# Patient Record
Sex: FEMALE | Race: WHITE | Employment: OTHER | URBAN - NONMETROPOLITAN AREA
[De-identification: names, ages, dates, MRNs, and addresses within clinical notes are randomized per-mention and may not be internally consistent; named-entity substitution may affect disease eponyms.]

---

## 2017-01-03 ENCOUNTER — OFFICE VISIT (OUTPATIENT)
Dept: FAMILY MEDICINE CLINIC | Age: 67
End: 2017-01-03

## 2017-01-03 VITALS
BODY MASS INDEX: 27.16 KG/M2 | WEIGHT: 183.4 LBS | HEIGHT: 69 IN | DIASTOLIC BLOOD PRESSURE: 70 MMHG | HEART RATE: 80 BPM | TEMPERATURE: 98.1 F | SYSTOLIC BLOOD PRESSURE: 132 MMHG | OXYGEN SATURATION: 98 %

## 2017-01-03 DIAGNOSIS — K21.9 GASTROESOPHAGEAL REFLUX DISEASE, ESOPHAGITIS PRESENCE NOT SPECIFIED: ICD-10-CM

## 2017-01-03 DIAGNOSIS — M65.30 TRIGGER FINGER, UNSPECIFIED FINGER, UNSPECIFIED LATERALITY: ICD-10-CM

## 2017-01-03 DIAGNOSIS — L30.9 ECZEMA, UNSPECIFIED TYPE: ICD-10-CM

## 2017-01-03 DIAGNOSIS — Z23 NEED FOR PNEUMOCOCCAL VACCINATION: ICD-10-CM

## 2017-01-03 DIAGNOSIS — E03.9 HYPOTHYROIDISM, UNSPECIFIED TYPE: ICD-10-CM

## 2017-01-03 DIAGNOSIS — Z00.00 WELCOME TO MEDICARE PREVENTIVE VISIT: Primary | ICD-10-CM

## 2017-01-03 DIAGNOSIS — E78.5 HYPERLIPIDEMIA, UNSPECIFIED HYPERLIPIDEMIA TYPE: ICD-10-CM

## 2017-01-03 PROCEDURE — G0402 INITIAL PREVENTIVE EXAM: HCPCS | Performed by: NURSE PRACTITIONER

## 2017-01-03 PROCEDURE — G0009 ADMIN PNEUMOCOCCAL VACCINE: HCPCS | Performed by: NURSE PRACTITIONER

## 2017-01-03 PROCEDURE — 90670 PCV13 VACCINE IM: CPT | Performed by: NURSE PRACTITIONER

## 2017-01-03 RX ORDER — MONTELUKAST SODIUM 10 MG/1
10 TABLET ORAL NIGHTLY
Qty: 90 TABLET | Refills: 1 | Status: SHIPPED | OUTPATIENT
Start: 2017-01-03 | End: 2017-01-11 | Stop reason: SDUPTHER

## 2017-01-03 RX ORDER — SIMVASTATIN 20 MG
20 TABLET ORAL NIGHTLY
Qty: 90 TABLET | Refills: 1 | Status: SHIPPED | OUTPATIENT
Start: 2017-01-03 | End: 2017-01-11 | Stop reason: SDUPTHER

## 2017-01-03 RX ORDER — DEXLANSOPRAZOLE 60 MG/1
60 CAPSULE, DELAYED RELEASE ORAL DAILY
Qty: 90 CAPSULE | Refills: 1 | Status: SHIPPED | OUTPATIENT
Start: 2017-01-03 | End: 2017-01-11 | Stop reason: SDUPTHER

## 2017-01-03 RX ORDER — AZELASTINE HCL 205.5 UG/1
1 SPRAY NASAL
COMMUNITY
Start: 2016-11-17 | End: 2017-07-18

## 2017-01-03 RX ORDER — CLOBETASOL PROPIONATE 0.5 MG/G
CREAM TOPICAL
Qty: 3 TUBE | Refills: 1 | Status: CANCELLED | OUTPATIENT
Start: 2017-01-03

## 2017-01-03 RX ORDER — LEVOTHYROXINE SODIUM 0.03 MG/1
25 TABLET ORAL DAILY
Qty: 90 TABLET | Refills: 1 | Status: SHIPPED | OUTPATIENT
Start: 2017-01-03 | End: 2017-01-11 | Stop reason: SDUPTHER

## 2017-01-03 RX ORDER — MAGNESIUM OXIDE 400 MG/1
400 TABLET ORAL DAILY
Qty: 30 TABLET | Refills: 6 | Status: CANCELLED | OUTPATIENT
Start: 2017-01-03

## 2017-01-03 ASSESSMENT — PATIENT HEALTH QUESTIONNAIRE - PHQ9: SUM OF ALL RESPONSES TO PHQ QUESTIONS 1-9: 0

## 2017-01-09 DIAGNOSIS — K21.9 GASTROESOPHAGEAL REFLUX DISEASE, ESOPHAGITIS PRESENCE NOT SPECIFIED: ICD-10-CM

## 2017-01-09 DIAGNOSIS — E78.5 HYPERLIPIDEMIA, UNSPECIFIED HYPERLIPIDEMIA TYPE: ICD-10-CM

## 2017-01-09 DIAGNOSIS — E03.9 HYPOTHYROIDISM, UNSPECIFIED TYPE: ICD-10-CM

## 2017-01-09 LAB
ALBUMIN SERPL-MCNC: 4.3 G/DL (ref 3.9–4.9)
ALP BLD-CCNC: 60 U/L (ref 40–130)
ALT SERPL-CCNC: 21 U/L (ref 0–33)
ANION GAP SERPL CALCULATED.3IONS-SCNC: 12 MEQ/L (ref 7–13)
AST SERPL-CCNC: 18 U/L (ref 0–35)
BASOPHILS ABSOLUTE: 0.1 K/UL (ref 0–0.2)
BASOPHILS RELATIVE PERCENT: 1.2 %
BILIRUB SERPL-MCNC: 0.4 MG/DL (ref 0–1.2)
BUN BLDV-MCNC: 17 MG/DL (ref 8–23)
CALCIUM SERPL-MCNC: 9.2 MG/DL (ref 8.6–10.2)
CHLORIDE BLD-SCNC: 100 MEQ/L (ref 98–107)
CHOLESTEROL, TOTAL: 184 MG/DL (ref 0–199)
CO2: 26 MEQ/L (ref 22–29)
CREAT SERPL-MCNC: 0.98 MG/DL (ref 0.5–0.9)
EOSINOPHILS ABSOLUTE: 0.4 K/UL (ref 0–0.7)
EOSINOPHILS RELATIVE PERCENT: 8.6 %
GFR AFRICAN AMERICAN: >60
GFR NON-AFRICAN AMERICAN: 56.9
GLOBULIN: 2.5 G/DL (ref 2.3–3.5)
GLUCOSE BLD-MCNC: 98 MG/DL (ref 74–109)
HCT VFR BLD CALC: 37.9 % (ref 37–47)
HDLC SERPL-MCNC: 76 MG/DL (ref 40–59)
HEMOGLOBIN: 12.9 G/DL (ref 12–16)
LDL CHOLESTEROL CALCULATED: 89 MG/DL (ref 0–129)
LYMPHOCYTES ABSOLUTE: 1.4 K/UL (ref 1–4.8)
LYMPHOCYTES RELATIVE PERCENT: 29.9 %
MCH RBC QN AUTO: 31.5 PG (ref 27–31.3)
MCHC RBC AUTO-ENTMCNC: 34.1 % (ref 33–37)
MCV RBC AUTO: 92.5 FL (ref 82–100)
MONOCYTES ABSOLUTE: 0.5 K/UL (ref 0.2–0.8)
MONOCYTES RELATIVE PERCENT: 10.4 %
NEUTROPHILS ABSOLUTE: 2.4 K/UL (ref 1.4–6.5)
NEUTROPHILS RELATIVE PERCENT: 49.9 %
PDW BLD-RTO: 13.1 % (ref 11.5–14.5)
PLATELET # BLD: 270 K/UL (ref 130–400)
POTASSIUM SERPL-SCNC: 4.4 MEQ/L (ref 3.5–5.1)
RBC # BLD: 4.1 M/UL (ref 4.2–5.4)
SODIUM BLD-SCNC: 138 MEQ/L (ref 132–144)
T4 FREE: 1.04 NG/DL (ref 0.93–1.7)
TOTAL PROTEIN: 6.8 G/DL (ref 6.4–8.1)
TRIGL SERPL-MCNC: 97 MG/DL (ref 0–200)
TSH SERPL DL<=0.05 MIU/L-ACNC: 4.45 UIU/ML (ref 0.27–4.2)
WBC # BLD: 4.8 K/UL (ref 4.8–10.8)

## 2017-01-11 DIAGNOSIS — K21.9 GASTROESOPHAGEAL REFLUX DISEASE, ESOPHAGITIS PRESENCE NOT SPECIFIED: ICD-10-CM

## 2017-01-11 DIAGNOSIS — E03.9 HYPOTHYROIDISM, UNSPECIFIED TYPE: ICD-10-CM

## 2017-01-11 DIAGNOSIS — E78.5 HYPERLIPIDEMIA, UNSPECIFIED HYPERLIPIDEMIA TYPE: ICD-10-CM

## 2017-01-11 RX ORDER — LEVOTHYROXINE SODIUM 0.03 MG/1
25 TABLET ORAL DAILY
Qty: 90 TABLET | Refills: 1 | Status: SHIPPED | OUTPATIENT
Start: 2017-01-11 | End: 2017-01-12 | Stop reason: CLARIF

## 2017-01-11 RX ORDER — DEXLANSOPRAZOLE 60 MG/1
60 CAPSULE, DELAYED RELEASE ORAL DAILY
Qty: 90 CAPSULE | Refills: 1 | Status: SHIPPED | OUTPATIENT
Start: 2017-01-11 | End: 2017-08-24 | Stop reason: SDUPTHER

## 2017-01-11 RX ORDER — MONTELUKAST SODIUM 10 MG/1
10 TABLET ORAL NIGHTLY
Qty: 90 TABLET | Refills: 1 | Status: SHIPPED | OUTPATIENT
Start: 2017-01-11 | End: 2017-08-24 | Stop reason: SDUPTHER

## 2017-01-11 RX ORDER — SIMVASTATIN 20 MG
20 TABLET ORAL NIGHTLY
Qty: 90 TABLET | Refills: 1 | Status: SHIPPED | OUTPATIENT
Start: 2017-01-11 | End: 2017-01-30

## 2017-01-17 ENCOUNTER — TELEPHONE (OUTPATIENT)
Dept: FAMILY MEDICINE CLINIC | Age: 67
End: 2017-01-17

## 2017-01-17 RX ORDER — LEVOTHYROXINE SODIUM 0.05 MG/1
50 TABLET ORAL DAILY
Qty: 90 TABLET | Refills: 1 | Status: SHIPPED | OUTPATIENT
Start: 2017-01-17 | End: 2017-06-05 | Stop reason: SDUPTHER

## 2017-01-30 RX ORDER — SIMVASTATIN 20 MG
20 TABLET ORAL EVERY EVENING
Qty: 90 TABLET | Refills: 0 | Status: SHIPPED | OUTPATIENT
Start: 2017-01-30 | End: 2018-11-28 | Stop reason: SDUPTHER

## 2017-04-25 ENCOUNTER — TELEPHONE (OUTPATIENT)
Dept: FAMILY MEDICINE CLINIC | Age: 67
End: 2017-04-25

## 2017-06-05 RX ORDER — LEVOTHYROXINE SODIUM 0.05 MG/1
50 TABLET ORAL DAILY
Qty: 90 TABLET | Refills: 1 | Status: SHIPPED | OUTPATIENT
Start: 2017-06-05 | End: 2018-01-03 | Stop reason: SDUPTHER

## 2017-06-05 RX ORDER — MOMETASONE FUROATE 50 UG/1
2 SPRAY, METERED NASAL 2 TIMES DAILY
Qty: 3 INHALER | Refills: 1 | Status: SHIPPED | OUTPATIENT
Start: 2017-06-05 | End: 2018-01-29 | Stop reason: CLARIF

## 2017-07-18 ENCOUNTER — OFFICE VISIT (OUTPATIENT)
Dept: FAMILY MEDICINE CLINIC | Age: 67
End: 2017-07-18

## 2017-07-18 VITALS
DIASTOLIC BLOOD PRESSURE: 70 MMHG | SYSTOLIC BLOOD PRESSURE: 132 MMHG | TEMPERATURE: 97.4 F | HEIGHT: 69 IN | HEART RATE: 75 BPM | BODY MASS INDEX: 27.18 KG/M2 | WEIGHT: 183.5 LBS | OXYGEN SATURATION: 98 %

## 2017-07-18 DIAGNOSIS — M25.551 RIGHT HIP PAIN: ICD-10-CM

## 2017-07-18 DIAGNOSIS — M54.41 ACUTE MIDLINE LOW BACK PAIN WITH RIGHT-SIDED SCIATICA: Primary | ICD-10-CM

## 2017-07-18 DIAGNOSIS — E78.5 HYPERLIPIDEMIA, UNSPECIFIED HYPERLIPIDEMIA TYPE: ICD-10-CM

## 2017-07-18 DIAGNOSIS — R73.03 PREDIABETES: ICD-10-CM

## 2017-07-18 DIAGNOSIS — E06.3 HASHIMOTO'S DISEASE: ICD-10-CM

## 2017-07-18 PROCEDURE — 3014F SCREEN MAMMO DOC REV: CPT | Performed by: NURSE PRACTITIONER

## 2017-07-18 PROCEDURE — 1036F TOBACCO NON-USER: CPT | Performed by: NURSE PRACTITIONER

## 2017-07-18 PROCEDURE — 4040F PNEUMOC VAC/ADMIN/RCVD: CPT | Performed by: NURSE PRACTITIONER

## 2017-07-18 PROCEDURE — G8427 DOCREV CUR MEDS BY ELIG CLIN: HCPCS | Performed by: NURSE PRACTITIONER

## 2017-07-18 PROCEDURE — 99214 OFFICE O/P EST MOD 30 MIN: CPT | Performed by: NURSE PRACTITIONER

## 2017-07-18 PROCEDURE — 1123F ACP DISCUSS/DSCN MKR DOCD: CPT | Performed by: NURSE PRACTITIONER

## 2017-07-18 PROCEDURE — 1090F PRES/ABSN URINE INCON ASSESS: CPT | Performed by: NURSE PRACTITIONER

## 2017-07-18 PROCEDURE — G8400 PT W/DXA NO RESULTS DOC: HCPCS | Performed by: NURSE PRACTITIONER

## 2017-07-18 PROCEDURE — G8419 CALC BMI OUT NRM PARAM NOF/U: HCPCS | Performed by: NURSE PRACTITIONER

## 2017-07-18 PROCEDURE — 3017F COLORECTAL CA SCREEN DOC REV: CPT | Performed by: NURSE PRACTITIONER

## 2017-07-18 RX ORDER — AZELASTINE HCL 205.5 UG/1
SPRAY NASAL
COMMUNITY
End: 2017-07-18

## 2017-07-18 ASSESSMENT — ENCOUNTER SYMPTOMS
COUGH: 0
BACK PAIN: 1
SHORTNESS OF BREATH: 0

## 2017-07-19 ENCOUNTER — TELEPHONE (OUTPATIENT)
Dept: FAMILY MEDICINE CLINIC | Age: 67
End: 2017-07-19

## 2017-07-21 DIAGNOSIS — E06.3 HASHIMOTO'S DISEASE: ICD-10-CM

## 2017-07-21 DIAGNOSIS — M54.41 ACUTE MIDLINE LOW BACK PAIN WITH RIGHT-SIDED SCIATICA: ICD-10-CM

## 2017-07-21 DIAGNOSIS — E78.5 HYPERLIPIDEMIA, UNSPECIFIED HYPERLIPIDEMIA TYPE: ICD-10-CM

## 2017-07-21 DIAGNOSIS — R73.03 PREDIABETES: ICD-10-CM

## 2017-07-21 LAB
ALBUMIN SERPL-MCNC: 4.1 G/DL (ref 3.9–4.9)
ALP BLD-CCNC: 52 U/L (ref 40–130)
ALT SERPL-CCNC: 18 U/L (ref 0–33)
ANION GAP SERPL CALCULATED.3IONS-SCNC: 10 MEQ/L (ref 7–13)
AST SERPL-CCNC: 19 U/L (ref 0–35)
BILIRUB SERPL-MCNC: 0.4 MG/DL (ref 0–1.2)
BUN BLDV-MCNC: 16 MG/DL (ref 8–23)
CALCIUM SERPL-MCNC: 9.4 MG/DL (ref 8.6–10.2)
CHLORIDE BLD-SCNC: 101 MEQ/L (ref 98–107)
CHOLESTEROL, TOTAL: 170 MG/DL (ref 0–199)
CO2: 27 MEQ/L (ref 22–29)
CREAT SERPL-MCNC: 0.72 MG/DL (ref 0.5–0.9)
GFR AFRICAN AMERICAN: >60
GFR NON-AFRICAN AMERICAN: >60
GLOBULIN: 2.6 G/DL (ref 2.3–3.5)
GLUCOSE BLD-MCNC: 92 MG/DL (ref 74–109)
HBA1C MFR BLD: 5.8 % (ref 4.8–5.9)
HDLC SERPL-MCNC: 75 MG/DL (ref 40–59)
LDL CHOLESTEROL CALCULATED: 77 MG/DL (ref 0–129)
POTASSIUM SERPL-SCNC: 4.6 MEQ/L (ref 3.5–5.1)
SODIUM BLD-SCNC: 138 MEQ/L (ref 132–144)
T4 FREE: 1.22 NG/DL (ref 0.93–1.7)
TOTAL PROTEIN: 6.7 G/DL (ref 6.4–8.1)
TRIGL SERPL-MCNC: 90 MG/DL (ref 0–200)
TSH SERPL DL<=0.05 MIU/L-ACNC: 2.26 UIU/ML (ref 0.27–4.2)

## 2017-07-24 DIAGNOSIS — M51.36 DDD (DEGENERATIVE DISC DISEASE), LUMBAR: Primary | ICD-10-CM

## 2017-08-09 ENCOUNTER — TELEPHONE (OUTPATIENT)
Dept: FAMILY MEDICINE CLINIC | Age: 67
End: 2017-08-09

## 2017-08-09 DIAGNOSIS — Z12.39 BREAST CANCER SCREENING: Primary | ICD-10-CM

## 2017-08-09 DIAGNOSIS — Z12.31 ENCOUNTER FOR SCREENING MAMMOGRAM FOR BREAST CANCER: ICD-10-CM

## 2017-08-10 PROBLEM — M47.26 OSTEOARTHRITIS OF SPINE WITH RADICULOPATHY, LUMBAR REGION: Status: ACTIVE | Noted: 2017-08-10

## 2017-08-11 PROBLEM — M47.817 LUMBOSACRAL SPONDYLOSIS WITHOUT MYELOPATHY: Status: ACTIVE | Noted: 2017-08-11

## 2017-08-24 DIAGNOSIS — K21.9 GASTROESOPHAGEAL REFLUX DISEASE, ESOPHAGITIS PRESENCE NOT SPECIFIED: ICD-10-CM

## 2017-08-24 RX ORDER — MONTELUKAST SODIUM 10 MG/1
TABLET ORAL
Qty: 90 TABLET | Refills: 1 | Status: SHIPPED | OUTPATIENT
Start: 2017-08-24 | End: 2018-01-03 | Stop reason: SDUPTHER

## 2017-08-24 RX ORDER — DEXLANSOPRAZOLE 60 MG/1
CAPSULE, DELAYED RELEASE ORAL
Qty: 90 CAPSULE | Refills: 1 | Status: SHIPPED | OUTPATIENT
Start: 2017-08-24 | End: 2018-01-03 | Stop reason: SDUPTHER

## 2017-11-03 ENCOUNTER — TELEPHONE (OUTPATIENT)
Dept: FAMILY MEDICINE CLINIC | Age: 67
End: 2017-11-03

## 2017-11-03 RX ORDER — CIPROFLOXACIN 500 MG/1
500 TABLET, FILM COATED ORAL 2 TIMES DAILY
Qty: 20 TABLET | Refills: 0 | Status: SHIPPED | OUTPATIENT
Start: 2017-11-03 | End: 2017-11-13

## 2017-11-03 NOTE — TELEPHONE ENCOUNTER
Orders Placed This Encounter   Medications    ciprofloxacin (CIPRO) 500 MG tablet     Sig: Take 1 tablet by mouth 2 times daily for 10 days     Dispense:  20 tablet     Refill:  0       The above med(s) were e-scripted to the patient's pharmacy.    Please advise patient  Bartolo Sanchez MD

## 2017-11-27 ENCOUNTER — HOSPITAL ENCOUNTER (OUTPATIENT)
Dept: WOMENS IMAGING | Age: 67
Discharge: HOME | End: 2017-11-27
Payer: MEDICARE

## 2017-11-27 DIAGNOSIS — Z12.31 ENCOUNTER FOR SCREENING MAMMOGRAM FOR BREAST CANCER: ICD-10-CM

## 2017-11-27 PROCEDURE — 77063 BREAST TOMOSYNTHESIS BI: CPT

## 2018-01-03 DIAGNOSIS — K21.9 GASTROESOPHAGEAL REFLUX DISEASE, ESOPHAGITIS PRESENCE NOT SPECIFIED: ICD-10-CM

## 2018-01-03 DIAGNOSIS — M54.5 LOW BACK PAIN, UNSPECIFIED BACK PAIN LATERALITY, UNSPECIFIED CHRONICITY, WITH SCIATICA PRESENCE UNSPECIFIED: ICD-10-CM

## 2018-01-03 DIAGNOSIS — M19.90 OSTEOARTHRITIS, UNSPECIFIED OSTEOARTHRITIS TYPE, UNSPECIFIED SITE: ICD-10-CM

## 2018-01-03 RX ORDER — LEVOTHYROXINE SODIUM 0.05 MG/1
50 TABLET ORAL DAILY
Qty: 90 TABLET | Refills: 1 | Status: SHIPPED | OUTPATIENT
Start: 2018-01-03 | End: 2018-01-04 | Stop reason: SDUPTHER

## 2018-01-03 RX ORDER — DEXLANSOPRAZOLE 60 MG/1
CAPSULE, DELAYED RELEASE ORAL
Qty: 90 CAPSULE | Refills: 1 | Status: SHIPPED | OUTPATIENT
Start: 2018-01-03 | End: 2018-01-04 | Stop reason: SDUPTHER

## 2018-01-03 RX ORDER — MONTELUKAST SODIUM 10 MG/1
TABLET ORAL
Qty: 90 TABLET | Refills: 1 | Status: SHIPPED | OUTPATIENT
Start: 2018-01-03 | End: 2018-01-04 | Stop reason: SDUPTHER

## 2018-01-03 RX ORDER — CYCLOBENZAPRINE HCL 10 MG
10 TABLET ORAL 2 TIMES DAILY PRN
Qty: 180 TABLET | Refills: 0 | Status: SHIPPED | OUTPATIENT
Start: 2018-01-03 | End: 2018-01-04 | Stop reason: SDUPTHER

## 2018-01-04 DIAGNOSIS — M54.5 LOW BACK PAIN, UNSPECIFIED BACK PAIN LATERALITY, UNSPECIFIED CHRONICITY, WITH SCIATICA PRESENCE UNSPECIFIED: ICD-10-CM

## 2018-01-04 DIAGNOSIS — K21.9 GASTROESOPHAGEAL REFLUX DISEASE, ESOPHAGITIS PRESENCE NOT SPECIFIED: ICD-10-CM

## 2018-01-04 DIAGNOSIS — M19.90 OSTEOARTHRITIS, UNSPECIFIED OSTEOARTHRITIS TYPE, UNSPECIFIED SITE: ICD-10-CM

## 2018-01-04 RX ORDER — LEVOTHYROXINE SODIUM 0.05 MG/1
50 TABLET ORAL DAILY
Qty: 90 TABLET | Refills: 1 | Status: SHIPPED | OUTPATIENT
Start: 2018-01-04 | End: 2018-06-11

## 2018-01-04 RX ORDER — MONTELUKAST SODIUM 10 MG/1
TABLET ORAL
Qty: 90 TABLET | Refills: 1 | Status: SHIPPED | OUTPATIENT
Start: 2018-01-04 | End: 2018-08-28 | Stop reason: SDUPTHER

## 2018-01-04 RX ORDER — DEXLANSOPRAZOLE 60 MG/1
CAPSULE, DELAYED RELEASE ORAL
Qty: 90 CAPSULE | Refills: 1 | Status: SHIPPED | OUTPATIENT
Start: 2018-01-04 | End: 2018-01-08 | Stop reason: SDUPTHER

## 2018-01-04 RX ORDER — CYCLOBENZAPRINE HCL 10 MG
10 TABLET ORAL 2 TIMES DAILY PRN
Qty: 180 TABLET | Refills: 0 | Status: SHIPPED | OUTPATIENT
Start: 2018-01-04 | End: 2019-01-10 | Stop reason: SDUPTHER

## 2018-01-08 ENCOUNTER — TELEPHONE (OUTPATIENT)
Dept: FAMILY MEDICINE CLINIC | Age: 68
End: 2018-01-08

## 2018-01-08 DIAGNOSIS — K21.9 GASTROESOPHAGEAL REFLUX DISEASE, ESOPHAGITIS PRESENCE NOT SPECIFIED: ICD-10-CM

## 2018-01-08 RX ORDER — DEXLANSOPRAZOLE 60 MG/1
CAPSULE, DELAYED RELEASE ORAL
Qty: 20 CAPSULE | Refills: 0 | COMMUNITY
Start: 2018-01-08 | End: 2018-08-28 | Stop reason: SDUPTHER

## 2018-01-10 ENCOUNTER — OFFICE VISIT (OUTPATIENT)
Dept: FAMILY MEDICINE CLINIC | Age: 68
End: 2018-01-10

## 2018-01-10 VITALS
HEIGHT: 69 IN | BODY MASS INDEX: 26.98 KG/M2 | HEART RATE: 79 BPM | SYSTOLIC BLOOD PRESSURE: 130 MMHG | DIASTOLIC BLOOD PRESSURE: 80 MMHG | TEMPERATURE: 97.9 F | WEIGHT: 182.2 LBS | OXYGEN SATURATION: 98 %

## 2018-01-10 DIAGNOSIS — R73.03 PREDIABETES: ICD-10-CM

## 2018-01-10 DIAGNOSIS — E78.5 HYPERLIPIDEMIA, UNSPECIFIED HYPERLIPIDEMIA TYPE: Primary | ICD-10-CM

## 2018-01-10 DIAGNOSIS — E03.9 HYPOTHYROIDISM, UNSPECIFIED TYPE: ICD-10-CM

## 2018-01-10 DIAGNOSIS — Z11.59 ENCOUNTER FOR HEPATITIS C SCREENING TEST FOR LOW RISK PATIENT: ICD-10-CM

## 2018-01-10 DIAGNOSIS — R41.3 MEMORY CHANGES: ICD-10-CM

## 2018-01-10 PROCEDURE — 99214 OFFICE O/P EST MOD 30 MIN: CPT | Performed by: NURSE PRACTITIONER

## 2018-01-10 ASSESSMENT — ENCOUNTER SYMPTOMS
COUGH: 0
SHORTNESS OF BREATH: 0

## 2018-01-10 NOTE — PROGRESS NOTES
Subjective  Chief Complaint   Patient presents with    Medication Check     pt here to discuss medications and doctor visit to different doctors for muscle pain       HPI     Pt here to fu on multiple concerns. 1) Fu on joint pain and mylagias. Saw [facility]SI. Recommended facet joint injections. Pt holding for now because she is tolerating symptoms well. 2) Would like labs rechecked. 3) Concerned about memory changes. Searching for words often. Misplacing things.  Mom had alzheimer's     Patient Active Problem List    Diagnosis Date Noted    Lumbosacral spondylosis without myelopathy 2017    Osteoarthritis of spine with radiculopathy, lumbar region 08/10/2017    Hashimoto's disease     Prediabetes     POTS (postural orthostatic tachycardia syndrome)     Trigger finger of right hand     Palpitations 2014    Hypothyroidism 2014    GERD (gastroesophageal reflux disease) 2014    Allergic rhinitis 2014    Asthma 2014    Osteoarthritis 2014    Lumbago 2014    Hyperlipidemia 2014     Past Medical History:   Diagnosis Date    Allergic rhinitis     Asthma     Dizziness     Dr. Doroteo Dias DJD (degenerative joint disease)     Eczema     GERD (gastroesophageal reflux disease)     Hashimoto's disease     History of chronic sinusitis     Hx of colonic polyps     BENIGN    Hx of thyroid nodule     biospy benign    Hyperlipidemia     Lumbago     Mild tricuspid regurgitation 2014    echo    Normal cardiac stress test 2014    POTS (postural orthostatic tachycardia syndrome)     Dr. Doroteo Dias Prediabetes     Trigger finger of right hand     4th and 5th digits     Past Surgical History:   Procedure Laterality Date    BREAST SURGERY  -LEFT    FIBROID TUMOR LUMPECTOMY     SECTION      COLONOSCOPY  ,,    GUM SURGERY      GUM IMPLANT    MOUTH SURGERY  -POLYP    NASAL POLYP SURGERY      KILLIAN-YULIYA POLYPS AND

## 2018-01-11 DIAGNOSIS — Z11.59 ENCOUNTER FOR HEPATITIS C SCREENING TEST FOR LOW RISK PATIENT: ICD-10-CM

## 2018-01-11 DIAGNOSIS — E03.9 HYPOTHYROIDISM, UNSPECIFIED TYPE: ICD-10-CM

## 2018-01-11 DIAGNOSIS — E78.5 HYPERLIPIDEMIA, UNSPECIFIED HYPERLIPIDEMIA TYPE: ICD-10-CM

## 2018-01-11 DIAGNOSIS — R73.03 PREDIABETES: ICD-10-CM

## 2018-01-11 LAB
ALBUMIN SERPL-MCNC: 4 G/DL (ref 3.9–4.9)
ALP BLD-CCNC: 58 U/L (ref 40–130)
ALT SERPL-CCNC: 27 U/L (ref 0–33)
ANION GAP SERPL CALCULATED.3IONS-SCNC: 13 MEQ/L (ref 7–13)
AST SERPL-CCNC: 20 U/L (ref 0–35)
BILIRUB SERPL-MCNC: 0.4 MG/DL (ref 0–1.2)
BUN BLDV-MCNC: 17 MG/DL (ref 8–23)
CALCIUM SERPL-MCNC: 9.2 MG/DL (ref 8.6–10.2)
CHLORIDE BLD-SCNC: 99 MEQ/L (ref 98–107)
CHOLESTEROL, TOTAL: 230 MG/DL (ref 0–199)
CO2: 28 MEQ/L (ref 22–29)
CREAT SERPL-MCNC: 0.85 MG/DL (ref 0.5–0.9)
GFR AFRICAN AMERICAN: >60
GFR NON-AFRICAN AMERICAN: >60
GLOBULIN: 2.4 G/DL (ref 2.3–3.5)
GLUCOSE BLD-MCNC: 78 MG/DL (ref 74–109)
HBA1C MFR BLD: 5.7 % (ref 4.8–5.9)
HDLC SERPL-MCNC: 72 MG/DL (ref 40–59)
HEPATITIS C ANTIBODY INTERPRETATION: NORMAL
LDL CHOLESTEROL CALCULATED: 140 MG/DL (ref 0–129)
POTASSIUM SERPL-SCNC: 4.4 MEQ/L (ref 3.5–5.1)
SODIUM BLD-SCNC: 140 MEQ/L (ref 132–144)
TOTAL PROTEIN: 6.4 G/DL (ref 6.4–8.1)
TRIGL SERPL-MCNC: 92 MG/DL (ref 0–200)
TSH SERPL DL<=0.05 MIU/L-ACNC: 2.41 UIU/ML (ref 0.27–4.2)

## 2018-01-17 ENCOUNTER — TELEPHONE (OUTPATIENT)
Dept: FAMILY MEDICINE CLINIC | Age: 68
End: 2018-01-17

## 2018-01-23 ENCOUNTER — TELEPHONE (OUTPATIENT)
Dept: FAMILY MEDICINE CLINIC | Age: 68
End: 2018-01-23

## 2018-01-29 ENCOUNTER — OFFICE VISIT (OUTPATIENT)
Dept: FAMILY MEDICINE CLINIC | Age: 68
End: 2018-01-29
Payer: MEDICARE

## 2018-01-29 VITALS
DIASTOLIC BLOOD PRESSURE: 62 MMHG | OXYGEN SATURATION: 97 % | WEIGHT: 181 LBS | BODY MASS INDEX: 26.81 KG/M2 | HEIGHT: 69 IN | SYSTOLIC BLOOD PRESSURE: 132 MMHG | HEART RATE: 88 BPM

## 2018-01-29 DIAGNOSIS — L30.9 ECZEMA, UNSPECIFIED TYPE: ICD-10-CM

## 2018-01-29 DIAGNOSIS — L82.1 SEBORRHEIC KERATOSES: Primary | ICD-10-CM

## 2018-01-29 PROCEDURE — 99213 OFFICE O/P EST LOW 20 MIN: CPT | Performed by: FAMILY MEDICINE

## 2018-01-29 PROCEDURE — 1123F ACP DISCUSS/DSCN MKR DOCD: CPT | Performed by: FAMILY MEDICINE

## 2018-01-29 PROCEDURE — G8484 FLU IMMUNIZE NO ADMIN: HCPCS | Performed by: FAMILY MEDICINE

## 2018-01-29 PROCEDURE — 1036F TOBACCO NON-USER: CPT | Performed by: FAMILY MEDICINE

## 2018-01-29 PROCEDURE — 1090F PRES/ABSN URINE INCON ASSESS: CPT | Performed by: FAMILY MEDICINE

## 2018-01-29 PROCEDURE — 3017F COLORECTAL CA SCREEN DOC REV: CPT | Performed by: FAMILY MEDICINE

## 2018-01-29 PROCEDURE — G8400 PT W/DXA NO RESULTS DOC: HCPCS | Performed by: FAMILY MEDICINE

## 2018-01-29 PROCEDURE — G8427 DOCREV CUR MEDS BY ELIG CLIN: HCPCS | Performed by: FAMILY MEDICINE

## 2018-01-29 PROCEDURE — G8419 CALC BMI OUT NRM PARAM NOF/U: HCPCS | Performed by: FAMILY MEDICINE

## 2018-01-29 PROCEDURE — 4040F PNEUMOC VAC/ADMIN/RCVD: CPT | Performed by: FAMILY MEDICINE

## 2018-01-29 PROCEDURE — 3014F SCREEN MAMMO DOC REV: CPT | Performed by: FAMILY MEDICINE

## 2018-01-29 RX ORDER — CLOBETASOL PROPIONATE 0.5 MG/G
CREAM TOPICAL
Qty: 60 TUBE | Refills: 1 | Status: SHIPPED | OUTPATIENT
Start: 2018-01-29 | End: 2022-01-06 | Stop reason: SDUPTHER

## 2018-01-29 ASSESSMENT — ENCOUNTER SYMPTOMS
ABDOMINAL PAIN: 0
SHORTNESS OF BREATH: 0

## 2018-01-29 ASSESSMENT — PATIENT HEALTH QUESTIONNAIRE - PHQ9
1. LITTLE INTEREST OR PLEASURE IN DOING THINGS: 0
2. FEELING DOWN, DEPRESSED OR HOPELESS: 0
SUM OF ALL RESPONSES TO PHQ QUESTIONS 1-9: 0
SUM OF ALL RESPONSES TO PHQ9 QUESTIONS 1 & 2: 0

## 2018-04-20 ENCOUNTER — HOSPITAL ENCOUNTER (OUTPATIENT)
Dept: NEUROLOGY | Age: 68
Discharge: HOME | End: 2018-04-20
Payer: MEDICARE

## 2018-04-20 PROCEDURE — 95816 EEG AWAKE AND DROWSY: CPT

## 2018-04-26 ENCOUNTER — HOSPITAL ENCOUNTER (OUTPATIENT)
Dept: MRI IMAGING | Age: 68
Discharge: HOME | End: 2018-04-28
Payer: MEDICARE

## 2018-04-26 DIAGNOSIS — I63.9 CEREBROVASCULAR ACCIDENT (CVA), UNSPECIFIED MECHANISM (HCC): ICD-10-CM

## 2018-04-26 PROCEDURE — 70551 MRI BRAIN STEM W/O DYE: CPT

## 2018-05-04 LAB — HOMOCYSTEINE: 10.5 UMOL/L (ref 0–15)

## 2018-05-05 LAB
FOLATE: 19.3 NG/ML (ref 7.3–26.1)
VITAMIN B-12: 250 PG/ML (ref 232–1245)

## 2018-06-11 RX ORDER — LEVOTHYROXINE SODIUM 0.05 MG/1
TABLET ORAL
Qty: 90 TABLET | Refills: 1 | Status: SHIPPED | OUTPATIENT
Start: 2018-06-11 | End: 2018-08-28 | Stop reason: SDUPTHER

## 2018-08-28 ENCOUNTER — OFFICE VISIT (OUTPATIENT)
Dept: FAMILY MEDICINE CLINIC | Age: 68
End: 2018-08-28
Payer: MEDICARE

## 2018-08-28 VITALS
DIASTOLIC BLOOD PRESSURE: 60 MMHG | BODY MASS INDEX: 26.66 KG/M2 | HEART RATE: 80 BPM | OXYGEN SATURATION: 97 % | SYSTOLIC BLOOD PRESSURE: 124 MMHG | TEMPERATURE: 98.1 F | WEIGHT: 180 LBS | HEIGHT: 69 IN

## 2018-08-28 DIAGNOSIS — J30.9 ALLERGIC RHINITIS, UNSPECIFIED SEASONALITY, UNSPECIFIED TRIGGER: ICD-10-CM

## 2018-08-28 DIAGNOSIS — K21.9 GASTROESOPHAGEAL REFLUX DISEASE, ESOPHAGITIS PRESENCE NOT SPECIFIED: ICD-10-CM

## 2018-08-28 DIAGNOSIS — E78.5 HYPERLIPIDEMIA, UNSPECIFIED HYPERLIPIDEMIA TYPE: ICD-10-CM

## 2018-08-28 DIAGNOSIS — D22.9 SUSPICIOUS NEVUS: ICD-10-CM

## 2018-08-28 DIAGNOSIS — R73.9 HYPERGLYCEMIA: ICD-10-CM

## 2018-08-28 DIAGNOSIS — E03.9 HYPOTHYROIDISM, UNSPECIFIED TYPE: Primary | ICD-10-CM

## 2018-08-28 DIAGNOSIS — R73.03 BORDERLINE DIABETES: ICD-10-CM

## 2018-08-28 PROCEDURE — 1036F TOBACCO NON-USER: CPT | Performed by: NURSE PRACTITIONER

## 2018-08-28 PROCEDURE — 99214 OFFICE O/P EST MOD 30 MIN: CPT | Performed by: NURSE PRACTITIONER

## 2018-08-28 PROCEDURE — 4040F PNEUMOC VAC/ADMIN/RCVD: CPT | Performed by: NURSE PRACTITIONER

## 2018-08-28 PROCEDURE — G8427 DOCREV CUR MEDS BY ELIG CLIN: HCPCS | Performed by: NURSE PRACTITIONER

## 2018-08-28 PROCEDURE — 1090F PRES/ABSN URINE INCON ASSESS: CPT | Performed by: NURSE PRACTITIONER

## 2018-08-28 PROCEDURE — 3017F COLORECTAL CA SCREEN DOC REV: CPT | Performed by: NURSE PRACTITIONER

## 2018-08-28 PROCEDURE — 1123F ACP DISCUSS/DSCN MKR DOCD: CPT | Performed by: NURSE PRACTITIONER

## 2018-08-28 PROCEDURE — G8400 PT W/DXA NO RESULTS DOC: HCPCS | Performed by: NURSE PRACTITIONER

## 2018-08-28 PROCEDURE — G8598 ASA/ANTIPLAT THER USED: HCPCS | Performed by: NURSE PRACTITIONER

## 2018-08-28 PROCEDURE — G8419 CALC BMI OUT NRM PARAM NOF/U: HCPCS | Performed by: NURSE PRACTITIONER

## 2018-08-28 PROCEDURE — 1101F PT FALLS ASSESS-DOCD LE1/YR: CPT | Performed by: NURSE PRACTITIONER

## 2018-08-28 RX ORDER — LEVOTHYROXINE SODIUM 0.05 MG/1
TABLET ORAL
Qty: 90 TABLET | Refills: 1 | Status: SHIPPED | OUTPATIENT
Start: 2018-08-28 | End: 2019-01-10 | Stop reason: SDUPTHER

## 2018-08-28 RX ORDER — DEXLANSOPRAZOLE 60 MG/1
CAPSULE, DELAYED RELEASE ORAL
Qty: 90 CAPSULE | Refills: 1 | Status: SHIPPED | OUTPATIENT
Start: 2018-08-28 | End: 2019-01-10 | Stop reason: SDUPTHER

## 2018-08-28 RX ORDER — FLUTICASONE PROPIONATE 50 MCG
2 SPRAY, SUSPENSION (ML) NASAL 2 TIMES DAILY
Qty: 3 BOTTLE | Refills: 1 | Status: SHIPPED | OUTPATIENT
Start: 2018-08-28 | End: 2018-09-05 | Stop reason: SDUPTHER

## 2018-08-28 RX ORDER — FLUTICASONE PROPIONATE 50 MCG
SPRAY, SUSPENSION (ML) NASAL
COMMUNITY
Start: 2018-06-11 | End: 2018-08-28 | Stop reason: SDUPTHER

## 2018-08-28 RX ORDER — MONTELUKAST SODIUM 10 MG/1
TABLET ORAL
Qty: 90 TABLET | Refills: 1 | Status: SHIPPED | OUTPATIENT
Start: 2018-08-28 | End: 2019-01-10 | Stop reason: SDUPTHER

## 2018-08-28 ASSESSMENT — ENCOUNTER SYMPTOMS
GASTROINTESTINAL NEGATIVE: 1
COLOR CHANGE: 1
RESPIRATORY NEGATIVE: 1

## 2018-08-28 NOTE — PROGRESS NOTES
Take 10 mg by mouth nightly.  ranitidine (ZANTAC) 150 MG tablet Take 150 mg by mouth as needed       calcium carbonate (OSCAL) 500 MG TABS tablet Take 500 mg by mouth daily. With D, K ZINC      simvastatin (ZOCOR) 20 MG tablet Take 1 tablet by mouth every evening 90 tablet 0     No current facility-administered medications on file prior to visit. Allergies   Allergen Reactions    Peanut (Diagnostic) Anaphylaxis     Asthma    Aspirin Other (See Comments)     Asthma  ASTHMA    Ceclor [Cefaclor] Hives    Pcn [Penicillins] Hives    Sulfa Antibiotics Hives       Review of Systems   Constitutional: Negative. HENT: Negative. Respiratory: Negative. Cardiovascular: Negative. Gastrointestinal: Negative. Musculoskeletal: Positive for arthralgias. Skin: Positive for color change. Psychiatric/Behavioral: The patient is nervous/anxious. Objective  Vitals:    08/28/18 1510   BP: 124/60   Pulse: 80   Temp: 98.1 °F (36.7 °C)   SpO2: 97%   Weight: 180 lb (81.6 kg)   Height: 5' 9\" (1.753 m)     Physical Exam   Constitutional: She is oriented to person, place, and time. She appears well-developed and well-nourished. HENT:   Head: Normocephalic. Right Ear: External ear normal.   Left Ear: External ear normal.   Eyes: Pupils are equal, round, and reactive to light. Conjunctivae are normal.   Neck: Neck supple. No thyromegaly present. Cardiovascular: Normal rate, regular rhythm, normal heart sounds and intact distal pulses. Pulmonary/Chest: Effort normal and breath sounds normal.   Lymphadenopathy:     She has no cervical adenopathy. Neurological: She is alert and oriented to person, place, and time. Skin: Skin is warm. Psychiatric: She has a normal mood and affect. Her behavior is normal. Thought content normal.         Assessment & Plan     Diagnosis Orders   1.  Hypothyroidism, unspecified type  levothyroxine (SYNTHROID) 50 MCG tablet    TSH without Reflex    Comprehensive

## 2018-09-05 DIAGNOSIS — K21.9 GASTROESOPHAGEAL REFLUX DISEASE, ESOPHAGITIS PRESENCE NOT SPECIFIED: ICD-10-CM

## 2018-09-05 DIAGNOSIS — E78.5 HYPERLIPIDEMIA, UNSPECIFIED HYPERLIPIDEMIA TYPE: ICD-10-CM

## 2018-09-05 DIAGNOSIS — R73.03 BORDERLINE DIABETES: ICD-10-CM

## 2018-09-05 DIAGNOSIS — R73.9 HYPERGLYCEMIA: ICD-10-CM

## 2018-09-05 DIAGNOSIS — J30.9 ALLERGIC RHINITIS, UNSPECIFIED SEASONALITY, UNSPECIFIED TRIGGER: ICD-10-CM

## 2018-09-05 DIAGNOSIS — E03.9 HYPOTHYROIDISM, UNSPECIFIED TYPE: ICD-10-CM

## 2018-09-05 LAB
ALBUMIN SERPL-MCNC: 4.5 G/DL (ref 3.9–4.9)
ALP BLD-CCNC: 60 U/L (ref 40–130)
ALT SERPL-CCNC: 32 U/L (ref 0–33)
ANION GAP SERPL CALCULATED.3IONS-SCNC: 12 MEQ/L (ref 7–13)
AST SERPL-CCNC: 22 U/L (ref 0–35)
BILIRUB SERPL-MCNC: 0.4 MG/DL (ref 0–1.2)
BUN BLDV-MCNC: 16 MG/DL (ref 8–23)
CALCIUM SERPL-MCNC: 9.7 MG/DL (ref 8.6–10.2)
CHLORIDE BLD-SCNC: 101 MEQ/L (ref 98–107)
CHOLESTEROL, TOTAL: 245 MG/DL (ref 0–199)
CO2: 26 MEQ/L (ref 22–29)
CREAT SERPL-MCNC: 0.88 MG/DL (ref 0.5–0.9)
GFR AFRICAN AMERICAN: >60
GFR NON-AFRICAN AMERICAN: >60
GLOBULIN: 2.6 G/DL (ref 2.3–3.5)
GLUCOSE BLD-MCNC: 84 MG/DL (ref 74–109)
HBA1C MFR BLD: 5.8 % (ref 4.8–5.9)
HDLC SERPL-MCNC: 69 MG/DL (ref 40–59)
LDL CHOLESTEROL CALCULATED: 158 MG/DL (ref 0–129)
POTASSIUM SERPL-SCNC: 4.9 MEQ/L (ref 3.5–5.1)
SODIUM BLD-SCNC: 139 MEQ/L (ref 132–144)
TOTAL PROTEIN: 7.1 G/DL (ref 6.4–8.1)
TRIGL SERPL-MCNC: 90 MG/DL (ref 0–200)
TSH SERPL DL<=0.05 MIU/L-ACNC: 3.28 UIU/ML (ref 0.27–4.2)

## 2018-09-05 RX ORDER — FLUTICASONE PROPIONATE 50 MCG
2 SPRAY, SUSPENSION (ML) NASAL DAILY
Qty: 3 BOTTLE | Refills: 1 | OUTPATIENT
Start: 2018-09-05 | End: 2019-01-10 | Stop reason: SDUPTHER

## 2018-09-17 ENCOUNTER — OFFICE VISIT (OUTPATIENT)
Dept: FAMILY MEDICINE CLINIC | Age: 68
End: 2018-09-17
Payer: MEDICARE

## 2018-09-17 VITALS
DIASTOLIC BLOOD PRESSURE: 80 MMHG | WEIGHT: 180 LBS | OXYGEN SATURATION: 98 % | HEIGHT: 69 IN | BODY MASS INDEX: 26.66 KG/M2 | HEART RATE: 88 BPM | TEMPERATURE: 97.8 F | SYSTOLIC BLOOD PRESSURE: 136 MMHG

## 2018-09-17 DIAGNOSIS — Z12.4 CERVICAL CANCER SCREENING: Primary | ICD-10-CM

## 2018-09-17 DIAGNOSIS — Z12.4 CERVICAL CANCER SCREENING: ICD-10-CM

## 2018-09-17 DIAGNOSIS — Z78.0 POST-MENOPAUSAL: ICD-10-CM

## 2018-09-17 DIAGNOSIS — Z12.39 BREAST CANCER SCREENING: ICD-10-CM

## 2018-09-17 PROCEDURE — G0101 CA SCREEN;PELVIC/BREAST EXAM: HCPCS | Performed by: NURSE PRACTITIONER

## 2018-09-17 ASSESSMENT — ENCOUNTER SYMPTOMS
CHEST TIGHTNESS: 0
ABDOMINAL DISTENTION: 0
COLOR CHANGE: 0
SHORTNESS OF BREATH: 0

## 2018-09-17 NOTE — PROGRESS NOTES
Subjective  No chief complaint on file. HPI     Pt here for a pap exam. Last one was   Remote hx of one abnormal.     Pt denies pelvic pain, odor, discharge or bleeding abnormalities. No other concerns.       Patient Active Problem List    Diagnosis Date Noted    Lumbosacral spondylosis without myelopathy 2017    Osteoarthritis of spine with radiculopathy, lumbar region 08/10/2017    Hashimoto's disease     Prediabetes     POTS (postural orthostatic tachycardia syndrome)     Trigger finger of right hand     Palpitations 2014    Hypothyroidism 2014    GERD (gastroesophageal reflux disease) 2014    Allergic rhinitis 2014    Asthma 2014    Osteoarthritis 2014    Lumbago 2014    Hyperlipidemia 2014     Past Medical History:   Diagnosis Date    Allergic rhinitis     Asthma     Dizziness     Dr. Freda Zapata DJD (degenerative joint disease)     Eczema     GERD (gastroesophageal reflux disease)     Hashimoto's disease     History of chronic sinusitis     Hx of colonic polyps     BENIGN    Hx of thyroid nodule     biospy benign    Hyperlipidemia     Lumbago     Mild tricuspid regurgitation 2014    echo    Normal cardiac stress test 2014    POTS (postural orthostatic tachycardia syndrome)     Dr. Freda Zapata Prediabetes     Trigger finger of right hand     4th and 5th digits     Past Surgical History:   Procedure Laterality Date    BREAST SURGERY  -LEFT    FIBROID TUMOR LUMPECTOMY     SECTION      COLONOSCOPY  ,,2010    GUM SURGERY      GUM IMPLANT    MOUTH SURGERY  2008-POLYP    NASAL POLYP SURGERY  1986    KILLIAN-YULIYA POLYPS AND WINDOWS, TURBINATES TRIMMED    NASAL POLYP SURGERY  FESS BILAT FISTULA REPAIR    SINUS SURGERY      TONSILLECTOMY AND ADENOIDECTOMY       Family History   Problem Relation Age of Onset    Cancer Other     Diabetes Other     Heart Disease Other     Diabetes Father    Nenita Craig Thyroid Disease Father     Thyroid Disease Sister     Diabetes Sister      Social History     Social History    Marital status:      Spouse name: N/A    Number of children: N/A    Years of education: N/A     Social History Main Topics    Smoking status: Former Smoker     Packs/day: 0.33     Years: 5.00     Quit date: 12/13/1976    Smokeless tobacco: Never Used    Alcohol use 0.0 oz/week      Comment: occasionally    Drug use: No    Sexual activity: Not Currently     Other Topics Concern    None     Social History Narrative    None     Current Outpatient Prescriptions on File Prior to Visit   Medication Sig Dispense Refill    fluticasone (FLONASE) 50 MCG/ACT nasal spray 2 sprays by Nasal route daily 3 Bottle 1    montelukast (SINGULAIR) 10 MG tablet TAKE 1 TABLET NIGHTLY 90 tablet 1    levothyroxine (SYNTHROID) 50 MCG tablet TAKE 1 TABLET DAILY 90 tablet 1    dexlansoprazole (DEXILANT) 60 MG CPDR delayed release capsule TAKE 1 CAPSULE DAILY 90 capsule 1    clobetasol (TEMOVATE) 0.05 % cream Apply topically 2 times daily prn Monday through Friday only, take weekends off 60 Tube 1    cyclobenzaprine (FLEXERIL) 10 MG tablet Take 1 tablet by mouth 2 times daily as needed for Muscle spasms 180 tablet 0    simvastatin (ZOCOR) 20 MG tablet Take 1 tablet by mouth every evening 90 tablet 0    albuterol sulfate (PROAIR RESPICLICK) 785 (90 BASE) MCG/ACT aerosol powder inhalation Inhale 2 puffs into the lungs every 4 hours as needed for Wheezing or Shortness of Breath 1 Inhaler 0    magnesium oxide (MAG-OX) 400 MG tablet Take 1 tablet by mouth daily. 30 tablet 6    aspirin 325 MG tablet Take 325 mg by mouth 2 times daily.  cetirizine (ZYRTEC ALLERGY) 10 MG tablet Take 10 mg by mouth nightly.  ranitidine (ZANTAC) 150 MG tablet Take 150 mg by mouth as needed       calcium carbonate (OSCAL) 500 MG TABS tablet Take 500 mg by mouth daily.  With D K ZINC       No current facility-administered medications on file prior to visit. Allergies   Allergen Reactions    Peanut (Diagnostic) Anaphylaxis     Asthma    Aspirin Other (See Comments)     Asthma  ASTHMA    Ceclor [Cefaclor] Hives    Pcn [Penicillins] Hives    Sulfa Antibiotics Hives       Review of Systems   Constitutional: Negative for activity change, appetite change, fatigue and fever. HENT: Negative. Respiratory: Negative for chest tightness and shortness of breath. Cardiovascular: Negative for chest pain, palpitations and leg swelling. Gastrointestinal: Negative for abdominal distention. Genitourinary: Negative for dysuria. Musculoskeletal: Positive for arthralgias. Skin: Negative for color change. Neurological: Negative for syncope, facial asymmetry and speech difficulty. Psychiatric/Behavioral: Negative. Objective  Vitals:    09/17/18 0830   BP: 136/80   Pulse: 88   Temp: 97.8 °F (36.6 °C)   SpO2: 98%   Weight: 180 lb (81.6 kg)   Height: 5' 9\" (1.753 m)     Physical Exam   Constitutional: She is oriented to person, place, and time. She appears well-developed and well-nourished. HENT:   Head: Normocephalic and atraumatic. Eyes: Pupils are equal, round, and reactive to light. Conjunctivae are normal.   Neck: Normal range of motion. Neck supple. Cardiovascular: Normal rate, regular rhythm, normal heart sounds and intact distal pulses. Pulmonary/Chest: Effort normal and breath sounds normal.   Abdominal: Soft. Bowel sounds are normal. Hernia confirmed negative in the right inguinal area and confirmed negative in the left inguinal area. Genitourinary: Rectal exam shows no external hemorrhoid, no internal hemorrhoid, no mass and no tenderness. Pelvic exam was performed with patient prone. No labial fusion. There is no rash, tenderness or lesion on the right labia. There is no rash, tenderness or lesion on the left labia. Uterus is not deviated, not enlarged, not fixed and not tender.  Cervix exhibits no motion tenderness, no discharge and no friability. Right adnexum displays no mass and no tenderness. Left adnexum displays no mass and no tenderness. No erythema, tenderness or bleeding in the vagina. No foreign body in the vagina. No signs of injury around the vagina. No vaginal discharge found. Musculoskeletal: Normal range of motion. Neurological: She is alert and oriented to person, place, and time. She has normal reflexes. Skin: Skin is warm and dry. Psychiatric: She has a normal mood and affect. Her behavior is normal. Judgment normal.     Assessment & Plan     Diagnosis Orders   1. Cervical cancer screening  PAP SMEAR   2. Breast cancer screening  CAMILO DIGITAL SCREEN W CAD BILATERAL   3. Post-menopausal  DEXA BONE DENSITY AXIAL SKELETON       Orders Placed This Encounter   Procedures    CAMILO DIGITAL SCREEN W CAD BILATERAL     Standing Status:   Future     Standing Expiration Date:   2019     Order Specific Question:   Reason for exam:     Answer:   screening    DEXA BONE DENSITY AXIAL SKELETON     Standing Status:   Future     Standing Expiration Date:   2019     Order Specific Question:   Reason for exam:     Answer:   post menopausal    PAP SMEAR     Patient History:    Patient's last menstrual period was 2001.   OBGYN Status: Postmenopausal  Past Surgical History:  -LEFT: BREAST SURGERY      Comment: FIBROID TUMOR LUMPECTOMY  No date:  SECTION  ,,2010: COLONOSCOPY  No date: GUM SURGERY      Comment: GUM IMPLANT  -POLYP: MOUTH SURGERY  1986: NASAL POLYP SURGERY      Comment: KILLIAN-YULIYA POLYPS AND WINDOWS, TURBINATES                TRIMMED  FESS BILAT FISTULA REPAIR: NASAL POLYP SURGERY  No date: SINUS SURGERY  : TONSILLECTOMY AND ADENOIDECTOMY      Smoking status: Former Smoker                                                              Packs/day: 0.33      Years: 5.00         Quit date: 1976  Smokeless tobacco: Never Used Standing Status:   Future     Standing Expiration Date:   9/17/2019     Order Specific Question:   Collection Type     Answer: Thin Prep     Order Specific Question:   Prior Abnormal Pap Test     Answer:   No     Order Specific Question:   Screening or Diagnostic     Answer:   Screening     Order Specific Question:   Additional STD Testing     Answer:   N/A     Order Specific Question:   HPV Requested? Answer:   HPV Co-Test     Order Specific Question:   High Risk Patient     Answer:   N/A     Discussed with pt to fu prn for any post-visit pain, bleeding, or discomfort. Discussed with pt to continue healthy habits and diet.     Return in about 1 year (around 9/17/2019), or if symptoms worsen or fail to improve, for annual.    Samantha Mcneill, APRN - CNP

## 2018-09-24 LAB
HPV COMMENT: NORMAL
HPV TYPE 16: NOT DETECTED
HPV TYPE 18: NOT DETECTED
HPVOH (OTHER TYPES): NOT DETECTED

## 2018-11-28 ENCOUNTER — HOSPITAL ENCOUNTER (OUTPATIENT)
Dept: WOMENS IMAGING | Age: 68
Discharge: HOME | End: 2018-11-30
Payer: MEDICARE

## 2018-11-28 DIAGNOSIS — Z12.39 BREAST CANCER SCREENING: ICD-10-CM

## 2018-11-28 DIAGNOSIS — Z78.0 POST-MENOPAUSAL: ICD-10-CM

## 2018-11-28 PROCEDURE — 77063 BREAST TOMOSYNTHESIS BI: CPT

## 2018-11-28 PROCEDURE — 77080 DXA BONE DENSITY AXIAL: CPT

## 2018-11-28 RX ORDER — SIMVASTATIN 20 MG
20 TABLET ORAL EVERY EVENING
Qty: 90 TABLET | Refills: 0 | Status: SHIPPED | OUTPATIENT
Start: 2018-11-28 | End: 2019-01-24 | Stop reason: SDUPTHER

## 2018-12-14 ENCOUNTER — NURSE ONLY (OUTPATIENT)
Dept: INTERNAL MEDICINE CLINIC | Age: 68
End: 2018-12-14

## 2018-12-14 DIAGNOSIS — Z23 NEED FOR SHINGLES VACCINE: Primary | ICD-10-CM

## 2019-01-10 ENCOUNTER — OFFICE VISIT (OUTPATIENT)
Dept: FAMILY MEDICINE CLINIC | Age: 69
End: 2019-01-10
Payer: MEDICARE

## 2019-01-10 VITALS
BODY MASS INDEX: 26.79 KG/M2 | DIASTOLIC BLOOD PRESSURE: 68 MMHG | HEART RATE: 86 BPM | OXYGEN SATURATION: 95 % | WEIGHT: 180.9 LBS | HEIGHT: 69 IN | SYSTOLIC BLOOD PRESSURE: 132 MMHG | TEMPERATURE: 97.3 F

## 2019-01-10 DIAGNOSIS — L30.9 ECZEMA, UNSPECIFIED TYPE: ICD-10-CM

## 2019-01-10 DIAGNOSIS — E78.5 HYPERLIPIDEMIA, UNSPECIFIED HYPERLIPIDEMIA TYPE: ICD-10-CM

## 2019-01-10 DIAGNOSIS — K21.9 GASTROESOPHAGEAL REFLUX DISEASE, ESOPHAGITIS PRESENCE NOT SPECIFIED: ICD-10-CM

## 2019-01-10 DIAGNOSIS — E03.9 HYPOTHYROIDISM, UNSPECIFIED TYPE: ICD-10-CM

## 2019-01-10 DIAGNOSIS — M54.5 LOW BACK PAIN, UNSPECIFIED BACK PAIN LATERALITY, UNSPECIFIED CHRONICITY, WITH SCIATICA PRESENCE UNSPECIFIED: ICD-10-CM

## 2019-01-10 DIAGNOSIS — E53.8 B12 DEFICIENCY: ICD-10-CM

## 2019-01-10 DIAGNOSIS — G47.00 INSOMNIA, UNSPECIFIED TYPE: Primary | ICD-10-CM

## 2019-01-10 DIAGNOSIS — J30.9 ALLERGIC RHINITIS, UNSPECIFIED SEASONALITY, UNSPECIFIED TRIGGER: ICD-10-CM

## 2019-01-10 DIAGNOSIS — M19.90 OSTEOARTHRITIS, UNSPECIFIED OSTEOARTHRITIS TYPE, UNSPECIFIED SITE: ICD-10-CM

## 2019-01-10 LAB
ALBUMIN SERPL-MCNC: 4.6 G/DL (ref 3.9–4.9)
ALP BLD-CCNC: 60 U/L (ref 40–130)
ALT SERPL-CCNC: 30 U/L (ref 0–33)
ANION GAP SERPL CALCULATED.3IONS-SCNC: 12 MEQ/L (ref 7–13)
AST SERPL-CCNC: 29 U/L (ref 0–35)
BILIRUB SERPL-MCNC: 0.6 MG/DL (ref 0–1.2)
BUN BLDV-MCNC: 20 MG/DL (ref 8–23)
CALCIUM SERPL-MCNC: 9.9 MG/DL (ref 8.6–10.2)
CHLORIDE BLD-SCNC: 99 MEQ/L (ref 98–107)
CHOLESTEROL, TOTAL: 193 MG/DL (ref 0–199)
CO2: 29 MEQ/L (ref 22–29)
CREAT SERPL-MCNC: 0.94 MG/DL (ref 0.5–0.9)
GFR AFRICAN AMERICAN: >60
GFR NON-AFRICAN AMERICAN: 59.4
GLOBULIN: 3.2 G/DL (ref 2.3–3.5)
GLUCOSE BLD-MCNC: 88 MG/DL (ref 74–109)
HDLC SERPL-MCNC: 84 MG/DL (ref 40–59)
LDL CHOLESTEROL CALCULATED: 89 MG/DL (ref 0–129)
POTASSIUM SERPL-SCNC: 4.7 MEQ/L (ref 3.5–5.1)
SODIUM BLD-SCNC: 140 MEQ/L (ref 132–144)
T3 TOTAL: 1.09 NG/ML (ref 0.8–2)
T4 FREE: 1.26 NG/DL (ref 0.93–1.7)
TOTAL PROTEIN: 7.8 G/DL (ref 6.4–8.1)
TRIGL SERPL-MCNC: 98 MG/DL (ref 0–200)
TSH SERPL DL<=0.05 MIU/L-ACNC: 2.86 UIU/ML (ref 0.27–4.2)
VITAMIN B-12: 1271 PG/ML (ref 232–1245)

## 2019-01-10 PROCEDURE — G8427 DOCREV CUR MEDS BY ELIG CLIN: HCPCS | Performed by: NURSE PRACTITIONER

## 2019-01-10 PROCEDURE — 1036F TOBACCO NON-USER: CPT | Performed by: NURSE PRACTITIONER

## 2019-01-10 PROCEDURE — 1101F PT FALLS ASSESS-DOCD LE1/YR: CPT | Performed by: NURSE PRACTITIONER

## 2019-01-10 PROCEDURE — G8399 PT W/DXA RESULTS DOCUMENT: HCPCS | Performed by: NURSE PRACTITIONER

## 2019-01-10 PROCEDURE — 1090F PRES/ABSN URINE INCON ASSESS: CPT | Performed by: NURSE PRACTITIONER

## 2019-01-10 PROCEDURE — G8419 CALC BMI OUT NRM PARAM NOF/U: HCPCS | Performed by: NURSE PRACTITIONER

## 2019-01-10 PROCEDURE — G8482 FLU IMMUNIZE ORDER/ADMIN: HCPCS | Performed by: NURSE PRACTITIONER

## 2019-01-10 PROCEDURE — 4040F PNEUMOC VAC/ADMIN/RCVD: CPT | Performed by: NURSE PRACTITIONER

## 2019-01-10 PROCEDURE — 1123F ACP DISCUSS/DSCN MKR DOCD: CPT | Performed by: NURSE PRACTITIONER

## 2019-01-10 PROCEDURE — 99214 OFFICE O/P EST MOD 30 MIN: CPT | Performed by: NURSE PRACTITIONER

## 2019-01-10 PROCEDURE — 3017F COLORECTAL CA SCREEN DOC REV: CPT | Performed by: NURSE PRACTITIONER

## 2019-01-10 RX ORDER — MONTELUKAST SODIUM 10 MG/1
TABLET ORAL
Qty: 90 TABLET | Refills: 1 | Status: SHIPPED | OUTPATIENT
Start: 2019-01-10 | End: 2019-01-24 | Stop reason: SDUPTHER

## 2019-01-10 RX ORDER — LEVOTHYROXINE SODIUM 0.05 MG/1
TABLET ORAL
Qty: 90 TABLET | Refills: 1 | Status: SHIPPED | OUTPATIENT
Start: 2019-01-10 | End: 2019-01-24 | Stop reason: SDUPTHER

## 2019-01-10 RX ORDER — TRAZODONE HYDROCHLORIDE 50 MG/1
50 TABLET ORAL NIGHTLY PRN
Qty: 30 TABLET | Refills: 0 | Status: SHIPPED | OUTPATIENT
Start: 2019-01-10 | End: 2019-06-24

## 2019-01-10 RX ORDER — CYCLOBENZAPRINE HCL 10 MG
10 TABLET ORAL 2 TIMES DAILY PRN
Qty: 90 TABLET | Refills: 0 | Status: SHIPPED | OUTPATIENT
Start: 2019-01-10 | End: 2019-01-28 | Stop reason: SDUPTHER

## 2019-01-10 RX ORDER — FLUTICASONE PROPIONATE 50 MCG
2 SPRAY, SUSPENSION (ML) NASAL DAILY
Qty: 3 BOTTLE | Refills: 1 | Status: SHIPPED | OUTPATIENT
Start: 2019-01-10 | End: 2019-01-24 | Stop reason: SDUPTHER

## 2019-01-10 RX ORDER — DEXLANSOPRAZOLE 60 MG/1
CAPSULE, DELAYED RELEASE ORAL
Qty: 90 CAPSULE | Refills: 1 | Status: SHIPPED | OUTPATIENT
Start: 2019-01-10 | End: 2019-06-24 | Stop reason: SDUPTHER

## 2019-01-10 ASSESSMENT — ENCOUNTER SYMPTOMS
SHORTNESS OF BREATH: 0
COUGH: 0
CONSTIPATION: 0
DIARRHEA: 0

## 2019-01-24 DIAGNOSIS — E03.9 HYPOTHYROIDISM, UNSPECIFIED TYPE: ICD-10-CM

## 2019-01-24 DIAGNOSIS — J30.9 ALLERGIC RHINITIS, UNSPECIFIED SEASONALITY, UNSPECIFIED TRIGGER: ICD-10-CM

## 2019-01-24 RX ORDER — FLUTICASONE PROPIONATE 50 MCG
2 SPRAY, SUSPENSION (ML) NASAL DAILY
Qty: 3 BOTTLE | Refills: 1 | Status: SHIPPED | OUTPATIENT
Start: 2019-01-24 | End: 2019-06-24 | Stop reason: SDUPTHER

## 2019-01-24 RX ORDER — MONTELUKAST SODIUM 10 MG/1
TABLET ORAL
Qty: 90 TABLET | Refills: 1 | Status: SHIPPED | OUTPATIENT
Start: 2019-01-24 | End: 2019-01-29 | Stop reason: SDUPTHER

## 2019-01-24 RX ORDER — SIMVASTATIN 20 MG
20 TABLET ORAL EVERY EVENING
Qty: 90 TABLET | Refills: 0 | Status: SHIPPED | OUTPATIENT
Start: 2019-01-24 | End: 2019-03-25

## 2019-01-24 RX ORDER — LEVOTHYROXINE SODIUM 0.05 MG/1
TABLET ORAL
Qty: 90 TABLET | Refills: 1 | Status: SHIPPED | OUTPATIENT
Start: 2019-01-24 | End: 2019-06-24 | Stop reason: SDUPTHER

## 2019-01-28 ENCOUNTER — TELEPHONE (OUTPATIENT)
Dept: FAMILY MEDICINE CLINIC | Age: 69
End: 2019-01-28

## 2019-01-28 DIAGNOSIS — M54.5 LOW BACK PAIN, UNSPECIFIED BACK PAIN LATERALITY, UNSPECIFIED CHRONICITY, WITH SCIATICA PRESENCE UNSPECIFIED: ICD-10-CM

## 2019-01-28 DIAGNOSIS — M19.90 OSTEOARTHRITIS, UNSPECIFIED OSTEOARTHRITIS TYPE, UNSPECIFIED SITE: ICD-10-CM

## 2019-01-28 RX ORDER — CYCLOBENZAPRINE HCL 10 MG
10 TABLET ORAL 2 TIMES DAILY PRN
Qty: 10 TABLET | Refills: 0 | Status: SHIPPED | OUTPATIENT
Start: 2019-01-28 | End: 2019-01-29 | Stop reason: CLARIF

## 2019-01-29 DIAGNOSIS — J30.9 ALLERGIC RHINITIS, UNSPECIFIED SEASONALITY, UNSPECIFIED TRIGGER: ICD-10-CM

## 2019-01-29 RX ORDER — CYCLOBENZAPRINE HCL 10 MG
10 TABLET ORAL 2 TIMES DAILY PRN
Qty: 90 TABLET | Refills: 0 | Status: SHIPPED | OUTPATIENT
Start: 2019-01-29 | End: 2020-01-02 | Stop reason: SDUPTHER

## 2019-01-29 RX ORDER — MONTELUKAST SODIUM 10 MG/1
TABLET ORAL
Qty: 90 TABLET | Refills: 1 | Status: SHIPPED | OUTPATIENT
Start: 2019-01-29 | End: 2019-03-25

## 2019-02-16 ENCOUNTER — PATIENT MESSAGE (OUTPATIENT)
Dept: FAMILY MEDICINE CLINIC | Age: 69
End: 2019-02-16

## 2019-02-18 ENCOUNTER — TELEPHONE (OUTPATIENT)
Dept: FAMILY MEDICINE CLINIC | Age: 69
End: 2019-02-18

## 2019-02-18 DIAGNOSIS — H91.90 HEARING LOSS, UNSPECIFIED HEARING LOSS TYPE, UNSPECIFIED LATERALITY: Primary | ICD-10-CM

## 2019-02-18 DIAGNOSIS — H93.19 TINNITUS, UNSPECIFIED LATERALITY: ICD-10-CM

## 2019-02-19 RX ORDER — METHYLPREDNISOLONE 4 MG/1
TABLET ORAL
Qty: 21 TABLET | Refills: 0 | Status: SHIPPED | OUTPATIENT
Start: 2019-02-19 | End: 2019-02-19 | Stop reason: SDUPTHER

## 2019-02-19 RX ORDER — METHYLPREDNISOLONE 4 MG/1
TABLET ORAL
Qty: 21 TABLET | Refills: 0 | Status: SHIPPED | OUTPATIENT
Start: 2019-02-19 | End: 2019-02-25

## 2019-02-20 ENCOUNTER — TELEPHONE (OUTPATIENT)
Dept: FAMILY MEDICINE CLINIC | Age: 69
End: 2019-02-20

## 2019-02-28 ENCOUNTER — TELEPHONE (OUTPATIENT)
Dept: FAMILY MEDICINE CLINIC | Age: 69
End: 2019-02-28

## 2019-03-25 DIAGNOSIS — J30.9 ALLERGIC RHINITIS, UNSPECIFIED SEASONALITY, UNSPECIFIED TRIGGER: ICD-10-CM

## 2019-03-25 RX ORDER — MONTELUKAST SODIUM 10 MG/1
TABLET ORAL
Qty: 90 TABLET | Refills: 1 | Status: SHIPPED | OUTPATIENT
Start: 2019-03-25 | End: 2019-06-24 | Stop reason: SDUPTHER

## 2019-03-25 RX ORDER — SIMVASTATIN 20 MG
TABLET ORAL
Qty: 90 TABLET | Refills: 0 | Status: SHIPPED | OUTPATIENT
Start: 2019-03-25 | End: 2019-06-12

## 2019-04-22 ENCOUNTER — OFFICE VISIT (OUTPATIENT)
Dept: FAMILY MEDICINE CLINIC | Age: 69
End: 2019-04-22
Payer: MEDICARE

## 2019-04-22 VITALS
OXYGEN SATURATION: 96 % | TEMPERATURE: 97.5 F | SYSTOLIC BLOOD PRESSURE: 130 MMHG | WEIGHT: 181.8 LBS | HEIGHT: 69 IN | BODY MASS INDEX: 26.93 KG/M2 | RESPIRATION RATE: 14 BRPM | DIASTOLIC BLOOD PRESSURE: 76 MMHG | HEART RATE: 98 BPM

## 2019-04-22 DIAGNOSIS — J40 SINOBRONCHITIS: Primary | ICD-10-CM

## 2019-04-22 DIAGNOSIS — J32.9 SINOBRONCHITIS: Primary | ICD-10-CM

## 2019-04-22 DIAGNOSIS — R06.2 EXPIRATORY WHEEZING ON RIGHT SIDE OF CHEST: ICD-10-CM

## 2019-04-22 PROCEDURE — G8427 DOCREV CUR MEDS BY ELIG CLIN: HCPCS | Performed by: NURSE PRACTITIONER

## 2019-04-22 PROCEDURE — 1123F ACP DISCUSS/DSCN MKR DOCD: CPT | Performed by: NURSE PRACTITIONER

## 2019-04-22 PROCEDURE — 3288F FALL RISK ASSESSMENT DOCD: CPT | Performed by: NURSE PRACTITIONER

## 2019-04-22 PROCEDURE — 3017F COLORECTAL CA SCREEN DOC REV: CPT | Performed by: NURSE PRACTITIONER

## 2019-04-22 PROCEDURE — 1036F TOBACCO NON-USER: CPT | Performed by: NURSE PRACTITIONER

## 2019-04-22 PROCEDURE — 4040F PNEUMOC VAC/ADMIN/RCVD: CPT | Performed by: NURSE PRACTITIONER

## 2019-04-22 PROCEDURE — G8419 CALC BMI OUT NRM PARAM NOF/U: HCPCS | Performed by: NURSE PRACTITIONER

## 2019-04-22 PROCEDURE — G8399 PT W/DXA RESULTS DOCUMENT: HCPCS | Performed by: NURSE PRACTITIONER

## 2019-04-22 PROCEDURE — 99213 OFFICE O/P EST LOW 20 MIN: CPT | Performed by: NURSE PRACTITIONER

## 2019-04-22 PROCEDURE — G8510 SCR DEP NEG, NO PLAN REQD: HCPCS | Performed by: NURSE PRACTITIONER

## 2019-04-22 PROCEDURE — 1090F PRES/ABSN URINE INCON ASSESS: CPT | Performed by: NURSE PRACTITIONER

## 2019-04-22 RX ORDER — AZELASTINE 1 MG/ML
2 SPRAY, METERED NASAL 2 TIMES DAILY
Qty: 2 BOTTLE | Refills: 0 | Status: SHIPPED | OUTPATIENT
Start: 2019-04-22

## 2019-04-22 RX ORDER — AZITHROMYCIN 250 MG/1
250 TABLET, FILM COATED ORAL DAILY
Qty: 6 TABLET | Refills: 0 | Status: SHIPPED | OUTPATIENT
Start: 2019-04-22 | End: 2019-04-27

## 2019-04-22 ASSESSMENT — ENCOUNTER SYMPTOMS
SHORTNESS OF BREATH: 0
RHINORRHEA: 1
SINUS PAIN: 1
SINUS PRESSURE: 1
COUGH: 1
CHEST TIGHTNESS: 0
WHEEZING: 0
SORE THROAT: 0

## 2019-04-22 ASSESSMENT — PATIENT HEALTH QUESTIONNAIRE - PHQ9
SUM OF ALL RESPONSES TO PHQ9 QUESTIONS 1 & 2: 2
1. LITTLE INTEREST OR PLEASURE IN DOING THINGS: 1
SUM OF ALL RESPONSES TO PHQ QUESTIONS 1-9: 2
SUM OF ALL RESPONSES TO PHQ QUESTIONS 1-9: 2
2. FEELING DOWN, DEPRESSED OR HOPELESS: 1

## 2019-04-22 NOTE — PROGRESS NOTES
Subjective  Hill Benson, 80 y.o. female presents today with:  Chief Complaint   Patient presents with    Congestion     x 10 days     Otalgia     right x 1 day        URI    This is a new problem. Episode onset: 10 days. The problem has been unchanged. Associated symptoms include congestion, coughing, ear pain (right), headaches, a plugged ear sensation, rhinorrhea and sinus pain. Pertinent negatives include no sore throat or wheezing. Treatments tried: 3 days supply azithromycin started last night. Review of Systems   Constitutional: Positive for fever (100.4 last night). Negative for chills and diaphoresis. HENT: Positive for congestion, dental problem (jaw pain), ear pain (right), postnasal drip, rhinorrhea, sinus pressure and sinus pain. Negative for sore throat. Respiratory: Positive for cough. Negative for chest tightness, shortness of breath and wheezing. Neurological: Positive for headaches. Objective    Vitals:    04/22/19 1448   BP: 130/76   Site: Right Upper Arm   Position: Sitting   Cuff Size: Medium Adult   Pulse: 98   Resp: 14   Temp: 97.5 °F (36.4 °C)   TempSrc: Temporal   SpO2: 96%   Weight: 181 lb 12.8 oz (82.5 kg)   Height: 5' 9\" (1.753 m)       Physical Exam   Constitutional: She appears well-developed and well-nourished. No distress. HENT:   Head: Normocephalic and atraumatic. Right Ear: External ear normal. Tympanic membrane is erythematous and bulging. Left Ear: External ear normal. Tympanic membrane is erythematous. Nose: Mucosal edema and rhinorrhea present. Right sinus exhibits no maxillary sinus tenderness and no frontal sinus tenderness. Left sinus exhibits no maxillary sinus tenderness and no frontal sinus tenderness. Mouth/Throat: Uvula is midline and oropharynx is clear and moist. No oropharyngeal exudate. Eyes: Pupils are equal, round, and reactive to light. Right eye exhibits no discharge. Left eye exhibits no discharge.    Neck: No tracheal PCP.    Side effects and adverse effects of any medication prescribed today, as well as treatment plan/rationale, follow-up care, and result expectations have been discussed with the patient. Expresses understanding and desires to proceed with treatment plan. Discussed signs and symptoms which require immediate follow-up in ED/call to 911. Understanding verbalized. I have reviewed and updated the electronic medical record.     Asmita Escalona, APRN - CNP

## 2019-06-12 RX ORDER — SIMVASTATIN 20 MG
TABLET ORAL
Qty: 90 TABLET | Refills: 0 | Status: SHIPPED | OUTPATIENT
Start: 2019-06-12 | End: 2019-06-24 | Stop reason: SDUPTHER

## 2019-06-24 ENCOUNTER — OFFICE VISIT (OUTPATIENT)
Dept: FAMILY MEDICINE CLINIC | Age: 69
End: 2019-06-24
Payer: MEDICARE

## 2019-06-24 VITALS
HEIGHT: 69 IN | HEART RATE: 79 BPM | DIASTOLIC BLOOD PRESSURE: 80 MMHG | BODY MASS INDEX: 26.6 KG/M2 | SYSTOLIC BLOOD PRESSURE: 122 MMHG | TEMPERATURE: 98.3 F | WEIGHT: 179.6 LBS | OXYGEN SATURATION: 98 %

## 2019-06-24 DIAGNOSIS — E78.5 HYPERLIPIDEMIA, UNSPECIFIED HYPERLIPIDEMIA TYPE: ICD-10-CM

## 2019-06-24 DIAGNOSIS — K21.9 GASTROESOPHAGEAL REFLUX DISEASE, ESOPHAGITIS PRESENCE NOT SPECIFIED: ICD-10-CM

## 2019-06-24 DIAGNOSIS — E03.9 HYPOTHYROIDISM, UNSPECIFIED TYPE: ICD-10-CM

## 2019-06-24 DIAGNOSIS — E03.9 HYPOTHYROIDISM, UNSPECIFIED TYPE: Primary | ICD-10-CM

## 2019-06-24 DIAGNOSIS — Z01.84 IMMUNITY STATUS TESTING: ICD-10-CM

## 2019-06-24 DIAGNOSIS — R73.03 BORDERLINE DIABETES: ICD-10-CM

## 2019-06-24 DIAGNOSIS — J30.9 ALLERGIC RHINITIS, UNSPECIFIED SEASONALITY, UNSPECIFIED TRIGGER: ICD-10-CM

## 2019-06-24 LAB
ALBUMIN SERPL-MCNC: 4.5 G/DL (ref 3.5–4.6)
ALP BLD-CCNC: 66 U/L (ref 40–130)
ALT SERPL-CCNC: 18 U/L (ref 0–33)
ANION GAP SERPL CALCULATED.3IONS-SCNC: 14 MEQ/L (ref 9–15)
AST SERPL-CCNC: 18 U/L (ref 0–35)
BILIRUB SERPL-MCNC: 0.4 MG/DL (ref 0.2–0.7)
BUN BLDV-MCNC: 16 MG/DL (ref 8–23)
CALCIUM SERPL-MCNC: 9.8 MG/DL (ref 8.5–9.9)
CHLORIDE BLD-SCNC: 102 MEQ/L (ref 95–107)
CO2: 28 MEQ/L (ref 20–31)
CREAT SERPL-MCNC: 0.74 MG/DL (ref 0.5–0.9)
GFR AFRICAN AMERICAN: >60
GFR NON-AFRICAN AMERICAN: >60
GLOBULIN: 2.7 G/DL (ref 2.3–3.5)
GLUCOSE BLD-MCNC: 87 MG/DL (ref 70–99)
HBA1C MFR BLD: 5.9 % (ref 4.8–5.9)
POTASSIUM SERPL-SCNC: 4.5 MEQ/L (ref 3.4–4.9)
RUBELLA ANTIBODY IGG: 130.1 IU/ML
SODIUM BLD-SCNC: 144 MEQ/L (ref 135–144)
TOTAL PROTEIN: 7.2 G/DL (ref 6.3–8)
TSH REFLEX: 2.11 UIU/ML (ref 0.44–3.86)

## 2019-06-24 PROCEDURE — G8419 CALC BMI OUT NRM PARAM NOF/U: HCPCS | Performed by: NURSE PRACTITIONER

## 2019-06-24 PROCEDURE — 4040F PNEUMOC VAC/ADMIN/RCVD: CPT | Performed by: NURSE PRACTITIONER

## 2019-06-24 PROCEDURE — 3017F COLORECTAL CA SCREEN DOC REV: CPT | Performed by: NURSE PRACTITIONER

## 2019-06-24 PROCEDURE — 1090F PRES/ABSN URINE INCON ASSESS: CPT | Performed by: NURSE PRACTITIONER

## 2019-06-24 PROCEDURE — 1036F TOBACCO NON-USER: CPT | Performed by: NURSE PRACTITIONER

## 2019-06-24 PROCEDURE — 1123F ACP DISCUSS/DSCN MKR DOCD: CPT | Performed by: NURSE PRACTITIONER

## 2019-06-24 PROCEDURE — G8399 PT W/DXA RESULTS DOCUMENT: HCPCS | Performed by: NURSE PRACTITIONER

## 2019-06-24 PROCEDURE — 99214 OFFICE O/P EST MOD 30 MIN: CPT | Performed by: NURSE PRACTITIONER

## 2019-06-24 PROCEDURE — G8427 DOCREV CUR MEDS BY ELIG CLIN: HCPCS | Performed by: NURSE PRACTITIONER

## 2019-06-24 RX ORDER — SIMVASTATIN 20 MG
TABLET ORAL
Qty: 90 TABLET | Refills: 1 | Status: SHIPPED | OUTPATIENT
Start: 2019-06-24 | End: 2020-01-02 | Stop reason: SDUPTHER

## 2019-06-24 RX ORDER — MONTELUKAST SODIUM 10 MG/1
TABLET ORAL
Qty: 90 TABLET | Refills: 1 | Status: SHIPPED | OUTPATIENT
Start: 2019-06-24 | End: 2020-01-02 | Stop reason: SDUPTHER

## 2019-06-24 RX ORDER — DEXLANSOPRAZOLE 60 MG/1
CAPSULE, DELAYED RELEASE ORAL
Qty: 90 CAPSULE | Refills: 1 | Status: SHIPPED | OUTPATIENT
Start: 2019-06-24 | End: 2020-01-02

## 2019-06-24 RX ORDER — FLUTICASONE PROPIONATE 50 MCG
2 SPRAY, SUSPENSION (ML) NASAL DAILY
Qty: 3 BOTTLE | Refills: 1 | Status: SHIPPED | OUTPATIENT
Start: 2019-06-24 | End: 2020-01-02 | Stop reason: SDUPTHER

## 2019-06-24 RX ORDER — LEVOTHYROXINE SODIUM 0.05 MG/1
TABLET ORAL
Qty: 90 TABLET | Refills: 1 | Status: SHIPPED | OUTPATIENT
Start: 2019-06-24 | End: 2020-01-02 | Stop reason: SDUPTHER

## 2019-06-24 ASSESSMENT — ENCOUNTER SYMPTOMS
DIARRHEA: 0
BACK PAIN: 1
CONSTIPATION: 0
SHORTNESS OF BREATH: 0
COUGH: 0

## 2019-06-24 NOTE — PROGRESS NOTES
Subjective  Chief Complaint   Patient presents with    Medication Refill    Hearing Problem     pt states she is losing hearing in her left ear     Immunizations     pt states she wants to discuss the titar vaccine?  Back Pain     pt states she has started to see somebody for her back issues        HPI     Pt is here for fu of long standing issues. Hearing loss in left ear. Saw specialists through in [facility]. Took some prednisone for a few weeks to try and mitigate fluid issues. May get PE tubes as well as hearing aides. Going to [facility] in the fall. Started seeing pain specialist at [facility] for her back and arthralgias. Takes tylenol PM at night to help sleep and deal with joint pain. Talking about injections.     Interested in counseling at some point in time for mental health      Patient Active Problem List    Diagnosis Date Noted    Lumbosacral spondylosis without myelopathy 08/11/2017    Osteoarthritis of spine with radiculopathy, lumbar region 08/10/2017    Hashimoto's disease     Prediabetes     POTS (postural orthostatic tachycardia syndrome)     Trigger finger of right hand     Palpitations 12/12/2014    Hypothyroidism 08/25/2014    GERD (gastroesophageal reflux disease) 08/25/2014    Allergic rhinitis 08/25/2014    Asthma 08/25/2014    Osteoarthritis 08/25/2014    Lumbago 08/25/2014    Hyperlipidemia 08/25/2014     Past Medical History:   Diagnosis Date    Allergic rhinitis     Asthma     Dizziness     Dr. Donato Sesay DJD (degenerative joint disease)     Eczema     GERD (gastroesophageal reflux disease)     Hashimoto's disease     History of chronic sinusitis     Hx of colonic polyps     BENIGN    Hx of thyroid nodule     biospy benign    Hyperlipidemia     Lumbago     Mild tricuspid regurgitation 12/08/2014    echo    Normal cardiac stress test 12/08/2014    POTS (postural orthostatic tachycardia syndrome)     Dr. Donato Sesay Prediabetes     Trigger finger of right hand     4th and 5th digits     Past Surgical History:   Procedure Laterality Date    BREAST SURGERY  -LEFT    FIBROID TUMOR LUMPECTOMY     SECTION      COLONOSCOPY  ,,2010    GUM SURGERY      GUM IMPLANT    MOUTH SURGERY  2008-POLYP    NASAL POLYP SURGERY      KILLIAN-YULIYA POLYPS AND WINDOWS, TURBINATES TRIMMED    NASAL POLYP SURGERY  FESS BILAT FISTULA REPAIR    SINUS SURGERY      TONSILLECTOMY AND ADENOIDECTOMY       Family History   Problem Relation Age of Onset    Cancer Other     Diabetes Other     Heart Disease Other     Diabetes Father     Thyroid Disease Father     Thyroid Disease Sister     Diabetes Sister      Social History     Socioeconomic History    Marital status:      Spouse name: Not on file    Number of children: Not on file    Years of education: Not on file    Highest education level: Not on file   Occupational History    Not on file   Social Needs    Financial resource strain: Not on file    Food insecurity:     Worry: Not on file     Inability: Not on file    Transportation needs:     Medical: Not on file     Non-medical: Not on file   Tobacco Use    Smoking status: Former Smoker     Packs/day: 0.33     Years: 5.00     Pack years: 1.65     Last attempt to quit: 1976     Years since quittin.5    Smokeless tobacco: Never Used   Substance and Sexual Activity    Alcohol use:  Yes     Alcohol/week: 0.0 oz     Comment: occasionally    Drug use: No    Sexual activity: Not Currently   Lifestyle    Physical activity:     Days per week: Not on file     Minutes per session: Not on file    Stress: Not on file   Relationships    Social connections:     Talks on phone: Not on file     Gets together: Not on file     Attends Sabianism service: Not on file     Active member of club or organization: Not on file     Attends meetings of clubs or organizations: Not on file     Relationship status: Not on file    Intimate partner violence:     Fear of current or ex partner: Not on file     Emotionally abused: Not on file     Physically abused: Not on file     Forced sexual activity: Not on file   Other Topics Concern    Not on file   Social History Narrative    Not on file     Current Outpatient Medications on File Prior to Visit   Medication Sig Dispense Refill    azelastine (ASTELIN) 0.1 % nasal spray 2 sprays by Nasal route 2 times daily 2 Spray in each nostril 2 Bottle 0    cyclobenzaprine (FLEXERIL) 10 MG tablet Take 1 tablet by mouth 2 times daily as needed for Muscle spasms 90 tablet 0    Methylcobalamin (B12-ACTIVE PO) Take 2,050 mcg by mouth      clobetasol (TEMOVATE) 0.05 % cream Apply topically 2 times daily prn Monday through Friday only, take weekends off (Patient taking differently: as needed Apply topically 2 times daily prn Monday through Friday only, take weekends off) 60 Tube 1    albuterol sulfate (PROAIR RESPICLICK) 199 (90 BASE) MCG/ACT aerosol powder inhalation Inhale 2 puffs into the lungs every 4 hours as needed for Wheezing or Shortness of Breath 1 Inhaler 0    magnesium oxide (MAG-OX) 400 MG tablet Take 1 tablet by mouth daily. 30 tablet 6    aspirin 325 MG tablet Take 325 mg by mouth 2 times daily.  cetirizine (ZYRTEC ALLERGY) 10 MG tablet Take 10 mg by mouth nightly.  ranitidine (ZANTAC) 150 MG tablet Take 150 mg by mouth as needed       calcium carbonate (OSCAL) 500 MG TABS tablet Take 500 mg by mouth daily. With D, K ZINC       No current facility-administered medications on file prior to visit. Allergies   Allergen Reactions    Peanut (Diagnostic) Anaphylaxis     Asthma    Aspirin Other (See Comments)     Asthma  ASTHMA    Ceclor [Cefaclor] Hives    Pcn [Penicillins] Hives    Sulfa Antibiotics Hives       Review of Systems   Constitutional: Negative for fatigue. HENT: Positive for hearing loss. Respiratory: Negative for cough and shortness of breath. Cardiovascular: Negative for chest pain.

## 2019-06-26 ENCOUNTER — TELEPHONE (OUTPATIENT)
Dept: FAMILY MEDICINE CLINIC | Age: 69
End: 2019-06-26

## 2019-06-26 LAB
MUV IGG SER QL: 98.6 AU/ML
RUBEOLA (MEASLES) AB IGG: 147 AU/ML

## 2019-06-26 NOTE — TELEPHONE ENCOUNTER
Called Dr. Arie Smalls office to make them aware of this issue. Placed a Safe Care Report for this error. I also contacted the help desk to get this issue resolved. Ticket number [id number]. Will update once I hear back for the help desk.

## 2019-11-01 ENCOUNTER — TELEPHONE (OUTPATIENT)
Dept: FAMILY MEDICINE CLINIC | Age: 69
End: 2019-11-01

## 2019-11-05 ENCOUNTER — TELEPHONE (OUTPATIENT)
Dept: FAMILY MEDICINE CLINIC | Age: 69
End: 2019-11-05

## 2019-11-20 NOTE — TELEPHONE ENCOUNTER
Pt calling back states that a form should be faxed here. We need to make sure its says Drug Exception Form.  If it doesn't have that call pt and she will call them back

## 2019-11-21 NOTE — TELEPHONE ENCOUNTER
On 11/12/19 [employer] sent more documents reg a PA on this. See attached     Still waiting on correct form!

## 2019-12-12 NOTE — TELEPHONE ENCOUNTER
Spoke to pt, med has been dropped to a tier 3 which help with cost. I am going to attempt a letter with reports pt is having sent showing evidence of still having issues even with dexilant!

## 2020-01-02 ENCOUNTER — OFFICE VISIT (OUTPATIENT)
Dept: FAMILY MEDICINE CLINIC | Age: 70
End: 2020-01-02
Payer: MEDICARE

## 2020-01-02 VITALS
RESPIRATION RATE: 16 BRPM | OXYGEN SATURATION: 97 % | DIASTOLIC BLOOD PRESSURE: 70 MMHG | BODY MASS INDEX: 26.9 KG/M2 | SYSTOLIC BLOOD PRESSURE: 120 MMHG | HEART RATE: 102 BPM | WEIGHT: 181.6 LBS | TEMPERATURE: 97.8 F | HEIGHT: 69 IN

## 2020-01-02 DIAGNOSIS — R53.83 FATIGUE, UNSPECIFIED TYPE: ICD-10-CM

## 2020-01-02 DIAGNOSIS — D64.9 ANEMIA, UNSPECIFIED TYPE: ICD-10-CM

## 2020-01-02 LAB
ALBUMIN SERPL-MCNC: 4.7 G/DL (ref 3.5–4.6)
ALP BLD-CCNC: 65 U/L (ref 40–130)
ALT SERPL-CCNC: 23 U/L (ref 0–33)
ANION GAP SERPL CALCULATED.3IONS-SCNC: 15 MEQ/L (ref 9–15)
AST SERPL-CCNC: 20 U/L (ref 0–35)
BASOPHILS ABSOLUTE: 0.1 K/UL (ref 0–0.2)
BASOPHILS RELATIVE PERCENT: 2.1 %
BILIRUB SERPL-MCNC: 0.4 MG/DL (ref 0.2–0.7)
BUN BLDV-MCNC: 20 MG/DL (ref 8–23)
CALCIUM SERPL-MCNC: 9.8 MG/DL (ref 8.5–9.9)
CHLORIDE BLD-SCNC: 102 MEQ/L (ref 95–107)
CO2: 25 MEQ/L (ref 20–31)
CREAT SERPL-MCNC: 0.7 MG/DL (ref 0.5–0.9)
EOSINOPHILS ABSOLUTE: 0.3 K/UL (ref 0–0.7)
EOSINOPHILS RELATIVE PERCENT: 5.5 %
GFR AFRICAN AMERICAN: >60
GFR NON-AFRICAN AMERICAN: >60
GLOBULIN: 3.2 G/DL (ref 2.3–3.5)
GLUCOSE BLD-MCNC: 62 MG/DL (ref 70–99)
HCT VFR BLD CALC: 40.4 % (ref 37–47)
HEMOGLOBIN: 13.5 G/DL (ref 12–16)
LYMPHOCYTES ABSOLUTE: 1.1 K/UL (ref 1–4.8)
LYMPHOCYTES RELATIVE PERCENT: 22.6 %
MCH RBC QN AUTO: 31.1 PG (ref 27–31.3)
MCHC RBC AUTO-ENTMCNC: 33.5 % (ref 33–37)
MCV RBC AUTO: 92.8 FL (ref 82–100)
MONOCYTES ABSOLUTE: 0.6 K/UL (ref 0.2–0.8)
MONOCYTES RELATIVE PERCENT: 13.2 %
NEUTROPHILS ABSOLUTE: 2.7 K/UL (ref 1.4–6.5)
NEUTROPHILS RELATIVE PERCENT: 56.6 %
PDW BLD-RTO: 13.6 % (ref 11.5–14.5)
PLATELET # BLD: 344 K/UL (ref 130–400)
POTASSIUM SERPL-SCNC: 4.3 MEQ/L (ref 3.4–4.9)
RBC # BLD: 4.35 M/UL (ref 4.2–5.4)
SODIUM BLD-SCNC: 142 MEQ/L (ref 135–144)
TOTAL PROTEIN: 7.9 G/DL (ref 6.3–8)
TSH REFLEX: 4 UIU/ML (ref 0.44–3.86)
VITAMIN B-12: 466 PG/ML (ref 232–1245)
WBC # BLD: 4.8 K/UL (ref 4.8–10.8)

## 2020-01-02 PROCEDURE — G0438 PPPS, INITIAL VISIT: HCPCS | Performed by: NURSE PRACTITIONER

## 2020-01-02 PROCEDURE — 1123F ACP DISCUSS/DSCN MKR DOCD: CPT | Performed by: NURSE PRACTITIONER

## 2020-01-02 PROCEDURE — 99213 OFFICE O/P EST LOW 20 MIN: CPT | Performed by: NURSE PRACTITIONER

## 2020-01-02 PROCEDURE — 3017F COLORECTAL CA SCREEN DOC REV: CPT | Performed by: NURSE PRACTITIONER

## 2020-01-02 PROCEDURE — G8484 FLU IMMUNIZE NO ADMIN: HCPCS | Performed by: NURSE PRACTITIONER

## 2020-01-02 PROCEDURE — 4040F PNEUMOC VAC/ADMIN/RCVD: CPT | Performed by: NURSE PRACTITIONER

## 2020-01-02 RX ORDER — CIPROFLOXACIN 500 MG/1
500 TABLET, FILM COATED ORAL 2 TIMES DAILY
Qty: 14 TABLET | Refills: 0 | Status: SHIPPED | OUTPATIENT
Start: 2020-01-02 | End: 2020-01-09

## 2020-01-02 RX ORDER — MOMETASONE FUROATE 50 UG/1
SPRAY, METERED NASAL
COMMUNITY
Start: 2013-11-19 | End: 2020-06-18

## 2020-01-02 RX ORDER — SIMVASTATIN 20 MG
TABLET ORAL
Qty: 90 TABLET | Refills: 1 | Status: SHIPPED | OUTPATIENT
Start: 2020-01-02 | End: 2020-06-18 | Stop reason: SDUPTHER

## 2020-01-02 RX ORDER — FLUTICASONE PROPIONATE 50 MCG
2 SPRAY, SUSPENSION (ML) NASAL DAILY
Qty: 3 BOTTLE | Refills: 1 | Status: SHIPPED | OUTPATIENT
Start: 2020-01-02 | End: 2020-06-18 | Stop reason: SDUPTHER

## 2020-01-02 RX ORDER — MONTELUKAST SODIUM 10 MG/1
TABLET ORAL
Qty: 90 TABLET | Refills: 1 | Status: SHIPPED | OUTPATIENT
Start: 2020-01-02 | End: 2020-06-18 | Stop reason: SDUPTHER

## 2020-01-02 RX ORDER — SODIUM, POTASSIUM,MAG SULFATES 17.5-3.13G
SOLUTION, RECONSTITUTED, ORAL ORAL
COMMUNITY
Start: 2019-11-19 | End: 2020-06-18

## 2020-01-02 RX ORDER — CYCLOBENZAPRINE HCL 10 MG
10 TABLET ORAL 2 TIMES DAILY PRN
Qty: 90 TABLET | Refills: 0 | Status: SHIPPED | OUTPATIENT
Start: 2020-01-02 | End: 2020-06-18 | Stop reason: SDUPTHER

## 2020-01-02 RX ORDER — LEVOTHYROXINE SODIUM 0.05 MG/1
TABLET ORAL
Qty: 90 TABLET | Refills: 1 | Status: SHIPPED | OUTPATIENT
Start: 2020-01-02 | End: 2020-03-23 | Stop reason: SDUPTHER

## 2020-01-02 RX ORDER — MOMETASONE FUROATE 50 UG/1
SPRAY, METERED NASAL
Qty: 1 INHALER | Status: CANCELLED | OUTPATIENT
Start: 2020-01-02

## 2020-01-02 RX ORDER — PREDNISONE 20 MG/1
20 TABLET ORAL 2 TIMES DAILY
Qty: 20 TABLET | Refills: 0 | Status: SHIPPED | OUTPATIENT
Start: 2020-01-02 | End: 2020-01-12

## 2020-01-02 RX ORDER — DEXLANSOPRAZOLE 60 MG/1
60 CAPSULE, DELAYED RELEASE ORAL DAILY
Qty: 90 CAPSULE | Refills: 1 | Status: SHIPPED | OUTPATIENT
Start: 2020-01-02 | End: 2020-06-18 | Stop reason: SDUPTHER

## 2020-01-02 SDOH — ECONOMIC STABILITY: TRANSPORTATION INSECURITY
IN THE PAST 12 MONTHS, HAS LACK OF TRANSPORTATION KEPT YOU FROM MEETINGS, WORK, OR FROM GETTING THINGS NEEDED FOR DAILY LIVING?: NO

## 2020-01-02 SDOH — ECONOMIC STABILITY: FOOD INSECURITY: WITHIN THE PAST 12 MONTHS, THE FOOD YOU BOUGHT JUST DIDN'T LAST AND YOU DIDN'T HAVE MONEY TO GET MORE.: NEVER TRUE

## 2020-01-02 SDOH — ECONOMIC STABILITY: TRANSPORTATION INSECURITY
IN THE PAST 12 MONTHS, HAS THE LACK OF TRANSPORTATION KEPT YOU FROM MEDICAL APPOINTMENTS OR FROM GETTING MEDICATIONS?: NO

## 2020-01-02 SDOH — ECONOMIC STABILITY: FOOD INSECURITY: WITHIN THE PAST 12 MONTHS, YOU WORRIED THAT YOUR FOOD WOULD RUN OUT BEFORE YOU GOT MONEY TO BUY MORE.: NEVER TRUE

## 2020-01-02 ASSESSMENT — PATIENT HEALTH QUESTIONNAIRE - PHQ9
SUM OF ALL RESPONSES TO PHQ9 QUESTIONS 1 & 2: 0
1. LITTLE INTEREST OR PLEASURE IN DOING THINGS: 0
SUM OF ALL RESPONSES TO PHQ QUESTIONS 1-9: 0
2. FEELING DOWN, DEPRESSED OR HOPELESS: 0
SUM OF ALL RESPONSES TO PHQ QUESTIONS 1-9: 0

## 2020-01-02 NOTE — PATIENT INSTRUCTIONS
Personalized Preventive Plan for Jo-Ann Banerjee - 1/2/2020  Medicare offers a range of preventive health benefits. Some of the tests and screenings are paid in full while other may be subject to a deductible, co-insurance, and/or copay. Some of these benefits include a comprehensive review of your medical history including lifestyle, illnesses that may run in your family, and various assessments and screenings as appropriate. After reviewing your medical record and screening and assessments performed today your provider may have ordered immunizations, labs, imaging, and/or referrals for you. A list of these orders (if applicable) as well as your Preventive Care list are included within your After Visit Summary for your review. Other Preventive Recommendations:    · A preventive eye exam performed by an eye specialist is recommended every 1-2 years to screen for glaucoma; cataracts, macular degeneration, and other eye disorders. · A preventive dental visit is recommended every 6 months. · Try to get at least 150 minutes of exercise per week or 10,000 steps per day on a pedometer . · Order or download the FREE \"Exercise & Physical Activity: Your Everyday Guide\" from [employer]. Call 3-439.739.2488 or search [employer] online. · You need 7298-4231 mg of calcium and 7989-5245 IU of vitamin D per day. It is possible to meet your calcium requirement with diet alone, but a vitamin D supplement is usually necessary to meet this goal.  · When exposed to the sun, use a sunscreen that protects against both UVA and UVB radiation with an SPF of 30 or greater. Reapply every 2 to 3 hours or after sweating, drying off with a towel, or swimming. · Always wear a seat belt when traveling in a car. Always wear a helmet when riding a bicycle or motorcycle.

## 2020-01-02 NOTE — PROGRESS NOTES
release capsule Take 1 capsule by mouth daily Yes JENN Murray CNP   ciprofloxacin (CIPRO) 500 MG tablet Take 1 tablet by mouth 2 times daily for 7 days Yes JENN Murray CNP   azelastine (ASTELIN) 0.1 % nasal spray 2 sprays by Nasal route 2 times daily 2 Spray in each nostril Yes JENN Lobato CNP   cyclobenzaprine (FLEXERIL) 10 MG tablet Take 1 tablet by mouth 2 times daily as needed for Muscle spasms Yes JENN Murray CNP   Methylcobalamin (B12-ACTIVE PO) Take 2,050 mcg by mouth Yes Historical Provider, MD   clobetasol (TEMOVATE) 0.05 % cream Apply topically 2 times daily prn Monday through Friday only, take weekends off  Patient taking differently: as needed Apply topically 2 times daily prn Monday through Friday only, take weekends off Yes Shari Hernandez MD   albuterol sulfate (PROAIR RESPICLICK) 983 (90 BASE) MCG/ACT aerosol powder inhalation Inhale 2 puffs into the lungs every 4 hours as needed for Wheezing or Shortness of Breath Yes JENN Murray CNP   magnesium oxide (MAG-OX) 400 MG tablet Take 1 tablet by mouth daily. Yes Everton J Holiday, DO   aspirin 325 MG tablet Take 325 mg by mouth 2 times daily. Yes Historical Provider, MD   cetirizine (ZYRTEC ALLERGY) 10 MG tablet Take 10 mg by mouth nightly. Yes Historical Provider, MD   ranitidine (ZANTAC) 150 MG tablet Take 150 mg by mouth as needed  Yes Historical Provider, MD   calcium carbonate (OSCAL) 500 MG TABS tablet Take 500 mg by mouth daily.  With D, K ZINC Yes Historical Provider, MD         Past Medical History:   Diagnosis Date    Allergic rhinitis     Asthma     Dizziness     Dr. Supa HAGER (degenerative joint disease)     Eczema     GERD (gastroesophageal reflux disease)     Hashimoto's disease     History of chronic sinusitis     Hx of colonic polyps     BENIGN    Hx of thyroid nodule     biospy benign    Hyperlipidemia     Lumbago     Mild tricuspid regurgitation 12/08/2014    echo    vaccine (3 - Td) 12/13/2020    DEXA (modify frequency per FRAX score)  Completed    Flu vaccine  Completed    Shingles Vaccine  Completed    Hepatitis C screen  Completed     Recommendations for Preventive Services Due: see orders and patient instructions/AVS.  . Recommended screening schedule for the next 5-10 years is provided to the patient in written form: see Patient Yovany Sanchez was seen today for medicare aw. Diagnoses and all orders for this visit:    Anemia, unspecified type  -     Vitamin B12; Future  -     CBC Auto Differential; Future    Allergic rhinitis, unspecified seasonality, unspecified trigger  -     fluticasone (FLONASE) 50 MCG/ACT nasal spray; 2 sprays by Nasal route daily  -     montelukast (SINGULAIR) 10 MG tablet; TAKE 1 TABLET AT NIGHT    Hypothyroidism, unspecified type  -     levothyroxine (SYNTHROID) 50 MCG tablet; TAKE 1 TABLET DAILY    Hyperlipidemia, unspecified hyperlipidemia type  -     simvastatin (ZOCOR) 20 MG tablet; TAKE 1 TABLET EVERY EVENING    Osteoarthritis, unspecified osteoarthritis type, unspecified site  -     predniSONE (DELTASONE) 20 MG tablet; Take 1 tablet by mouth 2 times daily for 10 days    Low back pain  -     predniSONE (DELTASONE) 20 MG tablet; Take 1 tablet by mouth 2 times daily for 10 days    Fatigue, unspecified type  -     Comprehensive Metabolic Panel; Future  -     TSH with Reflex; Future    Routine general medical examination at a health care facility    Other orders  -     dexlansoprazole (DEXILANT) 60 MG CPDR delayed release capsule; Take 1 capsule by mouth daily  -     ciprofloxacin (CIPRO) 500 MG tablet;  Take 1 tablet by mouth 2 times daily for 7 days

## 2020-04-28 ENCOUNTER — TELEPHONE (OUTPATIENT)
Dept: FAMILY MEDICINE CLINIC | Age: 70
End: 2020-04-28

## 2020-04-28 NOTE — TELEPHONE ENCOUNTER
Number given just hangs us after options, called # on back of card. States that there is charge for medication and pt needs to call to okay charge to be put on her credit card.  Called pt back and let her know

## 2020-05-21 ENCOUNTER — TELEPHONE (OUTPATIENT)
Dept: FAMILY MEDICINE CLINIC | Age: 70
End: 2020-05-21

## 2020-05-21 NOTE — TELEPHONE ENCOUNTER
calling want to know if you can do medical clearance for pt having surgery on 6/29.          [phone]

## 2020-06-18 ENCOUNTER — OFFICE VISIT (OUTPATIENT)
Dept: FAMILY MEDICINE CLINIC | Age: 70
End: 2020-06-18
Payer: MEDICARE

## 2020-06-18 VITALS
TEMPERATURE: 97.3 F | WEIGHT: 179.4 LBS | HEIGHT: 69 IN | SYSTOLIC BLOOD PRESSURE: 132 MMHG | BODY MASS INDEX: 26.57 KG/M2 | DIASTOLIC BLOOD PRESSURE: 62 MMHG | HEART RATE: 83 BPM | OXYGEN SATURATION: 98 %

## 2020-06-18 DIAGNOSIS — E78.5 HYPERLIPIDEMIA, UNSPECIFIED HYPERLIPIDEMIA TYPE: ICD-10-CM

## 2020-06-18 DIAGNOSIS — R73.03 BORDERLINE DIABETES: ICD-10-CM

## 2020-06-18 DIAGNOSIS — E06.3 HASHIMOTO'S DISEASE: ICD-10-CM

## 2020-06-18 LAB
ALBUMIN SERPL-MCNC: 4.5 G/DL (ref 3.5–4.6)
ALP BLD-CCNC: 67 U/L (ref 40–130)
ALT SERPL-CCNC: 19 U/L (ref 0–33)
ANION GAP SERPL CALCULATED.3IONS-SCNC: 13 MEQ/L (ref 9–15)
AST SERPL-CCNC: 17 U/L (ref 0–35)
BILIRUB SERPL-MCNC: 0.4 MG/DL (ref 0.2–0.7)
BUN BLDV-MCNC: 13 MG/DL (ref 8–23)
CALCIUM SERPL-MCNC: 9.9 MG/DL (ref 8.5–9.9)
CHLORIDE BLD-SCNC: 101 MEQ/L (ref 95–107)
CHOLESTEROL, TOTAL: 151 MG/DL (ref 0–199)
CO2: 26 MEQ/L (ref 20–31)
CREAT SERPL-MCNC: 0.69 MG/DL (ref 0.5–0.9)
GFR AFRICAN AMERICAN: >60
GFR NON-AFRICAN AMERICAN: >60
GLOBULIN: 2.4 G/DL (ref 2.3–3.5)
GLUCOSE BLD-MCNC: 74 MG/DL (ref 70–99)
HBA1C MFR BLD: 5.7 % (ref 4.8–5.9)
HDLC SERPL-MCNC: 59 MG/DL (ref 40–59)
LDL CHOLESTEROL CALCULATED: 78 MG/DL (ref 0–129)
POTASSIUM SERPL-SCNC: 4.5 MEQ/L (ref 3.4–4.9)
SODIUM BLD-SCNC: 140 MEQ/L (ref 135–144)
TOTAL PROTEIN: 6.9 G/DL (ref 6.3–8)
TRIGL SERPL-MCNC: 70 MG/DL (ref 0–150)
TSH REFLEX: 1.1 UIU/ML (ref 0.44–3.86)

## 2020-06-18 PROCEDURE — 1090F PRES/ABSN URINE INCON ASSESS: CPT | Performed by: NURSE PRACTITIONER

## 2020-06-18 PROCEDURE — G8399 PT W/DXA RESULTS DOCUMENT: HCPCS | Performed by: NURSE PRACTITIONER

## 2020-06-18 PROCEDURE — G8417 CALC BMI ABV UP PARAM F/U: HCPCS | Performed by: NURSE PRACTITIONER

## 2020-06-18 PROCEDURE — 1036F TOBACCO NON-USER: CPT | Performed by: NURSE PRACTITIONER

## 2020-06-18 PROCEDURE — 99214 OFFICE O/P EST MOD 30 MIN: CPT | Performed by: NURSE PRACTITIONER

## 2020-06-18 PROCEDURE — 90732 PPSV23 VACC 2 YRS+ SUBQ/IM: CPT | Performed by: NURSE PRACTITIONER

## 2020-06-18 PROCEDURE — 4040F PNEUMOC VAC/ADMIN/RCVD: CPT | Performed by: NURSE PRACTITIONER

## 2020-06-18 PROCEDURE — 1123F ACP DISCUSS/DSCN MKR DOCD: CPT | Performed by: NURSE PRACTITIONER

## 2020-06-18 PROCEDURE — G0009 ADMIN PNEUMOCOCCAL VACCINE: HCPCS | Performed by: NURSE PRACTITIONER

## 2020-06-18 PROCEDURE — 93000 ELECTROCARDIOGRAM COMPLETE: CPT | Performed by: NURSE PRACTITIONER

## 2020-06-18 PROCEDURE — 3017F COLORECTAL CA SCREEN DOC REV: CPT | Performed by: NURSE PRACTITIONER

## 2020-06-18 PROCEDURE — G8427 DOCREV CUR MEDS BY ELIG CLIN: HCPCS | Performed by: NURSE PRACTITIONER

## 2020-06-18 RX ORDER — MONTELUKAST SODIUM 10 MG/1
TABLET ORAL
Qty: 90 TABLET | Refills: 1 | Status: SHIPPED | OUTPATIENT
Start: 2020-06-18 | End: 2021-01-04 | Stop reason: SDUPTHER

## 2020-06-18 RX ORDER — DEXLANSOPRAZOLE 60 MG/1
60 CAPSULE, DELAYED RELEASE ORAL DAILY
Qty: 90 CAPSULE | Refills: 1 | Status: SHIPPED | OUTPATIENT
Start: 2020-06-18 | End: 2020-10-15 | Stop reason: SDUPTHER

## 2020-06-18 RX ORDER — FLUTICASONE PROPIONATE 50 MCG
2 SPRAY, SUSPENSION (ML) NASAL DAILY
Qty: 3 BOTTLE | Refills: 1 | Status: SHIPPED | OUTPATIENT
Start: 2020-06-18 | End: 2020-10-15 | Stop reason: SDUPTHER

## 2020-06-18 RX ORDER — CYCLOBENZAPRINE HCL 10 MG
10 TABLET ORAL 2 TIMES DAILY PRN
Qty: 90 TABLET | Refills: 0 | Status: SHIPPED | OUTPATIENT
Start: 2020-06-18 | End: 2020-10-15 | Stop reason: SDUPTHER

## 2020-06-18 RX ORDER — SIMVASTATIN 20 MG
TABLET ORAL
Qty: 90 TABLET | Refills: 1 | Status: SHIPPED | OUTPATIENT
Start: 2020-06-18 | End: 2020-10-15 | Stop reason: SDUPTHER

## 2020-06-18 RX ORDER — LEVOTHYROXINE SODIUM 0.07 MG/1
TABLET ORAL
Qty: 90 TABLET | Refills: 1 | Status: SHIPPED | OUTPATIENT
Start: 2020-06-18 | End: 2020-10-15 | Stop reason: SDUPTHER

## 2020-06-18 ASSESSMENT — ENCOUNTER SYMPTOMS
SHORTNESS OF BREATH: 0
COUGH: 0

## 2020-06-18 NOTE — PROGRESS NOTES
Never true    Transportation needs     Medical: No     Non-medical: No   Tobacco Use    Smoking status: Former Smoker     Packs/day: 0.33     Years: 5.00     Pack years: 1.65     Last attempt to quit: 1976     Years since quittin.5    Smokeless tobacco: Never Used   Substance and Sexual Activity    Alcohol use: Yes     Alcohol/week: 0.0 standard drinks     Comment: occasionally    Drug use: No    Sexual activity: Not Currently   Lifestyle    Physical activity     Days per week: None     Minutes per session: None    Stress: None   Relationships    Social connections     Talks on phone: None     Gets together: None     Attends Jew service: None     Active member of club or organization: None     Attends meetings of clubs or organizations: None     Relationship status: None    Intimate partner violence     Fear of current or ex partner: None     Emotionally abused: None     Physically abused: None     Forced sexual activity: None   Other Topics Concern    None   Social History Narrative    None     Current Outpatient Medications on File Prior to Visit   Medication Sig Dispense Refill    azelastine (ASTELIN) 0.1 % nasal spray 2 sprays by Nasal route 2 times daily 2 Spray in each nostril 2 Bottle 0    Methylcobalamin (B12-ACTIVE PO) Take 2,050 mcg by mouth      clobetasol (TEMOVATE) 0.05 % cream Apply topically 2 times daily prn Monday through Friday only, take weekends off (Patient taking differently: as needed Apply topically 2 times daily prn Monday through Friday only, take weekends off) 60 Tube 1    albuterol sulfate (PROAIR RESPICLICK) 189 (90 BASE) MCG/ACT aerosol powder inhalation Inhale 2 puffs into the lungs every 4 hours as needed for Wheezing or Shortness of Breath 1 Inhaler 0    magnesium oxide (MAG-OX) 400 MG tablet Take 1 tablet by mouth daily. 30 tablet 6    aspirin 325 MG tablet Take 325 mg by mouth 2 times daily.       cetirizine (ZYRTEC ALLERGY) 10 MG tablet Take 10 mg by mouth nightly.  calcium carbonate (OSCAL) 500 MG TABS tablet Take 500 mg by mouth daily. With D, K ZINC       No current facility-administered medications on file prior to visit. Allergies   Allergen Reactions    Peanut (Diagnostic) Anaphylaxis     Asthma    Aspirin Other (See Comments)     Asthma  ASTHMA    Cat Hair Extract     Ceclor [Cefaclor] Hives    Dust Mite Extract     Sulfa Antibiotics Hives       Review of Systems   Constitutional: Negative for fatigue. Respiratory: Negative for cough and shortness of breath. Cardiovascular: Negative for chest pain. Musculoskeletal: Negative for arthralgias. Objective  Vitals:    06/18/20 0804   BP: 132/62   Pulse: 83   Temp: 97.3 °F (36.3 °C)   SpO2: 98%   Weight: 179 lb 6.4 oz (81.4 kg)   Height: 5' 9\" (1.753 m)     Physical Exam  Vitals signs and nursing note reviewed. Constitutional:       Appearance: Normal appearance. She is normal weight. HENT:      Head: Normocephalic. Right Ear: Ear canal and external ear normal.      Left Ear: Ear canal and external ear normal.      Nose: Nose normal.      Mouth/Throat:      Mouth: Mucous membranes are moist.      Pharynx: Oropharynx is clear. Eyes:      Extraocular Movements: Extraocular movements intact. Conjunctiva/sclera: Conjunctivae normal.      Pupils: Pupils are equal, round, and reactive to light. Cardiovascular:      Rate and Rhythm: Normal rate and regular rhythm. Pulses: Normal pulses. Heart sounds: Normal heart sounds. Pulmonary:      Effort: Pulmonary effort is normal.      Breath sounds: Normal breath sounds. Skin:     General: Skin is warm. Neurological:      General: No focal deficit present. Mental Status: She is alert and oriented to person, place, and time. Mental status is at baseline. Psychiatric:         Mood and Affect: Mood normal.         Behavior: Behavior normal.         Thought Content:  Thought content normal. Sig: TAKE 1 TABLET EVERY EVENING     Dispense:  90 tablet     Refill:  1    montelukast (SINGULAIR) 10 MG tablet     Sig: TAKE 1 TABLET AT NIGHT     Dispense:  90 tablet     Refill:  1    dexlansoprazole (DEXILANT) 60 MG CPDR delayed release capsule     Sig: Take 1 capsule by mouth daily     Dispense:  90 capsule     Refill:  1    cyclobenzaprine (FLEXERIL) 10 MG tablet     Sig: Take 1 tablet by mouth 2 times daily as needed for Muscle spasms     Dispense:  90 tablet     Refill:  0     Side effects, adverse effects of the medication prescribed today, as well as treatment plan/ rationale and result expectations have been discussed with the patient who expresses understanding and desires to proceed. Close follow up to evaluate treatment results and for coordination of care. I have reviewed the patient's medical history in detail and updated the computerized patient record. As always, patient is advised that if symptoms worsen in any way they will proceed to the nearest emergency room. FU in 6 mos.      JENN Bolivar - CNP

## 2020-07-09 ENCOUNTER — TELEMEDICINE (OUTPATIENT)
Dept: FAMILY MEDICINE CLINIC | Age: 70
End: 2020-07-09
Payer: MEDICARE

## 2020-07-09 PROCEDURE — G8427 DOCREV CUR MEDS BY ELIG CLIN: HCPCS | Performed by: NURSE PRACTITIONER

## 2020-07-09 PROCEDURE — 1090F PRES/ABSN URINE INCON ASSESS: CPT | Performed by: NURSE PRACTITIONER

## 2020-07-09 PROCEDURE — 1123F ACP DISCUSS/DSCN MKR DOCD: CPT | Performed by: NURSE PRACTITIONER

## 2020-07-09 PROCEDURE — 99213 OFFICE O/P EST LOW 20 MIN: CPT | Performed by: NURSE PRACTITIONER

## 2020-07-09 PROCEDURE — G8399 PT W/DXA RESULTS DOCUMENT: HCPCS | Performed by: NURSE PRACTITIONER

## 2020-07-09 PROCEDURE — 3017F COLORECTAL CA SCREEN DOC REV: CPT | Performed by: NURSE PRACTITIONER

## 2020-07-09 PROCEDURE — 4040F PNEUMOC VAC/ADMIN/RCVD: CPT | Performed by: NURSE PRACTITIONER

## 2020-07-09 RX ORDER — TRAMADOL HYDROCHLORIDE 50 MG/1
50 TABLET ORAL EVERY 6 HOURS PRN
Qty: 28 TABLET | Refills: 0 | Status: SHIPPED | OUTPATIENT
Start: 2020-07-09 | End: 2020-07-16

## 2020-07-09 ASSESSMENT — ENCOUNTER SYMPTOMS
SHORTNESS OF BREATH: 0
COUGH: 0

## 2020-07-09 NOTE — PROGRESS NOTES
2020    TELEHEALTH EVALUATION -- Audio/Visual (During RVAYR-39 public health emergency)    Due to COVID 19 outbreak, patient's office visit was converted to a virtual visit. Patient was contacted and agreed to proceed with a virtual visit via [address] Visit  The risks and benefits of converting to a virtual visit were discussed in light of the current infectious disease epidemic. Patient also understood that insurance coverage and co-pays are up to their individual insurance plans. HPI:    Billy Zuniga (:  1950) has requested an audio/video evaluation for the following concern(s):    Recently had hip replacement. Struggling with sleep. Uncomfortable. Frustrated with how slow recovery process has been. No longer needing pain medication. Seeing PT at home. Next follow up Tuesday. Review of Systems   Constitutional: Negative for fatigue. Respiratory: Negative for cough and shortness of breath. Cardiovascular: Negative for chest pain. Musculoskeletal: Positive for arthralgias. Psychiatric/Behavioral: Positive for sleep disturbance. Prior to Visit Medications    Medication Sig Taking? Authorizing Provider   traMADol (ULTRAM) 50 MG tablet Take 1 tablet by mouth every 6 hours as needed for Pain for up to 7 days. Intended supply: 7 days.  Take lowest dose possible to manage pain Yes JENN Toth CNP   levothyroxine (SYNTHROID) 75 MCG tablet TAKE 1 TABLET DAILY Yes JENN Toth CNP   fluticasone (FLONASE) 50 MCG/ACT nasal spray 2 sprays by Nasal route daily Yes JENN Toth CNP   simvastatin (ZOCOR) 20 MG tablet TAKE 1 TABLET EVERY EVENING Yes JENN Toth CNP   montelukast (SINGULAIR) 10 MG tablet TAKE 1 TABLET AT NIGHT Yes JENN Toth CNP   dexlansoprazole (DEXILANT) 60 MG CPDR delayed release capsule Take 1 capsule by mouth daily Yes JENN Toth CNP   cyclobenzaprine (FLEXERIL) 10 MG tablet Take 1 tablet by mouth 2 times daily as needed for Muscle spasms Yes Daryle Gaucher, APRN - CNP   clobetasol (TEMOVATE) 0.05 % cream Apply topically 2 times daily prn Monday through Friday only, take weekends off Yes Ciaran Corona MD   magnesium oxide (MAG-OX) 400 MG tablet Take 1 tablet by mouth daily. Yes Everton  Holiday, DO   aspirin 325 MG tablet Take 325 mg by mouth 2 times daily. Yes Historical Provider, MD   cetirizine (ZYRTEC ALLERGY) 10 MG tablet Take 10 mg by mouth nightly. Yes Historical Provider, MD   calcium carbonate (OSCAL) 500 MG TABS tablet Take 500 mg by mouth daily. With D, K ZINC Yes Historical Provider, MD [employer] MISC Use AS DIRECTED  Historical Provider, MD   azelastine (ASTELIN) 0.1 % nasal spray 2 sprays by Nasal route 2 times daily 2 Spray in each nostril  Patient not taking: Reported on 2020  JENN Lebron CNP   Methylcobalamin (B12-ACTIVE PO) Take 2,050 mcg by mouth  Historical Provider, MD   albuterol sulfate (PROAIR RESPICLICK) 979 (90 BASE) MCG/ACT aerosol powder inhalation Inhale 2 puffs into the lungs every 4 hours as needed for Wheezing or Shortness of Breath  Patient not taking: Reported on 2020  Daryle Gaucher, APRN - CNP       Social History     Tobacco Use    Smoking status: Former Smoker     Packs/day: 0.33     Years: 5.00     Pack years: 1.65     Last attempt to quit: 1976     Years since quittin.6    Smokeless tobacco: Never Used   Substance Use Topics    Alcohol use:  Yes     Alcohol/week: 0.0 standard drinks     Comment: occasionally    Drug use: No        Allergies   Allergen Reactions    Peanut (Diagnostic) Anaphylaxis     Asthma    Aspirin Other (See Comments)     Asthma  ASTHMA    Cat Hair Extract     Ceclor [Cefaclor] Hives    Dust Mite Extract     Sulfa Antibiotics Hives   ,   Past Medical History:   Diagnosis Date    Allergic rhinitis     Asthma     Dizziness     Dr. Nic HAGER (degenerative joint disease)     Eczema     GERD (gastroesophageal reflux disease)     Hashimoto's disease     History of chronic sinusitis     Hx of colonic polyps     BENIGN    Hx of thyroid nodule     biospy benign    Hyperlipidemia     Lumbago     Mild tricuspid regurgitation 2014    echo    Normal cardiac stress test 2014    POTS (postural orthostatic tachycardia syndrome)     Dr. Reginaldo Shahid Prediabetes     Trigger finger of right hand     4th and 5th digits   ,   Past Surgical History:   Procedure Laterality Date    BREAST SURGERY  -LEFT    FIBROID TUMOR LUMPECTOMY     SECTION      COLONOSCOPY  ,,    GUM SURGERY      GUM IMPLANT    MOUTH SURGERY  2008-POLYP    NASAL POLYP SURGERY      KILLIAN-YULIYA POLYPS AND WINDOWS, TURBINATES TRIMMED    NASAL POLYP SURGERY  FESS BILAT FISTULA REPAIR    SINUS SURGERY      TONSILLECTOMY AND ADENOIDECTOMY     ,   Social History     Tobacco Use    Smoking status: Former Smoker     Packs/day: 0.33     Years: 5.00     Pack years: 1.65     Last attempt to quit: 1976     Years since quittin.6    Smokeless tobacco: Never Used   Substance Use Topics    Alcohol use:  Yes     Alcohol/week: 0.0 standard drinks     Comment: occasionally    Drug use: No   ,   Family History   Problem Relation Age of Onset    Cancer Other     Diabetes Other     Heart Disease Other     Diabetes Father     Thyroid Disease Father     Thyroid Disease Sister     Diabetes Sister        PHYSICAL EXAMINATION:  [ INSTRUCTIONS:  \"[x]\" Indicates a positive item  \"[]\" Indicates a negative item  -- DELETE ALL ITEMS NOT EXAMINED]  [x] Alert  [x] Oriented to person/place/time    [x] No apparent distress  [] Toxic appearing    [] Face flushed appearing [x] Sclera clear  [] Lips are cyanotic      [x] Breathing appears normal  [] Appears tachypneic      [] Rash on visible skin    [x] Cranial Nerves II-XII grossly intact    [x] Motor grossly intact in visible upper extremities    [x] Motor grossly intact in visible lower extremities    [x] Normal Mood  [] Anxious appearing    [] Depressed appearing  [] Confused appearing      [] Poor short term memory  [] Poor long term memory    [] OTHER:      Due to this being a TeleHealth encounter, evaluation of the following organ systems is limited: Vitals/Constitutional/EENT/Resp/CV/GI//MS/Neuro/Skin/Heme-Lymph-Imm. ASSESSMENT/PLAN:  1. History of hip replacement, unspecified laterality    - traMADol (ULTRAM) 50 MG tablet; Take 1 tablet by mouth every 6 hours as needed for Pain for up to 7 days. Intended supply: 7 days. Take lowest dose possible to manage pain  Dispense: 28 tablet; Refill: 0    2. Arthralgia of hip, unspecified laterality    - traMADol (ULTRAM) 50 MG tablet; Take 1 tablet by mouth every 6 hours as needed for Pain for up to 7 days. Intended supply: 7 days. Take lowest dose possible to manage pain  Dispense: 28 tablet; Refill: 0    Side effects, adverse effects of the medication prescribed today, as well as treatment plan/ rationale and result expectations have been discussed with the patient who expresses understanding and desires to proceed. Close follow up to evaluate treatment results and for coordination of care. I have reviewed the patient's medical history in detail and updated the computerized patient record. As always, patient is advised that if symptoms worsen in any way they will proceed to the nearest emergency room. An  electronic signature was used to authenticate this note. --[facility] , APRN - CNP on 7/9/2020 at 2:55 PM        Pursuant to the emergency declaration under the [facility], [facility] waiver authority and the [employer] Act, this Virtual  Visit was conducted, with patient's consent, to reduce the patient's risk of exposure to COVID-19 and provide continuity of care for an established patient.     Services were provided through a video synchronous discussion virtually to substitute for in-person clinic visit.

## 2020-10-09 ENCOUNTER — TELEPHONE (OUTPATIENT)
Dept: FAMILY MEDICINE CLINIC | Age: 70
End: 2020-10-09

## 2020-10-15 ENCOUNTER — PATIENT MESSAGE (OUTPATIENT)
Dept: FAMILY MEDICINE CLINIC | Age: 70
End: 2020-10-15

## 2020-10-15 RX ORDER — LEVOTHYROXINE SODIUM 0.07 MG/1
TABLET ORAL
Qty: 90 TABLET | Refills: 1 | Status: SHIPPED | OUTPATIENT
Start: 2020-10-15 | End: 2021-01-04 | Stop reason: SDUPTHER

## 2020-10-15 RX ORDER — FLUTICASONE PROPIONATE 50 MCG
2 SPRAY, SUSPENSION (ML) NASAL DAILY
Qty: 3 BOTTLE | Refills: 1 | Status: SHIPPED | OUTPATIENT
Start: 2020-10-15 | End: 2021-01-04 | Stop reason: SDUPTHER

## 2020-10-15 RX ORDER — SIMVASTATIN 20 MG
TABLET ORAL
Qty: 90 TABLET | Refills: 1 | Status: SHIPPED | OUTPATIENT
Start: 2020-10-15 | End: 2021-01-04 | Stop reason: SDUPTHER

## 2020-10-15 RX ORDER — CYCLOBENZAPRINE HCL 10 MG
10 TABLET ORAL 2 TIMES DAILY PRN
Qty: 90 TABLET | Refills: 0 | Status: SHIPPED | OUTPATIENT
Start: 2020-10-15 | End: 2020-10-20

## 2020-10-15 RX ORDER — DEXLANSOPRAZOLE 60 MG/1
60 CAPSULE, DELAYED RELEASE ORAL DAILY
Qty: 90 CAPSULE | Refills: 1 | Status: SHIPPED | OUTPATIENT
Start: 2020-10-15 | End: 2021-01-04 | Stop reason: SDUPTHER

## 2020-10-15 NOTE — TELEPHONE ENCOUNTER
From: Rob Dowd  To: JENN Santos - CNP  Sent: 10/15/2020 3:28 PM EDT  Subject: Non-Urgent Medical Question    I would like to get my mammogram done in October, early Nov. can I get a script for that. Also all my medications are out of refills. I really done know how that happened. I wont need refills until December, but would like to avoid the January rush, as I always have to get a tier reduction for my dexilant.

## 2020-10-21 ENCOUNTER — HOSPITAL ENCOUNTER (OUTPATIENT)
Dept: WOMENS IMAGING | Age: 70
Discharge: HOME | End: 2020-10-23
Payer: MEDICARE

## 2020-10-21 PROCEDURE — 77063 BREAST TOMOSYNTHESIS BI: CPT

## 2020-11-19 ENCOUNTER — TELEPHONE (OUTPATIENT)
Dept: FAMILY MEDICINE CLINIC | Age: 70
End: 2020-11-19

## 2020-11-19 NOTE — TELEPHONE ENCOUNTER
Patient calling. States a form is being faxed over from  [facility] For a chair extension and rejection. So far I have not seen any faxes. They are resending fax. And want this sent back today.      Phone number for pt. [phone]

## 2020-11-20 NOTE — TELEPHONE ENCOUNTER
I received the form after 4pm, form was not done by the end of the day. I will work on getting this done today.

## 2020-12-02 NOTE — TELEPHONE ENCOUNTER
Pt called to see if there were any updates on the packet that we received, regarding this. I advised the pt that I handed you the packet this morning, but that we have been very busy with patients today. She understood and said that she would like a call back with an update, when we get the chance.

## 2020-12-02 NOTE — TELEPHONE ENCOUNTER
I'm sorry, I do not know the patient well enough to be able to complete a prior auth for this medication.   Would suggest changing to something else temporarily

## 2021-01-04 ENCOUNTER — OFFICE VISIT (OUTPATIENT)
Dept: FAMILY MEDICINE CLINIC | Age: 71
End: 2021-01-04
Payer: MEDICARE

## 2021-01-04 VITALS
OXYGEN SATURATION: 98 % | HEIGHT: 69 IN | TEMPERATURE: 97.9 F | WEIGHT: 181.6 LBS | DIASTOLIC BLOOD PRESSURE: 76 MMHG | SYSTOLIC BLOOD PRESSURE: 124 MMHG | HEART RATE: 96 BPM | BODY MASS INDEX: 26.9 KG/M2

## 2021-01-04 DIAGNOSIS — E78.5 HYPERLIPIDEMIA, UNSPECIFIED HYPERLIPIDEMIA TYPE: ICD-10-CM

## 2021-01-04 DIAGNOSIS — R73.03 PREDIABETES: ICD-10-CM

## 2021-01-04 DIAGNOSIS — E53.8 B12 DEFICIENCY: ICD-10-CM

## 2021-01-04 DIAGNOSIS — J30.9 ALLERGIC RHINITIS, UNSPECIFIED SEASONALITY, UNSPECIFIED TRIGGER: ICD-10-CM

## 2021-01-04 DIAGNOSIS — E03.9 HYPOTHYROIDISM, UNSPECIFIED TYPE: ICD-10-CM

## 2021-01-04 DIAGNOSIS — M25.559 ARTHRALGIA OF HIP, UNSPECIFIED LATERALITY: ICD-10-CM

## 2021-01-04 DIAGNOSIS — Z00.00 ROUTINE GENERAL MEDICAL EXAMINATION AT A HEALTH CARE FACILITY: Primary | ICD-10-CM

## 2021-01-04 DIAGNOSIS — M25.512 ACUTE PAIN OF LEFT SHOULDER: ICD-10-CM

## 2021-01-04 LAB
ALBUMIN SERPL-MCNC: 4.4 G/DL (ref 3.5–4.6)
ALP BLD-CCNC: 69 U/L (ref 40–130)
ALT SERPL-CCNC: 24 U/L (ref 0–33)
ANION GAP SERPL CALCULATED.3IONS-SCNC: 13 MEQ/L (ref 9–15)
AST SERPL-CCNC: 22 U/L (ref 0–35)
BILIRUB SERPL-MCNC: 0.3 MG/DL (ref 0.2–0.7)
BUN BLDV-MCNC: 22 MG/DL (ref 8–23)
CALCIUM SERPL-MCNC: 10 MG/DL (ref 8.5–9.9)
CHLORIDE BLD-SCNC: 101 MEQ/L (ref 95–107)
CO2: 26 MEQ/L (ref 20–31)
CREAT SERPL-MCNC: 0.77 MG/DL (ref 0.5–0.9)
GFR AFRICAN AMERICAN: >60
GFR NON-AFRICAN AMERICAN: >60
GLOBULIN: 3.3 G/DL (ref 2.3–3.5)
GLUCOSE BLD-MCNC: 122 MG/DL (ref 70–99)
HBA1C MFR BLD: 5.7 % (ref 4.8–5.9)
POTASSIUM SERPL-SCNC: 5.4 MEQ/L (ref 3.4–4.9)
SODIUM BLD-SCNC: 140 MEQ/L (ref 135–144)
TOTAL PROTEIN: 7.7 G/DL (ref 6.3–8)
TSH REFLEX: 1.6 UIU/ML (ref 0.44–3.86)
VITAMIN B-12: 314 PG/ML (ref 232–1245)

## 2021-01-04 PROCEDURE — 4040F PNEUMOC VAC/ADMIN/RCVD: CPT | Performed by: NURSE PRACTITIONER

## 2021-01-04 PROCEDURE — 1123F ACP DISCUSS/DSCN MKR DOCD: CPT | Performed by: NURSE PRACTITIONER

## 2021-01-04 PROCEDURE — G0439 PPPS, SUBSEQ VISIT: HCPCS | Performed by: NURSE PRACTITIONER

## 2021-01-04 PROCEDURE — 3017F COLORECTAL CA SCREEN DOC REV: CPT | Performed by: NURSE PRACTITIONER

## 2021-01-04 PROCEDURE — G8484 FLU IMMUNIZE NO ADMIN: HCPCS | Performed by: NURSE PRACTITIONER

## 2021-01-04 RX ORDER — LEVOTHYROXINE SODIUM 0.07 MG/1
TABLET ORAL
Qty: 90 TABLET | Refills: 1 | Status: SHIPPED | OUTPATIENT
Start: 2021-01-04 | End: 2021-07-13 | Stop reason: SDUPTHER

## 2021-01-04 RX ORDER — SIMVASTATIN 20 MG
TABLET ORAL
Qty: 90 TABLET | Refills: 1 | Status: SHIPPED | OUTPATIENT
Start: 2021-01-04 | End: 2021-07-13 | Stop reason: SDUPTHER

## 2021-01-04 RX ORDER — DEXLANSOPRAZOLE 60 MG/1
60 CAPSULE, DELAYED RELEASE ORAL DAILY
Qty: 90 CAPSULE | Refills: 1 | Status: SHIPPED | OUTPATIENT
Start: 2021-01-04 | End: 2021-07-13 | Stop reason: SDUPTHER

## 2021-01-04 RX ORDER — FLUTICASONE PROPIONATE 50 MCG
2 SPRAY, SUSPENSION (ML) NASAL DAILY
Qty: 3 BOTTLE | Refills: 1 | Status: SHIPPED | OUTPATIENT
Start: 2021-01-04 | End: 2021-07-13 | Stop reason: SDUPTHER

## 2021-01-04 RX ORDER — CYCLOBENZAPRINE HCL 5 MG
5 TABLET ORAL NIGHTLY PRN
Qty: 90 TABLET | Refills: 0 | Status: SHIPPED | OUTPATIENT
Start: 2021-01-04 | End: 2021-04-04

## 2021-01-04 RX ORDER — MONTELUKAST SODIUM 10 MG/1
TABLET ORAL
Qty: 90 TABLET | Refills: 1 | Status: SHIPPED | OUTPATIENT
Start: 2021-01-04 | End: 2021-06-22

## 2021-01-04 ASSESSMENT — LIFESTYLE VARIABLES
HAS A RELATIVE, FRIEND, DOCTOR, OR ANOTHER HEALTH PROFESSIONAL EXPRESSED CONCERN ABOUT YOUR DRINKING OR SUGGESTED YOU CUT DOWN: 0
HOW OFTEN DO YOU HAVE A DRINK CONTAINING ALCOHOL: 3
HOW OFTEN DURING THE LAST YEAR HAVE YOU FAILED TO DO WHAT WAS NORMALLY EXPECTED FROM YOU BECAUSE OF DRINKING: 0
AUDIT TOTAL SCORE: 3
HOW OFTEN DURING THE LAST YEAR HAVE YOU HAD A FEELING OF GUILT OR REMORSE AFTER DRINKING: 0
HOW MANY STANDARD DRINKS CONTAINING ALCOHOL DO YOU HAVE ON A TYPICAL DAY: 0
HOW OFTEN DURING THE LAST YEAR HAVE YOU BEEN UNABLE TO REMEMBER WHAT HAPPENED THE NIGHT BEFORE BECAUSE YOU HAD BEEN DRINKING: 0
HAVE YOU OR SOMEONE ELSE BEEN INJURED AS A RESULT OF YOUR DRINKING: 0
HOW OFTEN DURING THE LAST YEAR HAVE YOU FOUND THAT YOU WERE NOT ABLE TO STOP DRINKING ONCE YOU HAD STARTED: 0

## 2021-01-04 ASSESSMENT — PATIENT HEALTH QUESTIONNAIRE - PHQ9
SUM OF ALL RESPONSES TO PHQ9 QUESTIONS 1 & 2: 3
4. FEELING TIRED OR HAVING LITTLE ENERGY: 1
8. MOVING OR SPEAKING SO SLOWLY THAT OTHER PEOPLE COULD HAVE NOTICED. OR THE OPPOSITE, BEING SO FIGETY OR RESTLESS THAT YOU HAVE BEEN MOVING AROUND A LOT MORE THAN USUAL: 0
7. TROUBLE CONCENTRATING ON THINGS, SUCH AS READING THE NEWSPAPER OR WATCHING TELEVISION: 0
SUM OF ALL RESPONSES TO PHQ QUESTIONS 1-9: 6
SUM OF ALL RESPONSES TO PHQ QUESTIONS 1-9: 6
1. LITTLE INTEREST OR PLEASURE IN DOING THINGS: 1
2. FEELING DOWN, DEPRESSED OR HOPELESS: 2

## 2021-01-04 NOTE — PATIENT INSTRUCTIONS
Personalized Preventive Plan for Carlita Pérez - 1/4/2021  Medicare offers a range of preventive health benefits. Some of the tests and screenings are paid in full while other may be subject to a deductible, co-insurance, and/or copay. Some of these benefits include a comprehensive review of your medical history including lifestyle, illnesses that may run in your family, and various assessments and screenings as appropriate. After reviewing your medical record and screening and assessments performed today your provider may have ordered immunizations, labs, imaging, and/or referrals for you. A list of these orders (if applicable) as well as your Preventive Care list are included within your After Visit Summary for your review. Other Preventive Recommendations:    · A preventive eye exam performed by an eye specialist is recommended every 1-2 years to screen for glaucoma; cataracts, macular degeneration, and other eye disorders. · A preventive dental visit is recommended every 6 months. · Try to get at least 150 minutes of exercise per week or 10,000 steps per day on a pedometer . · Order or download the FREE \"Exercise & Physical Activity: Your Everyday Guide\" from [employer]. Call 2-370.458.9842 or search [employer] online. · You need 5877-7123 mg of calcium and 4513-7354 IU of vitamin D per day. It is possible to meet your calcium requirement with diet alone, but a vitamin D supplement is usually necessary to meet this goal.  · When exposed to the sun, use a sunscreen that protects against both UVA and UVB radiation with an SPF of 30 or greater. Reapply every 2 to 3 hours or after sweating, drying off with a towel, or swimming. · Always wear a seat belt when traveling in a car. Always wear a helmet when riding a bicycle or motorcycle.

## 2021-01-04 NOTE — PROGRESS NOTES
Medicare Annual Wellness Visit  Name: Moni Portillo Date: 2021   MRN: [id number] Sex: Female   Age: 72 y.o. Ethnicity: Non-/Non    : 1950 Race: Sampson Young is here for Medicare AWV and Shoulder Pain (left shoulder pain and limited movement for over a year. )    Screenings for behavioral, psychosocial and functional/safety risks, and cognitive dysfunction are all negative except as indicated below. These results, as well as other patient data from the [address] form, are documented in Flowsheets linked to this Encounter. Overall pt doing well. Small hole in macula. Check up for eyes tomorrow. Saw ENT for hearing- stable. Left shoulder pain. Better after swimming. Has been bothering her for a few mos now    Allergies   Allergen Reactions    Peanut (Diagnostic) Anaphylaxis     Asthma    Aspirin Other (See Comments)     Asthma  ASTHMA    Cat Hair Extract     Ceclor [Cefaclor] Hives    Dust Mite Extract     Sulfa Antibiotics Hives    Adhesive Tape Rash         Prior to Visit Medications    Medication Sig Taking?  Authorizing Provider   cyclobenzaprine (FLEXERIL) 5 MG tablet Take 1 tablet by mouth nightly as needed for Muscle spasms Yes JENN Medina CNP   simvastatin (ZOCOR) 20 MG tablet TAKE 1 TABLET EVERY EVENING Yes JENN Medina CNP   dexlansoprazole (DEXILANT) 60 MG CPDR delayed release capsule Take 1 capsule by mouth daily Yes JENN Medina CNP   fluticasone (FLONASE) 50 MCG/ACT nasal spray 2 sprays by Nasal route daily Yes JENN Medina CNP   levothyroxine (SYNTHROID) 75 MCG tablet TAKE 1 TABLET DAILY Yes JENN Medina CNP   montelukast (SINGULAIR) 10 MG tablet TAKE 1 TABLET AT NIGHT Yes JENN Medina CNP   clobetasol (TEMOVATE) 0.05 % cream Apply topically 2 times daily prn Monday through Friday only, take weekends off Yes Everett Castaneda MD magnesium oxide (MAG-OX) 400 MG tablet Take 1 tablet by mouth daily. Yes Everton FERNANDEZ Holiday, DO   aspirin 325 MG tablet Take 325 mg by mouth 2 times daily. Yes Historical Provider, MD   cetirizine (ZYRTEC ALLERGY) 10 MG tablet Take 10 mg by mouth nightly. Yes Historical Provider, MD   calcium carbonate (OSCAL) 500 MG TABS tablet Take 500 mg by mouth daily.  With D, K ZINC Yes Historical Provider, MD   azelastine (ASTELIN) 0.1 % nasal spray 2 sprays by Nasal route 2 times daily 2 Spray in each nostril  Patient not taking: Reported on 2020  Asmita Guaman, JENN - CNP   albuterol sulfate (PROAIR RESPICLICK) 567 (90 BASE) MCG/ACT aerosol powder inhalation Inhale 2 puffs into the lungs every 4 hours as needed for Wheezing or Shortness of Breath  Patient not taking: Reported on 2020  Jamia Cousins, APRN - CNP         Past Medical History:   Diagnosis Date    Allergic rhinitis     Asthma     Dizziness     Dr. Keny Cloud DJD (degenerative joint disease)     Eczema     GERD (gastroesophageal reflux disease)     Hashimoto's disease     History of chronic sinusitis     Hx of colonic polyps     BENIGN    Hx of thyroid nodule     biospy benign    Hyperlipidemia     Lumbago     Mild tricuspid regurgitation 2014    echo    Normal cardiac stress test 2014    POTS (postural orthostatic tachycardia syndrome)     Dr. Keny Cloud Prediabetes     Trigger finger of right hand     4th and 5th digits       Past Surgical History:   Procedure Laterality Date    BREAST SURGERY  -LEFT    FIBROID TUMOR LUMPECTOMY     SECTION      COLONOSCOPY  ,,2010    GUM SURGERY      GUM IMPLANT    MOUTH SURGERY  2008-POLYP    NASAL POLYP SURGERY  1986    KILLIAN-YULIYA POLYPS AND WINDOWS, TURBINATES TRIMMED    NASAL POLYP SURGERY  FESS BILAT FISTULA REPAIR    SINUS SURGERY      TONSILLECTOMY AND ADENOIDECTOMY           Family History   Problem Relation Age of Onset    Cancer Other  Diabetes Other     Heart Disease Other     Diabetes Father     Thyroid Disease Father     Thyroid Disease Sister     Diabetes Sister        CareTeam (Including outside providers/suppliers regularly involved in providing care):   Patient Care Team:  JENN Clemente CNP as PCP - General (Family Nurse Practitioner)  JENN Clemente CNP as PCP - [facility]    Wt Readings from Last 3 Encounters:   01/04/21 181 lb 9.6 oz (82.4 kg)   06/18/20 179 lb 6.4 oz (81.4 kg)   01/02/20 181 lb 9.6 oz (82.4 kg)     Vitals:    01/04/21 1024   BP: 124/76   Site: Left Upper Arm   Position: Sitting   Cuff Size: Medium Adult   Pulse: 96   Temp: 97.9 °F (36.6 °C)   SpO2: 98%   Weight: 181 lb 9.6 oz (82.4 kg)   Height: 5' 9\" (1.753 m)     Body mass index is 26.82 kg/m². Based upon direct observation of the patient, evaluation of cognition reveals recent and remote memory intact.     General Appearance: alert and oriented to person, place and time, well developed and well- nourished, in no acute distress  Skin: warm and dry, no rash or erythema  Head: normocephalic and atraumatic  Eyes: pupils equal, round, and reactive to light, extraocular eye movements intact, conjunctivae normal  ENT: tympanic membrane, external ear and ear canal normal bilaterally, nose without deformity, nasal mucosa and turbinates normal without polyps  Neck: supple and non-tender without mass, no thyromegaly or thyroid nodules, no cervical lymphadenopathy  Pulmonary/Chest: clear to auscultation bilaterally- no wheezes, rales or rhonchi, normal air movement, no respiratory distress  Cardiovascular: normal rate, regular rhythm, normal S1 and S2, no murmurs, rubs, clicks, or gallops, distal pulses intact, no carotid bruits  Abdomen: soft, non-tender, non-distended, normal bowel sounds, no masses or organomegaly  Extremities: no cyanosis, clubbing or edema Do you have working smoke detectors?: Yes  Have all throw rugs been removed or fastened?: Yes  Do you have non-slip mats or surfaces in all bathtubs/showers?: (!) No  Do all of your stairways have a railing or banister?: Yes  Are your doorways, halls and stairs free of clutter?: Yes  Do you always fasten your seatbelt when you are in a car?: Yes  Safety Interventions:  · Home safety tips provided     Personalized Preventive Plan   Current Health Maintenance Status  Immunization History   Administered Date(s) Administered    Hepatitis A 05/13/2008, 11/20/2008    Hepatitis A Adult (Havrix, Vaqta) 11/20/2008    Hepatitis B 04/04/2014, 09/05/2014    Hepatitis B (Recombivax HB) 03/06/2014    Influenza Vaccine, unspecified formulation 10/26/2011, 08/28/2012, 09/11/2013, 10/07/2015    Influenza Virus Vaccine 12/10/2009, 10/01/2010, 08/28/2012    Influenza, High Dose (Fluzone 65 yrs and older) 09/25/2017, 09/16/2018    Influenza, Quadv, IM, PF (6 mo and older Fluzone, Flulaval, Fluarix, and 3 yrs and older Afluria) 09/07/2016    MMR 08/28/2012    Pneumococcal Conjugate 13-valent (Fzmpusl21) 01/03/2017    Pneumococcal Polysaccharide (Sniufpbxj00) 09/25/2014, 06/18/2020    Tdap (Boostrix, Adacel) 05/13/2008, 12/13/2010    Typhoid Live, Oral (Vivotif) 09/06/2012    Typhoid Vaccine 09/06/2012    Typhoid Vi capsular polysaccharide (Typhim VI) 05/13/2008    Yellow Fever (YF-Vax) 08/28/2012    Zoster Live (Zostavax) 01/19/2012    Zoster Recombinant (Shingrix) 02/12/2018, 06/07/2019        Health Maintenance   Topic Date Due    Annual Wellness Visit (AWV)  05/29/2019    A1C test (Diabetic or Prediabetic)  06/18/2021    Lipid screen  06/18/2021    TSH testing  06/18/2021    Breast cancer screen  10/21/2022    Colon cancer screen colonoscopy  06/17/2025    DTaP/Tdap/Td vaccine (4 - Td) 07/11/2029    DEXA (modify frequency per FRAX score)  Completed    Flu vaccine  Completed    Shingles Vaccine  Completed  Pneumococcal 65+ years Vaccine  Completed    Hepatitis C screen  Completed    Hepatitis A vaccine  Aged Out    Hepatitis B vaccine  Aged Out    Hib vaccine  Aged Out    Meningococcal (ACWY) vaccine  Aged Out     Recommendations for [employer] Due: see orders and patient instructions/AVS.  . Recommended screening schedule for the next 5-10 years is provided to the patient in written form: see Patient Ernesto Constantino was seen today for medicare awv and shoulder pain. Diagnoses and all orders for this visit:    Routine general medical examination at a health care facility    Arthralgia of hip, unspecified laterality  -     cyclobenzaprine (FLEXERIL) 5 MG tablet; Take 1 tablet by mouth nightly as needed for Muscle spasms    Acute pain of left shoulder  -     XR SHOULDER LEFT (MIN 2 VIEWS); Future    Hypothyroidism, unspecified type  -     TSH with Reflex; Future  -     levothyroxine (SYNTHROID) 75 MCG tablet; TAKE 1 TABLET DAILY    Hyperlipidemia, unspecified hyperlipidemia type  -     Comprehensive Metabolic Panel; Future  -     simvastatin (ZOCOR) 20 MG tablet; TAKE 1 TABLET EVERY EVENING    Prediabetes  -     Hemoglobin A1C; Future    B12 deficiency  -     Vitamin B12; Future    Allergic rhinitis, unspecified seasonality, unspecified trigger  -     fluticasone (FLONASE) 50 MCG/ACT nasal spray; 2 sprays by Nasal route daily  -     montelukast (SINGULAIR) 10 MG tablet; TAKE 1 TABLET AT NIGHT    Other orders  -     dexlansoprazole (DEXILANT) 60 MG CPDR delayed release capsule;  Take 1 capsule by mouth daily

## 2021-01-06 DIAGNOSIS — E87.5 HYPERKALEMIA: Primary | ICD-10-CM

## 2021-01-20 DIAGNOSIS — E87.5 HYPERKALEMIA: ICD-10-CM

## 2021-01-20 LAB
ALBUMIN SERPL-MCNC: 4.2 G/DL (ref 3.5–4.6)
ALP BLD-CCNC: 80 U/L (ref 40–130)
ALT SERPL-CCNC: 15 U/L (ref 0–33)
ANION GAP SERPL CALCULATED.3IONS-SCNC: 12 MEQ/L (ref 9–15)
AST SERPL-CCNC: 21 U/L (ref 0–35)
BILIRUB SERPL-MCNC: 0.3 MG/DL (ref 0.2–0.7)
BUN BLDV-MCNC: 19 MG/DL (ref 8–23)
CALCIUM SERPL-MCNC: 9.4 MG/DL (ref 8.5–9.9)
CHLORIDE BLD-SCNC: 101 MEQ/L (ref 95–107)
CO2: 27 MEQ/L (ref 20–31)
CREAT SERPL-MCNC: 0.79 MG/DL (ref 0.5–0.9)
GFR AFRICAN AMERICAN: >60
GFR NON-AFRICAN AMERICAN: >60
GLOBULIN: 2.9 G/DL (ref 2.3–3.5)
GLUCOSE BLD-MCNC: 83 MG/DL (ref 70–99)
POTASSIUM SERPL-SCNC: 4.4 MEQ/L (ref 3.4–4.9)
SODIUM BLD-SCNC: 140 MEQ/L (ref 135–144)
TOTAL PROTEIN: 7.1 G/DL (ref 6.3–8)

## 2021-06-22 DIAGNOSIS — J30.9 ALLERGIC RHINITIS, UNSPECIFIED SEASONALITY, UNSPECIFIED TRIGGER: ICD-10-CM

## 2021-06-22 RX ORDER — MONTELUKAST SODIUM 10 MG/1
TABLET ORAL
Qty: 90 TABLET | Refills: 1 | Status: SHIPPED | OUTPATIENT
Start: 2021-06-22 | End: 2021-07-13 | Stop reason: SDUPTHER

## 2021-07-13 ENCOUNTER — OFFICE VISIT (OUTPATIENT)
Dept: FAMILY MEDICINE CLINIC | Age: 71
End: 2021-07-13
Payer: MEDICARE

## 2021-07-13 VITALS
HEIGHT: 69 IN | OXYGEN SATURATION: 96 % | BODY MASS INDEX: 26.66 KG/M2 | HEART RATE: 80 BPM | SYSTOLIC BLOOD PRESSURE: 100 MMHG | WEIGHT: 180 LBS | DIASTOLIC BLOOD PRESSURE: 60 MMHG

## 2021-07-13 DIAGNOSIS — J30.9 ALLERGIC RHINITIS, UNSPECIFIED SEASONALITY, UNSPECIFIED TRIGGER: ICD-10-CM

## 2021-07-13 DIAGNOSIS — Z91.81 AT HIGH RISK FOR FALLS: ICD-10-CM

## 2021-07-13 DIAGNOSIS — L30.9 ECZEMA, UNSPECIFIED TYPE: ICD-10-CM

## 2021-07-13 DIAGNOSIS — E03.9 HYPOTHYROIDISM, UNSPECIFIED TYPE: ICD-10-CM

## 2021-07-13 DIAGNOSIS — R73.03 BORDERLINE DIABETES: Primary | ICD-10-CM

## 2021-07-13 DIAGNOSIS — E78.5 HYPERLIPIDEMIA, UNSPECIFIED HYPERLIPIDEMIA TYPE: ICD-10-CM

## 2021-07-13 PROCEDURE — 3017F COLORECTAL CA SCREEN DOC REV: CPT | Performed by: NURSE PRACTITIONER

## 2021-07-13 PROCEDURE — 4040F PNEUMOC VAC/ADMIN/RCVD: CPT | Performed by: NURSE PRACTITIONER

## 2021-07-13 PROCEDURE — G8427 DOCREV CUR MEDS BY ELIG CLIN: HCPCS | Performed by: NURSE PRACTITIONER

## 2021-07-13 PROCEDURE — 1036F TOBACCO NON-USER: CPT | Performed by: NURSE PRACTITIONER

## 2021-07-13 PROCEDURE — 1123F ACP DISCUSS/DSCN MKR DOCD: CPT | Performed by: NURSE PRACTITIONER

## 2021-07-13 PROCEDURE — 99214 OFFICE O/P EST MOD 30 MIN: CPT | Performed by: NURSE PRACTITIONER

## 2021-07-13 PROCEDURE — 1090F PRES/ABSN URINE INCON ASSESS: CPT | Performed by: NURSE PRACTITIONER

## 2021-07-13 PROCEDURE — G8399 PT W/DXA RESULTS DOCUMENT: HCPCS | Performed by: NURSE PRACTITIONER

## 2021-07-13 PROCEDURE — G8417 CALC BMI ABV UP PARAM F/U: HCPCS | Performed by: NURSE PRACTITIONER

## 2021-07-13 RX ORDER — DEXLANSOPRAZOLE 60 MG/1
60 CAPSULE, DELAYED RELEASE ORAL DAILY
Qty: 90 CAPSULE | Refills: 1 | Status: SHIPPED | OUTPATIENT
Start: 2021-07-13 | End: 2022-01-06 | Stop reason: SDUPTHER

## 2021-07-13 RX ORDER — SIMVASTATIN 20 MG
TABLET ORAL
Qty: 90 TABLET | Refills: 1 | Status: SHIPPED | OUTPATIENT
Start: 2021-07-13 | End: 2022-01-06 | Stop reason: SDUPTHER

## 2021-07-13 RX ORDER — MONTELUKAST SODIUM 10 MG/1
TABLET ORAL
Qty: 90 TABLET | Refills: 1 | Status: SHIPPED | OUTPATIENT
Start: 2021-07-13 | End: 2022-01-06 | Stop reason: SDUPTHER

## 2021-07-13 RX ORDER — LEVOTHYROXINE SODIUM 0.07 MG/1
TABLET ORAL
Qty: 90 TABLET | Refills: 1 | Status: SHIPPED | OUTPATIENT
Start: 2021-07-13 | End: 2022-01-06 | Stop reason: SDUPTHER

## 2021-07-13 RX ORDER — FLUTICASONE PROPIONATE 50 MCG
2 SPRAY, SUSPENSION (ML) NASAL DAILY
Qty: 3 BOTTLE | Refills: 1 | Status: SHIPPED | OUTPATIENT
Start: 2021-07-13 | End: 2022-01-06 | Stop reason: SDUPTHER

## 2021-07-13 SDOH — ECONOMIC STABILITY: FOOD INSECURITY: WITHIN THE PAST 12 MONTHS, YOU WORRIED THAT YOUR FOOD WOULD RUN OUT BEFORE YOU GOT MONEY TO BUY MORE.: NEVER TRUE

## 2021-07-13 SDOH — ECONOMIC STABILITY: FOOD INSECURITY: WITHIN THE PAST 12 MONTHS, THE FOOD YOU BOUGHT JUST DIDN'T LAST AND YOU DIDN'T HAVE MONEY TO GET MORE.: NEVER TRUE

## 2021-07-13 ASSESSMENT — ENCOUNTER SYMPTOMS
DIARRHEA: 0
CONSTIPATION: 0
COUGH: 0

## 2021-07-13 NOTE — PROGRESS NOTES
Subjective  Chief Complaint   Patient presents with    6 Month Follow-Up       Hyperlipidemia  This is a chronic problem. The current episode started more than 1 year ago. The problem is controlled. Recent lipid tests were reviewed and are normal. Exacerbating diseases include hypothyroidism. There are no known factors aggravating her hyperlipidemia. Current antihyperlipidemic treatment includes statins. The current treatment provides moderate improvement of lipids. There are no compliance problems. Risk factors for coronary artery disease include post-menopausal and dyslipidemia. Stopped calcium supplement. Worried about side effects. Taking thyroid med daily. No current symptoms of thyroid issues. Took some oral collagen. Asking if ok. Asking about B12 dosage from November. Lab Results   Component Value Date    WBC 14.0 (H) 06/30/2020    HGB 11.7 (L) 06/30/2020    HCT 36.7 06/30/2020     06/30/2020    CHOL 151 06/18/2020    TRIG 70 06/18/2020    HDL 59 06/18/2020    ALT 15 01/20/2021    AST 21 01/20/2021     01/20/2021    K 4.4 01/20/2021     01/20/2021    CREATININE 0.79 01/20/2021    BUN 19 01/20/2021    CO2 27 01/20/2021    TSH 2.860 01/10/2019    INR 1.1 06/19/2020    LABA1C 5.7 01/04/2021     Still having shoulder issues. Going to massage therapist and chiropractor for the shoulder. Still swimming at least 4 times/week. Overall pt feeling ok. Taking all medications as prescribed without issues.     Patient Active Problem List    Diagnosis Date Noted    Lumbosacral spondylosis without myelopathy 08/11/2017    Osteoarthritis of spine with radiculopathy, lumbar region 08/10/2017    Hashimoto's disease     Prediabetes     POTS (postural orthostatic tachycardia syndrome)     Trigger finger of right hand     Palpitations 12/12/2014    Hypothyroidism 08/25/2014    GERD (gastroesophageal reflux disease) 08/25/2014    Allergic rhinitis 08/25/2014    Asthma 2014    Osteoarthritis 2014    Lumbago 2014    Hyperlipidemia 2014     Past Medical History:   Diagnosis Date    Allergic rhinitis     Asthma     Dizziness     Dr. Andris Essex DJD (degenerative joint disease)     Eczema     GERD (gastroesophageal reflux disease)     Hashimoto's disease     History of chronic sinusitis     Hx of colonic polyps     BENIGN    Hx of thyroid nodule     biospy benign    Hyperlipidemia     Lumbago     Mild tricuspid regurgitation 2014    echo    Normal cardiac stress test 2014    POTS (postural orthostatic tachycardia syndrome)     Dr. Andris Essex Prediabetes     Trigger finger of right hand     4th and 5th digits     Past Surgical History:   Procedure Laterality Date    BREAST SURGERY  -LEFT    FIBROID TUMOR LUMPECTOMY     SECTION      COLONOSCOPY  ,,    GUM SURGERY      GUM IMPLANT    MOUTH SURGERY  -POLYP    NASAL POLYP SURGERY  1986    KILLIAN-YULIYA POLYPS AND WINDOWS, TURBINATES TRIMMED    NASAL POLYP SURGERY  FESS BILAT FISTULA REPAIR    SINUS SURGERY      TONSILLECTOMY AND ADENOIDECTOMY       Family History   Problem Relation Age of Onset    Cancer Other     Diabetes Other     Heart Disease Other     Diabetes Father     Thyroid Disease Father     Thyroid Disease Sister     Diabetes Sister      Social History     Socioeconomic History    Marital status:      Spouse name: None    Number of children: None    Years of education: None    Highest education level: None   Occupational History    None   Tobacco Use    Smoking status: Former Smoker     Packs/day: 0.33     Years: 5.00     Pack years: 1.65     Quit date: 1976     Years since quittin.6    Smokeless tobacco: Never Used   Substance and Sexual Activity    Alcohol use:  Yes     Alcohol/week: 0.0 standard drinks     Comment: occasionally    Drug use: No    Sexual activity: Not Currently   Other Topics Concern    facility-administered medications on file prior to visit. Allergies   Allergen Reactions    Peanut (Diagnostic) Anaphylaxis     Asthma    Aspirin Other (See Comments)     Asthma  ASTHMA    Cat Hair Extract     Ceclor [Cefaclor] Hives    Dust Mite Extract     Sulfa Antibiotics Hives    Adhesive Tape Rash       Review of Systems   Constitutional: Negative for fatigue. Respiratory: Negative for cough. Gastrointestinal: Negative for constipation and diarrhea. Objective  Vitals:    07/13/21 1345   BP: 100/60   Pulse: 80   SpO2: 96%   Weight: 180 lb (81.6 kg)   Height: 5' 9\" (1.753 m)     Physical Exam  Vitals and nursing note reviewed. Constitutional:       Appearance: Normal appearance. She is normal weight. HENT:      Head: Normocephalic. Nose: Nose normal.      Mouth/Throat:      Mouth: Mucous membranes are moist.      Pharynx: Oropharynx is clear. Eyes:      Extraocular Movements: Extraocular movements intact. Conjunctiva/sclera: Conjunctivae normal.      Pupils: Pupils are equal, round, and reactive to light. Cardiovascular:      Rate and Rhythm: Normal rate and regular rhythm. Pulses: Normal pulses. Heart sounds: Normal heart sounds. Pulmonary:      Effort: Pulmonary effort is normal.      Breath sounds: Normal breath sounds. Skin:     General: Skin is warm. Neurological:      General: No focal deficit present. Mental Status: She is alert and oriented to person, place, and time. Mental status is at baseline. Psychiatric:         Mood and Affect: Mood normal.         Behavior: Behavior normal.         Thought Content: Thought content normal.         Judgment: Judgment normal.       Assessment & Plan     Diagnosis Orders   1. Borderline diabetes  Hemoglobin A1C   2. Eczema, unspecified type     3. Allergic rhinitis, unspecified seasonality, unspecified trigger  fluticasone (FLONASE) 50 MCG/ACT nasal spray    montelukast (SINGULAIR) 10 MG tablet   4. in 6 mos or sooner pamellan. JENN Hoff CNP    On the basis of positive falls risk screening, assessment and plan is as follows: home safety tips provided.

## 2021-07-21 DIAGNOSIS — E78.5 HYPERLIPIDEMIA, UNSPECIFIED HYPERLIPIDEMIA TYPE: ICD-10-CM

## 2021-07-21 DIAGNOSIS — E03.9 HYPOTHYROIDISM, UNSPECIFIED TYPE: ICD-10-CM

## 2021-07-21 DIAGNOSIS — R73.03 BORDERLINE DIABETES: ICD-10-CM

## 2021-07-21 LAB
ALBUMIN SERPL-MCNC: 4.5 G/DL (ref 3.5–4.6)
ALP BLD-CCNC: 68 U/L (ref 40–130)
ALT SERPL-CCNC: 17 U/L (ref 0–33)
ANION GAP SERPL CALCULATED.3IONS-SCNC: 10 MEQ/L (ref 9–15)
AST SERPL-CCNC: 24 U/L (ref 0–35)
BILIRUB SERPL-MCNC: 0.4 MG/DL (ref 0.2–0.7)
BUN BLDV-MCNC: 14 MG/DL (ref 8–23)
CALCIUM SERPL-MCNC: 9.8 MG/DL (ref 8.5–9.9)
CHLORIDE BLD-SCNC: 99 MEQ/L (ref 95–107)
CHOLESTEROL, TOTAL: 173 MG/DL (ref 0–199)
CO2: 28 MEQ/L (ref 20–31)
CREAT SERPL-MCNC: 0.74 MG/DL (ref 0.5–0.9)
GFR AFRICAN AMERICAN: >60
GFR NON-AFRICAN AMERICAN: >60
GLOBULIN: 2.8 G/DL (ref 2.3–3.5)
GLUCOSE BLD-MCNC: 93 MG/DL (ref 70–99)
HBA1C MFR BLD: 5.7 % (ref 4.8–5.9)
HDLC SERPL-MCNC: 61 MG/DL (ref 40–59)
LDL CHOLESTEROL CALCULATED: 97 MG/DL (ref 0–129)
POTASSIUM SERPL-SCNC: 4.8 MEQ/L (ref 3.4–4.9)
SODIUM BLD-SCNC: 137 MEQ/L (ref 135–144)
TOTAL PROTEIN: 7.3 G/DL (ref 6.3–8)
TRIGL SERPL-MCNC: 75 MG/DL (ref 0–150)
TSH REFLEX: 1.47 UIU/ML (ref 0.44–3.86)

## 2021-09-20 ENCOUNTER — OFFICE VISIT (OUTPATIENT)
Dept: FAMILY MEDICINE CLINIC | Age: 71
End: 2021-09-20
Payer: MEDICARE

## 2021-09-20 VITALS
SYSTOLIC BLOOD PRESSURE: 124 MMHG | OXYGEN SATURATION: 96 % | DIASTOLIC BLOOD PRESSURE: 72 MMHG | BODY MASS INDEX: 26.07 KG/M2 | HEIGHT: 69 IN | HEART RATE: 86 BPM | WEIGHT: 176 LBS

## 2021-09-20 DIAGNOSIS — K21.9 GASTROESOPHAGEAL REFLUX DISEASE, UNSPECIFIED WHETHER ESOPHAGITIS PRESENT: ICD-10-CM

## 2021-09-20 DIAGNOSIS — F32.A ANXIETY AND DEPRESSION: Primary | ICD-10-CM

## 2021-09-20 DIAGNOSIS — Z23 NEED FOR INFLUENZA VACCINATION: ICD-10-CM

## 2021-09-20 DIAGNOSIS — F41.9 ANXIETY AND DEPRESSION: Primary | ICD-10-CM

## 2021-09-20 PROCEDURE — G8417 CALC BMI ABV UP PARAM F/U: HCPCS | Performed by: NURSE PRACTITIONER

## 2021-09-20 PROCEDURE — 1036F TOBACCO NON-USER: CPT | Performed by: NURSE PRACTITIONER

## 2021-09-20 PROCEDURE — 3017F COLORECTAL CA SCREEN DOC REV: CPT | Performed by: NURSE PRACTITIONER

## 2021-09-20 PROCEDURE — G8399 PT W/DXA RESULTS DOCUMENT: HCPCS | Performed by: NURSE PRACTITIONER

## 2021-09-20 PROCEDURE — 1123F ACP DISCUSS/DSCN MKR DOCD: CPT | Performed by: NURSE PRACTITIONER

## 2021-09-20 PROCEDURE — G8427 DOCREV CUR MEDS BY ELIG CLIN: HCPCS | Performed by: NURSE PRACTITIONER

## 2021-09-20 PROCEDURE — 4040F PNEUMOC VAC/ADMIN/RCVD: CPT | Performed by: NURSE PRACTITIONER

## 2021-09-20 PROCEDURE — 90694 VACC AIIV4 NO PRSRV 0.5ML IM: CPT | Performed by: NURSE PRACTITIONER

## 2021-09-20 PROCEDURE — G0008 ADMIN INFLUENZA VIRUS VAC: HCPCS | Performed by: NURSE PRACTITIONER

## 2021-09-20 PROCEDURE — 1090F PRES/ABSN URINE INCON ASSESS: CPT | Performed by: NURSE PRACTITIONER

## 2021-09-20 PROCEDURE — 99214 OFFICE O/P EST MOD 30 MIN: CPT | Performed by: NURSE PRACTITIONER

## 2021-09-20 RX ORDER — ESCITALOPRAM OXALATE 5 MG/1
5 TABLET ORAL DAILY
Qty: 30 TABLET | Refills: 5 | Status: SHIPPED | OUTPATIENT
Start: 2021-09-20 | End: 2021-10-20 | Stop reason: SDUPTHER

## 2021-09-20 ASSESSMENT — ENCOUNTER SYMPTOMS
COUGH: 1
DIARRHEA: 0
GLOBUS SENSATION: 1
CONSTIPATION: 0

## 2021-09-20 NOTE — PROGRESS NOTES
Subjective  Chief Complaint   Patient presents with    Discuss Medications    GI Problem     pt states alot of heartburn lately, has seen GI.  Immunizations       Gastroesophageal Reflux  She complains of coughing, dysphagia and globus sensation. She reports no chest pain. This is a chronic problem. The current episode started more than 1 year ago. The problem occurs frequently. The problem has been gradually worsening. The symptoms are aggravated by certain foods. Pertinent negatives include no fatigue. Risk factors include caffeine use. She has tried a histamine-2 antagonist and a PPI for the symptoms. The treatment provided mild relief. Started avoiding all trigger foods recently. Has been eating a lot of them. Had been under a lot of stress lately as well. Saw GI. Also has been using H2 blocker a couple of times a day. Seems to be helping out some. They recommended a few additional tests    Admits to some more anxiety lately. Also admits to \"not having any fun\" Thinks there may be a level of depression as well. Open to discussing medication.         Patient Active Problem List    Diagnosis Date Noted    Lumbosacral spondylosis without myelopathy 08/11/2017    Osteoarthritis of spine with radiculopathy, lumbar region 08/10/2017    Hashimoto's disease     Prediabetes     POTS (postural orthostatic tachycardia syndrome)     Trigger finger of right hand     Palpitations 12/12/2014    Hypothyroidism 08/25/2014    GERD (gastroesophageal reflux disease) 08/25/2014    Allergic rhinitis 08/25/2014    Asthma 08/25/2014    Osteoarthritis 08/25/2014    Lumbago 08/25/2014    Hyperlipidemia 08/25/2014     Past Medical History:   Diagnosis Date    Allergic rhinitis     Asthma     Dizziness     Dr. Juani Munoz DJD (degenerative joint disease)     Eczema     GERD (gastroesophageal reflux disease)     Hashimoto's disease     History of chronic sinusitis     Hx of colonic polyps     BENIGN    Hx of thyroid nodule     biospy benign    Hyperlipidemia     Lumbago     Mild tricuspid regurgitation 2014    echo    Normal cardiac stress test 2014    POTS (postural orthostatic tachycardia syndrome)     Dr. Oniel Pardo Prediabetes     Trigger finger of right hand     4th and 5th digits     Past Surgical History:   Procedure Laterality Date    BREAST SURGERY  -LEFT    FIBROID TUMOR LUMPECTOMY     SECTION      COLONOSCOPY  ,,2010    GUM SURGERY      GUM IMPLANT    MOUTH SURGERY  2008-POLYP    NASAL POLYP SURGERY  1986    KILLIAN-YULIYA POLYPS AND WINDOWS, TURBINATES TRIMMED    NASAL POLYP SURGERY  FESS BILAT FISTULA REPAIR    SINUS SURGERY      TONSILLECTOMY AND ADENOIDECTOMY       Family History   Problem Relation Age of Onset    Cancer Other     Diabetes Other     Heart Disease Other     Diabetes Father     Thyroid Disease Father     Thyroid Disease Sister     Diabetes Sister      Social History     Socioeconomic History    Marital status:      Spouse name: None    Number of children: None    Years of education: None    Highest education level: None   Occupational History    None   Tobacco Use    Smoking status: Former Smoker     Packs/day: 0.33     Years: 5.00     Pack years: 1.65     Quit date: 1976     Years since quittin.8    Smokeless tobacco: Never Used   Substance and Sexual Activity    Alcohol use:  Yes     Alcohol/week: 0.0 standard drinks     Comment: occasionally    Drug use: No    Sexual activity: Not Currently   Other Topics Concern    None   Social History Narrative    None     Social Determinants of Health     Financial Resource Strain: Low Risk     Difficulty of Paying Living Expenses: Not hard at all   Food Insecurity: No Food Insecurity    Worried About Running Out of Food in the Last Year: Never true    Tobi of Food in the Last Year: Never true   Transportation Needs:     Lack of Transportation (Medical):    [address] [place] [address] Lack of Transportation (Non-Medical):    Physical Activity:     Days of Exercise per Week:     Minutes of Exercise per Session:    Stress:     Feeling of Stress :    Social Connections:     Frequency of Communication with Friends and Family:     Frequency of Social Gatherings with Friends and Family:     Attends [employer] Services:     Active Member of Clubs or Organizations:     Attends Club or Organization Meetings:     Marital Status:    Intimate Partner Violence:     Fear of Current or Ex-Partner:     Emotionally Abused:     Physically Abused:     Sexually Abused:      Current Outpatient Medications on File Prior to Visit   Medication Sig Dispense Refill    fluticasone (FLONASE) 50 MCG/ACT nasal spray 2 sprays by Nasal route daily 3 Bottle 1    dexlansoprazole (DEXILANT) 60 MG CPDR delayed release capsule Take 1 capsule by mouth daily 90 capsule 1    levothyroxine (SYNTHROID) 75 MCG tablet TAKE 1 TABLET DAILY 90 tablet 1    montelukast (SINGULAIR) 10 MG tablet TAKE 1 TABLET NIGHTLY 90 tablet 1    simvastatin (ZOCOR) 20 MG tablet TAKE 1 TABLET EVERY EVENING 90 tablet 1    azelastine (ASTELIN) 0.1 % nasal spray 2 sprays by Nasal route 2 times daily 2 Spray in each nostril 2 Bottle 0    albuterol sulfate (PROAIR RESPICLICK) 893 (90 BASE) MCG/ACT aerosol powder inhalation Inhale 2 puffs into the lungs every 4 hours as needed for Wheezing or Shortness of Breath 1 Inhaler 0    magnesium oxide (MAG-OX) 400 MG tablet Take 1 tablet by mouth daily. 30 tablet 6    aspirin 325 MG tablet Take 325 mg by mouth 2 times daily.  cetirizine (ZYRTEC ALLERGY) 10 MG tablet Take 10 mg by mouth nightly.  clobetasol (TEMOVATE) 0.05 % cream Apply topically 2 times daily prn Monday through Friday only, take weekends off (Patient not taking: Reported on 9/20/2021) 60 Tube 1    calcium carbonate (OSCAL) 500 MG TABS tablet Take 500 mg by mouth daily.  With D, K ZINC (Patient not taking: Reported on 9/20/2021)       No current facility-administered medications on file prior to visit. Allergies   Allergen Reactions    Peanut (Diagnostic) Anaphylaxis     Asthma    Aspirin Other (See Comments)     Asthma  ASTHMA    Cat Hair Extract     Ceclor [Cefaclor] Hives    Dust Mite Extract     Sulfa Antibiotics Hives    Adhesive Tape Rash       Review of Systems   Constitutional: Negative for fatigue. Respiratory: Positive for cough. Cardiovascular: Negative for chest pain. Gastrointestinal: Positive for dysphagia. Negative for constipation and diarrhea. Psychiatric/Behavioral: Positive for dysphoric mood. The patient is nervous/anxious. Objective  Vitals:    09/20/21 0846   BP: 124/72   Pulse: 86   SpO2: 96%   Weight: 176 lb (79.8 kg)   Height: 5' 9\" (1.753 m)     Physical Exam  Vitals and nursing note reviewed. Constitutional:       Appearance: Normal appearance. She is normal weight. HENT:      Head: Normocephalic. Nose: Nose normal.      Mouth/Throat:      Mouth: Mucous membranes are moist.      Pharynx: Oropharynx is clear. Eyes:      Extraocular Movements: Extraocular movements intact. Conjunctiva/sclera: Conjunctivae normal.      Pupils: Pupils are equal, round, and reactive to light. Cardiovascular:      Rate and Rhythm: Normal rate and regular rhythm. Pulses: Normal pulses. Heart sounds: Normal heart sounds. Pulmonary:      Effort: Pulmonary effort is normal.      Breath sounds: Normal breath sounds. Skin:     General: Skin is warm. Neurological:      General: No focal deficit present. Mental Status: She is alert and oriented to person, place, and time. Mental status is at baseline. Psychiatric:         Mood and Affect: Mood normal.         Behavior: Behavior normal.         Thought Content: Thought content normal.         Judgment: Judgment normal.       Assessment & Plan     Diagnosis Orders   1.  Anxiety and depression  escitalopram (LEXAPRO) 5 MG tablet   2. Need for influenza vaccination  INFLUENZA, QUADV, ADJUVANTED, 65 YRS =, IM, PF, PREFILL SYR, 0.5ML (FLUAD)   3. Gastroesophageal reflux disease, unspecified whether esophagitis present  Pt is going to cont dexilant and H2 blocker prn. Will really work on diet and stress levels. Will fu with GI as previously scheduled as well       Orders Placed This Encounter   Procedures    INFLUENZA, QUADV, ADJUVANTED, 72 YRS =, IM, PF, PREFILL SYR, 0.5ML (FLUAD)       Orders Placed This Encounter   Medications    escitalopram (LEXAPRO) 5 MG tablet     Sig: Take 1 tablet by mouth daily     Dispense:  30 tablet     Refill:  5     Side effects, adverse effects of the medication prescribed today, as well as treatment plan/ rationale and result expectations have been discussed with the patient who expresses understanding and desires to proceed. Close follow up to evaluate treatment results and for coordination of care. I have reviewed the patient's medical history in detail and updated the computerized patient record. As always, patient is advised that if symptoms worsen in any way they will proceed to the nearest emergency room. Return in about 4 weeks (around 10/18/2021).     Renata Maguire, APRN - CNP

## 2021-10-20 ENCOUNTER — OFFICE VISIT (OUTPATIENT)
Dept: FAMILY MEDICINE CLINIC | Age: 71
End: 2021-10-20
Payer: MEDICARE

## 2021-10-20 VITALS
OXYGEN SATURATION: 98 % | SYSTOLIC BLOOD PRESSURE: 120 MMHG | DIASTOLIC BLOOD PRESSURE: 70 MMHG | HEIGHT: 69 IN | BODY MASS INDEX: 26.66 KG/M2 | HEART RATE: 77 BPM | WEIGHT: 180 LBS

## 2021-10-20 DIAGNOSIS — F41.9 ANXIETY AND DEPRESSION: ICD-10-CM

## 2021-10-20 DIAGNOSIS — F41.9 ANXIETY: ICD-10-CM

## 2021-10-20 DIAGNOSIS — G47.00 INSOMNIA, UNSPECIFIED TYPE: Primary | ICD-10-CM

## 2021-10-20 DIAGNOSIS — F32.A ANXIETY AND DEPRESSION: ICD-10-CM

## 2021-10-20 PROCEDURE — G8427 DOCREV CUR MEDS BY ELIG CLIN: HCPCS | Performed by: NURSE PRACTITIONER

## 2021-10-20 PROCEDURE — 99214 OFFICE O/P EST MOD 30 MIN: CPT | Performed by: NURSE PRACTITIONER

## 2021-10-20 PROCEDURE — G8399 PT W/DXA RESULTS DOCUMENT: HCPCS | Performed by: NURSE PRACTITIONER

## 2021-10-20 PROCEDURE — G8484 FLU IMMUNIZE NO ADMIN: HCPCS | Performed by: NURSE PRACTITIONER

## 2021-10-20 PROCEDURE — G8417 CALC BMI ABV UP PARAM F/U: HCPCS | Performed by: NURSE PRACTITIONER

## 2021-10-20 PROCEDURE — 3017F COLORECTAL CA SCREEN DOC REV: CPT | Performed by: NURSE PRACTITIONER

## 2021-10-20 PROCEDURE — 1036F TOBACCO NON-USER: CPT | Performed by: NURSE PRACTITIONER

## 2021-10-20 PROCEDURE — 4040F PNEUMOC VAC/ADMIN/RCVD: CPT | Performed by: NURSE PRACTITIONER

## 2021-10-20 PROCEDURE — 1090F PRES/ABSN URINE INCON ASSESS: CPT | Performed by: NURSE PRACTITIONER

## 2021-10-20 PROCEDURE — 1123F ACP DISCUSS/DSCN MKR DOCD: CPT | Performed by: NURSE PRACTITIONER

## 2021-10-20 RX ORDER — TRAZODONE HYDROCHLORIDE 100 MG/1
100 TABLET ORAL NIGHTLY PRN
Qty: 30 TABLET | Refills: 2 | Status: SHIPPED | OUTPATIENT
Start: 2021-10-20 | End: 2022-01-06 | Stop reason: CLARIF

## 2021-10-20 RX ORDER — HYDROXYZINE PAMOATE 25 MG/1
25 CAPSULE ORAL NIGHTLY PRN
Qty: 30 CAPSULE | Refills: 2 | Status: SHIPPED | OUTPATIENT
Start: 2021-10-20

## 2021-10-20 RX ORDER — ESCITALOPRAM OXALATE 5 MG/1
5 TABLET ORAL DAILY
Qty: 90 TABLET | Refills: 1 | Status: SHIPPED | OUTPATIENT
Start: 2021-10-20 | End: 2022-01-06 | Stop reason: SDUPTHER

## 2021-10-20 ASSESSMENT — ENCOUNTER SYMPTOMS
GASTROINTESTINAL NEGATIVE: 1
CHEST TIGHTNESS: 0
EYES NEGATIVE: 1
ALLERGIC/IMMUNOLOGIC NEGATIVE: 1
SHORTNESS OF BREATH: 0

## 2021-10-20 NOTE — PROGRESS NOTES
Subjective  Chief Complaint   Patient presents with    1 Month Follow-Up       HPI    Laverda Bump is better/under control. Lexapro is helping some. Has struggled with sleep recently. Worse with travel. Has felt \"panicky\" in middle of night  Falling asleep initially \"ok\"  Wakes up frequently throughout the night. Takes 1/2 tablet PO of tylenol PM at nights. Feels like it may be stress related.    Has not has sleep study done       Patient Active Problem List    Diagnosis Date Noted    Lumbosacral spondylosis without myelopathy 2017    Osteoarthritis of spine with radiculopathy, lumbar region 08/10/2017    Hashimoto's disease     Prediabetes     POTS (postural orthostatic tachycardia syndrome)     Trigger finger of right hand     Palpitations 2014    Hypothyroidism 2014    GERD (gastroesophageal reflux disease) 2014    Allergic rhinitis 2014    Asthma 2014    Osteoarthritis 2014    Lumbago 2014    Hyperlipidemia 2014     Past Medical History:   Diagnosis Date    Allergic rhinitis     Asthma     Dizziness     Dr. Serena Kent DJD (degenerative joint disease)     Eczema     GERD (gastroesophageal reflux disease)     Hashimoto's disease     History of chronic sinusitis     Hx of colonic polyps     BENIGN    Hx of thyroid nodule     biospy benign    Hyperlipidemia     Lumbago     Mild tricuspid regurgitation 2014    echo    Normal cardiac stress test 2014    POTS (postural orthostatic tachycardia syndrome)     Dr. Serena Kent Prediabetes     Trigger finger of right hand     4th and 5th digits     Past Surgical History:   Procedure Laterality Date    BREAST SURGERY  -LEFT    FIBROID TUMOR LUMPECTOMY     SECTION      COLONOSCOPY  ,,2010    GUM SURGERY      GUM IMPLANT    MOUTH SURGERY  -POLYP    NASAL POLYP SURGERY      KILLIAN-YULIYA POLYPS AND WINDOWS, TURBINATES TRIMMED    NASAL POLYP SURGERY  FESS BILAT FISTULA REPAIR    SINUS SURGERY      TONSILLECTOMY AND ADENOIDECTOMY       Family History   Problem Relation Age of Onset    Cancer Other     Diabetes Other     Heart Disease Other     Diabetes Father     Thyroid Disease Father     Thyroid Disease Sister     Diabetes Sister      Social History     Socioeconomic History    Marital status:      Spouse name: None    Number of children: None    Years of education: None    Highest education level: None   Occupational History    None   Tobacco Use    Smoking status: Former Smoker     Packs/day: 0.33     Years: 5.00     Pack years: 1.65     Quit date: 1976     Years since quittin.8    Smokeless tobacco: Never Used   Substance and Sexual Activity    Alcohol use: Yes     Alcohol/week: 0.0 standard drinks     Comment: occasionally    Drug use: No    Sexual activity: Not Currently   Other Topics Concern    None   Social History Narrative    None     Social Determinants of Health     Financial Resource Strain: Low Risk     Difficulty of Paying Living Expenses: Not hard at all   Food Insecurity: No Food Insecurity    Worried About Running Out of Food in the Last Year: Never true    Tobi of Food in the Last Year: Never true   Transportation Needs:     Lack of Transportation (Medical):      Lack of Transportation (Non-Medical):    Physical Activity:     Days of Exercise per Week:     Minutes of Exercise per Session:    Stress:     Feeling of Stress :    Social Connections:     Frequency of Communication with Friends and Family:     Frequency of Social Gatherings with Friends and Family:     Attends Religion Services:     Active Member of Clubs or Organizations:     Attends Club or Organization Meetings:     Marital Status:    Intimate Partner Violence:     Fear of Current or Ex-Partner:     Emotionally Abused:     Physically Abused:     Sexually Abused:      Current Outpatient Medications on File Prior to Visit Medication Sig Dispense Refill    fluticasone (FLONASE) 50 MCG/ACT nasal spray 2 sprays by Nasal route daily 3 Bottle 1    dexlansoprazole (DEXILANT) 60 MG CPDR delayed release capsule Take 1 capsule by mouth daily 90 capsule 1    levothyroxine (SYNTHROID) 75 MCG tablet TAKE 1 TABLET DAILY 90 tablet 1    montelukast (SINGULAIR) 10 MG tablet TAKE 1 TABLET NIGHTLY 90 tablet 1    simvastatin (ZOCOR) 20 MG tablet TAKE 1 TABLET EVERY EVENING 90 tablet 1    azelastine (ASTELIN) 0.1 % nasal spray 2 sprays by Nasal route 2 times daily 2 Spray in each nostril 2 Bottle 0    clobetasol (TEMOVATE) 0.05 % cream Apply topically 2 times daily prn Monday through Friday only, take weekends off 60 Tube 1    albuterol sulfate (PROAIR RESPICLICK) 198 (90 BASE) MCG/ACT aerosol powder inhalation Inhale 2 puffs into the lungs every 4 hours as needed for Wheezing or Shortness of Breath 1 Inhaler 0    magnesium oxide (MAG-OX) 400 MG tablet Take 1 tablet by mouth daily. 30 tablet 6    aspirin 325 MG tablet Take 325 mg by mouth 2 times daily.  cetirizine (ZYRTEC ALLERGY) 10 MG tablet Take 10 mg by mouth nightly.  calcium carbonate (OSCAL) 500 MG TABS tablet Take 500 mg by mouth daily. With D, K ZINC (Patient not taking: Reported on 9/20/2021)       No current facility-administered medications on file prior to visit. Allergies   Allergen Reactions    Peanut (Diagnostic) Anaphylaxis     Asthma    Aspirin Other (See Comments)     Asthma  ASTHMA    Cat Hair Extract     Ceclor [Cefaclor] Hives    Dust Mite Extract     Sulfa Antibiotics Hives    Adhesive Tape Rash       Review of Systems   Constitutional: Positive for fatigue. HENT: Negative. Eyes: Negative. Respiratory: Negative for chest tightness and shortness of breath. Cardiovascular: Negative for chest pain and palpitations. Gastrointestinal: Negative. Endocrine: Negative. Genitourinary: Negative. Musculoskeletal: Negative. Allergic/Immunologic: Negative. Neurological: Negative. Hematological: Negative. Psychiatric/Behavioral: Positive for sleep disturbance. The patient is nervous/anxious. Objective  Vitals:    10/20/21 0804   BP: 120/70   Pulse: 77   SpO2: 98%   Weight: 180 lb (81.6 kg)   Height: 5' 9\" (1.753 m)     Physical Exam  Vitals and nursing note reviewed. Exam conducted with a chaperone present. Constitutional:       Appearance: Normal appearance. She is normal weight. HENT:      Head: Normocephalic. Eyes:      Pupils: Pupils are equal, round, and reactive to light. Cardiovascular:      Rate and Rhythm: Normal rate and regular rhythm. Heart sounds: Normal heart sounds. Pulmonary:      Effort: Pulmonary effort is normal.      Breath sounds: Normal breath sounds. Skin:     General: Skin is warm and dry. Neurological:      General: No focal deficit present. Mental Status: She is alert and oriented to person, place, and time. Mental status is at baseline. Psychiatric:         Mood and Affect: Mood normal.         Behavior: Behavior normal.         Thought Content: Thought content normal.         Judgment: Judgment normal.       Assessment & Plan     Diagnosis Orders   1. Anxiety  hydrOXYzine (VISTARIL) 25 MG capsule    traZODone (DESYREL) 100 MG tablet   2. Insomnia, unspecified type  hydrOXYzine (VISTARIL) 25 MG capsule    traZODone (DESYREL) 100 MG tablet   3. Anxiety and depression  escitalopram (LEXAPRO) 5 MG tablet       No orders of the defined types were placed in this encounter.       Orders Placed This Encounter   Medications    hydrOXYzine (VISTARIL) 25 MG capsule     Sig: Take 1 capsule by mouth nightly as needed for Anxiety     Dispense:  30 capsule     Refill:  2    traZODone (DESYREL) 100 MG tablet     Sig: Take 1 tablet by mouth nightly as needed for Sleep     Dispense:  30 tablet     Refill:  2    escitalopram (LEXAPRO) 5 MG tablet     Sig: Take 1 tablet by mouth daily

## 2021-11-01 ENCOUNTER — TELEPHONE (OUTPATIENT)
Dept: FAMILY MEDICINE CLINIC | Age: 71
End: 2021-11-01

## 2021-11-01 NOTE — TELEPHONE ENCOUNTER
Patient calling regarding   dexilant   Requesting a care exemption. States she has to do this yearly    States this is done through [facility]. [facility] will be faxing over a care reduction form within the next few days. LOV: 10/20/2021  Next Appt: 1/6/20222    Pt states she would like a call once we have received the form.  Ok to lvm

## 2022-01-04 ENCOUNTER — HOSPITAL ENCOUNTER (OUTPATIENT)
Dept: GENERAL RADIOLOGY | Age: 72
Discharge: HOME | End: 2022-01-06
Payer: MEDICARE

## 2022-01-04 ENCOUNTER — OFFICE VISIT (OUTPATIENT)
Dept: FAMILY MEDICINE CLINIC | Age: 72
End: 2022-01-04
Payer: MEDICARE

## 2022-01-04 VITALS
HEART RATE: 68 BPM | HEIGHT: 69 IN | TEMPERATURE: 97.4 F | DIASTOLIC BLOOD PRESSURE: 70 MMHG | WEIGHT: 177 LBS | OXYGEN SATURATION: 97 % | RESPIRATION RATE: 14 BRPM | BODY MASS INDEX: 26.22 KG/M2 | SYSTOLIC BLOOD PRESSURE: 124 MMHG

## 2022-01-04 DIAGNOSIS — S92.351A DISPLACED FRACTURE OF FIFTH METATARSAL BONE, RIGHT FOOT, INITIAL ENCOUNTER FOR CLOSED FRACTURE: ICD-10-CM

## 2022-01-04 DIAGNOSIS — S99.921A FOOT INJURY, RIGHT, INITIAL ENCOUNTER: ICD-10-CM

## 2022-01-04 DIAGNOSIS — S99.921A FOOT INJURY, RIGHT, INITIAL ENCOUNTER: Primary | ICD-10-CM

## 2022-01-04 PROCEDURE — G8427 DOCREV CUR MEDS BY ELIG CLIN: HCPCS | Performed by: NURSE PRACTITIONER

## 2022-01-04 PROCEDURE — 1123F ACP DISCUSS/DSCN MKR DOCD: CPT | Performed by: NURSE PRACTITIONER

## 2022-01-04 PROCEDURE — 3017F COLORECTAL CA SCREEN DOC REV: CPT | Performed by: NURSE PRACTITIONER

## 2022-01-04 PROCEDURE — 73630 X-RAY EXAM OF FOOT: CPT

## 2022-01-04 PROCEDURE — G8417 CALC BMI ABV UP PARAM F/U: HCPCS | Performed by: NURSE PRACTITIONER

## 2022-01-04 PROCEDURE — 4040F PNEUMOC VAC/ADMIN/RCVD: CPT | Performed by: NURSE PRACTITIONER

## 2022-01-04 PROCEDURE — 1036F TOBACCO NON-USER: CPT | Performed by: NURSE PRACTITIONER

## 2022-01-04 PROCEDURE — 99213 OFFICE O/P EST LOW 20 MIN: CPT | Performed by: NURSE PRACTITIONER

## 2022-01-04 PROCEDURE — 1090F PRES/ABSN URINE INCON ASSESS: CPT | Performed by: NURSE PRACTITIONER

## 2022-01-04 PROCEDURE — G8399 PT W/DXA RESULTS DOCUMENT: HCPCS | Performed by: NURSE PRACTITIONER

## 2022-01-04 PROCEDURE — G8484 FLU IMMUNIZE NO ADMIN: HCPCS | Performed by: NURSE PRACTITIONER

## 2022-01-04 NOTE — PATIENT INSTRUCTIONS
Patient Education        Broken Foot: Care Instructions  Your Care Instructions     A broken foot, or foot fracture, is a break in one or more of the bones in your foot. It may happen because of a sports injury, a fall, or other accident. A compound, or open, fracture occurs when a bone breaks through the skin. A break that does not poke through the skin is a closed fracture. Your treatment depends on the location and type of break in your foot. You may need a splint, a cast, or an orthopedic shoe. Certain kinds of injuries may need surgery at some time. Whatever your treatment, you can ease symptoms and help your foot heal with care at home. You may need 6 to 8 weeks or more to fully heal.  You heal best when you take good care of yourself. Eat a variety of healthy foods, and don't smoke. Follow-up care is a key part of your treatment and safety. Be sure to make and go to all appointments, and call your doctor if you are having problems. It's also a good idea to know your test results and keep a list of the medicines you take. How can you care for yourself at home? · Be safe with medicines. Take pain medicines exactly as directed. ? If the doctor gave you a prescription medicine for pain, take it as prescribed. ? If you are not taking a prescription pain medicine, ask your doctor if you can take an over-the-counter medicine. · Leave the splint on until your follow-up appointment. Do not put any weight on the injured foot. If you were given crutches, use them as directed. · Put ice or a cold pack on your foot for 10 to 20 minutes at a time. Try to do this every 1 to 2 hours for the next 3 days (when you are awake) or until the swelling goes down. Put a thin cloth between the ice and your skin. · Prop up the sore foot on a pillow anytime you sit or lie down during the next 3 days. Try to keep it above the level of your heart. This will help reduce swelling.   · Follow the cast care instructions your doctor gives you. If you have a splint, do not take it off unless your doctor tells you to. Cast and splint care  · If you have a removable splint, ask your doctor if it is okay to remove it to bathe. Your doctor may want you to keep it on as much as possible. · Keep your plaster splint covered by taping a sheet of plastic around it when you bathe. Water under the plaster can cause your skin to itch and hurt. · Never cut your splint. When should you call for help? Call 911 anytime you think you may need emergency care. For example, call if:    · You have chest pain, are short of breath, or you cough up blood. Call your doctor now or seek immediate medical care if:    · You have new or worse pain.     · Your foot is cool or pale or changes color.     · You have tingling, weakness, or numbness in your toes.     · Your cast or splint feels too tight.     · You have signs of a blood clot in your leg (called a deep vein thrombosis), such as:  ? Pain in your calf, back of the knee, thigh, or groin. ? Redness or swelling in your leg. Watch closely for changes in your health, and be sure to contact your doctor if:    · You have a problem with your splint or cast.     · You do not get better as expected. Where can you learn more? Go to https://El TeatropeOptaHEALTH.Shopnlist. org and sign in to your CloudMine account. Enter Y574 in the [facility] box to learn more about \"Broken Foot: Care Instructions. \"     If you do not have an account, please click on the \"Sign Up Now\" link. Current as of: July 1, 2021               Content Version: 13.1  © 8664-1726 Healthwise, [employer]orporated. Care instructions adapted under license by [facility] ([facility]). If you have questions about a medical condition or this instruction, always ask your healthcare professional. Leona Woody any warranty or liability for your use of this information.          Patient Education        Foot Pain: Care Instructions  Your Care Instructions     Foot injuries that cause pain and swelling are fairly common. Almost all sports or home repair projects can cause a misstep that ends up as foot pain. Normal wear and tear, especially as you get older, also can cause foot pain. Most minor foot injuries will heal on their own, and home treatment is usually all you need to do. If you have a severe injury, you may need tests and treatment. Follow-up care is a key part of your treatment and safety. Be sure to make and go to all appointments, and call your doctor if you are having problems. It's also a good idea to know your test results and keep a list of the medicines you take. How can you care for yourself at home? · Take pain medicines exactly as directed. ? If the doctor gave you a prescription medicine for pain, take it as prescribed. ? If you are not taking a prescription pain medicine, ask your doctor if you can take an over-the-counter medicine. · Rest and protect your foot. Take a break from any activity that may cause pain. · Put ice or a cold pack on your foot for 10 to 20 minutes at a time. Put a thin cloth between the ice and your skin. · Prop up the sore foot on a pillow when you ice it or anytime you sit or lie down during the next 3 days. Try to keep it above the level of your heart. This will help reduce swelling. · Your doctor may recommend that you wrap your foot with an elastic bandage. Keep your foot wrapped for as long as your doctor advises. · If your doctor recommends crutches, use them as directed. · Wear roomy footwear. · As soon as pain and swelling end, begin gentle exercises of your foot. Your doctor can tell you which exercises will help. When should you call for help? Call 911 anytime you think you may need emergency care. For example, call if:    · Your foot turns pale, white, blue, or cold.    Call your doctor now or seek immediate medical care if:    · You cannot move or stand on your foot.     · Your foot looks twisted or out of its normal position.     · Your foot is not stable when you step down.     · You have signs of infection, such as:  ? Increased pain, swelling, warmth, or redness. ? Red streaks leading from the sore area. ? Pus draining from a place on your foot. ? A fever.     · Your foot is numb or tingly. Watch closely for changes in your health, and be sure to contact your doctor if:    · You do not get better as expected.     · You have bruises from an injury that last longer than 2 weeks. Where can you learn more? Go to https://Dana-Far[facility] Cancer Institutepepiceweb.Shapeways. org and sign in to your myseekit account. Enter F351 in the [employer] box to learn more about \"Foot Pain: Care Instructions. \"     If you do not have an account, please click on the \"Sign Up Now\" link. Current as of: July 1, 2021               Content Version: 13.1  © 2006-2021 [employer]. Care instructions adapted under license by [facility] [employer] [facility] ([facility]). If you have questions about a medical condition or this instruction, always ask your healthcare professional. [place] [address] any warranty or liability for your use of this information. Patient Education        Foot Sprain: Care Instructions  Your Care Instructions     A foot sprain occurs when you stretch or tear the ligaments around your foot. Ligaments are the tough tissues that connect one bone to another. A sprain can happen when you run, fall, or hit your toe against something. Sprains often happen when you jump or change direction quickly. This may occur when you play basketball, soccer, or other sports. Most foot sprains will get better with treatment at home. Follow-up care is a key part of your treatment and safety. Be sure to make and go to all appointments, and call your doctor if you are having problems. It's also a good idea to know your test results and keep a list of the medicines you take.   How can you care for yourself at home? · Walk or put weight on your sprained foot as long as it does not hurt. · If your doctor gave you a splint or immobilizer, wear it as directed. If you were given crutches, use them as directed. · For the first 2 days after your injury, avoid hot showers, hot tubs, or hot packs. They may increase swelling. · Put ice or a cold pack on your foot for 10 to 20 minutes at a time to stop swelling. Try this every 1 to 2 hours for 3 days (when you are awake) or until the swelling goes down. Put a thin cloth between the ice pack and your skin. Keep your splint dry. · After 2 or 3 days, if your swelling is gone, put a heating pad (set on low) or a warm cloth on your foot. Some doctors suggest that you go back and forth between hot and cold treatments. · Prop up your foot on a pillow when you ice it or anytime you sit or lie down. Try to keep it above the level of your heart. This will help reduce swelling. · Take pain medicines exactly as directed. ? If the doctor gave you a prescription medicine for pain, take it as prescribed. ? If you are not taking a prescription pain medicine, ask your doctor if you can take an over-the-counter medicine. · Do any exercises that your doctor or physical therapist suggests. · Return to your usual exercise gradually as you feel better. When should you call for help? Call your doctor now or seek immediate medical care if:    · You have increased or severe pain.     · Your toes are cool or pale or change color.     · Your wrap or splint feels too tight.     · You have signs of a blood clot, such as:  ? Pain in your calf, back of the knee, thigh, or groin. ? Redness and swelling in your leg or groin.     · You have tingling, weakness, or numbness in your leg or foot. Watch closely for changes in your health, and be sure to contact your doctor if:    · You cannot put any weight on your foot.     · You get a fever.     · You do not get better as expected.    Where can you learn more? Go to https://chpepiceweb.Drive. org and sign in to your [employer] Networks account. Enter Y058 in the [facility] box to learn more about \"Foot Sprain: Care Instructions. \"     If you do not have an account, please click on the \"Sign Up Now\" link. Current as of: July 1, 2021               Content Version: 13.1  © 2006-2021 [employer]. Care instructions adapted under license by [facility] ([facility]). If you have questions about a medical condition or this instruction, always ask your healthcare professional. [place] [address] any warranty or liability for your use of this information. Patient Education        Learning About RICE (Rest, Ice, Compression, and Elevation)  What is RICE? RICE is a way to care for an injury. RICE helps relieve pain and swelling. It may also help with healing and flexibility. RICE stands for:  · R est and protect the injured or sore area. · I ce or a cold pack used as soon as possible. · C ompression, or wrapping the injured or sore area with an elastic bandage. · E levation (propping up) the injured or sore area. How do you do RICE? You can use RICE for home treatment when you have general aches and pains or after an injury or surgery. Rest  · Do not put weight on the injury for at least 24 to 48 hours. · Use crutches for a badly sprained knee or ankle. · Support a sprained wrist, elbow, or shoulder with a sling. Ice  · Put ice or a cold pack on the injury right away to reduce pain and swelling. Frozen vegetables will also work as an ice pack. Put a thin cloth between the ice or cold pack and your skin. The cloth protects the injured area from getting too cold. · Use ice for 10 to 15 minutes at a time for the first 48 to 72 hours. Compression  · Use compression for sprains, strains, and surgeries of the arms and legs. · Wrap the injured area with an elastic bandage or compression sleeve to reduce swelling.   · Don't wrap it too tightly. If the area below it feels numb, tingles, or feels cool, loosen the wrap. Elevation  · Use elevation for areas of the body that can be propped up, such as arms and legs. · Prop up the injured area on pillows whenever you use ice. Keep it propped up anytime you sit or lie down. · Try to keep the injured area at or above the level of your heart. This will help reduce swelling and bruising. Where can you learn more? Go to https://Jobinasecondpepopeyeeb.Showpad. org and sign in to your AutoeBid account. Enter B844 in the [employer] box to learn more about \"Learning About RICE (Rest, Ice, Compression, and Elevation). \"     If you do not have an account, please click on the \"Sign Up Now\" link. Current as of: July 1, 2021               Content Version: 13.1  © 3596-5569 Healthwise, [facility]. Care instructions adapted under license by [facility] ([facility]). If you have questions about a medical condition or this instruction, always ask your healthcare professional. [place] [address] any warranty or liability for your use of this information.

## 2022-01-04 NOTE — PROGRESS NOTES
Subjective:      Patient ID: Hunter Herr is a 80 y.o. female who presents today for:  Chief Complaint   Patient presents with    Fall     right foot and  ankle pain ,patient heard a popping noise       HPI    This happened about 4 hours ago   She lost her step trying to calli a bag   She can push the foot back and forth like to push the gas in the car without difficulty  Rolled the right foot to the side   Bruised and a little swellling   It hurts when she tries to rotate the foot       Past Medical History:   Diagnosis Date    Allergic rhinitis     Asthma     Dizziness     Dr. Buford Holstein DJD (degenerative joint disease)     Eczema     GERD (gastroesophageal reflux disease)     Hashimoto's disease     History of chronic sinusitis     Hx of colonic polyps     BENIGN    Hx of thyroid nodule     biospy benign    Hyperlipidemia     Lumbago     Mild tricuspid regurgitation 2014    echo    Normal cardiac stress test 2014    POTS (postural orthostatic tachycardia syndrome)     Dr. Buford Holstein Prediabetes     Trigger finger of right hand     4th and 5th digits     Past Surgical History:   Procedure Laterality Date    BREAST SURGERY  -LEFT    FIBROID TUMOR LUMPECTOMY     SECTION      COLONOSCOPY  ,,    GUM SURGERY      GUM IMPLANT    MOUTH SURGERY  -POLYP    NASAL POLYP SURGERY  1986    KILLIAN-YULIYA POLYPS AND WINDOWS, TURBINATES TRIMMED    NASAL POLYP SURGERY  FESS BILAT FISTULA REPAIR    SINUS SURGERY      TONSILLECTOMY AND ADENOIDECTOMY       Social History     Socioeconomic History    Marital status:      Spouse name: Not on file    Number of children: Not on file    Years of education: Not on file    Highest education level: Not on file   Occupational History    Not on file   Tobacco Use    Smoking status: Former Smoker     Packs/day: 0.33     Years: 5.00     Pack years: 1.65     Quit date: 1976     Years since quittin.0    Smokeless tobacco: Never Used   Substance and Sexual Activity    Alcohol use: Yes     Alcohol/week: 0.0 standard drinks     Comment: occasionally    Drug use: No    Sexual activity: Not Currently   Other Topics Concern    Not on file   Social History Narrative    Not on file     Social Determinants of Health     Financial Resource Strain:     Difficulty of Paying Living Expenses: Not on file   Food Insecurity: No Food Insecurity    Worried About Running Out of Food in the Last Year: Never true    Tobi of Food in the Last Year: Never true   Transportation Needs:     Lack of Transportation (Medical): Not on file    Lack of Transportation (Non-Medical):  Not on file   Physical Activity:     Days of Exercise per Week: Not on file    Minutes of Exercise per Session: Not on file   Stress:     Feeling of Stress : Not on file   Social Connections:     Frequency of Communication with Friends and Family: Not on file    Frequency of Social Gatherings with Friends and Family: Not on file    Attends [employer] Services: Not on file    Active Member of Clubs or Organizations: Not on file    Attends Club or Organization Meetings: Not on file    Marital Status: Not on file   Intimate Partner Violence:     Fear of Current or Ex-Partner: Not on file    Emotionally Abused: Not on file    Physically Abused: Not on file    Sexually Abused: Not on file   Housing Stability:     Unable to Pay for Housing in the Last Year: Not on file    Number of [place] in the Last Year: Not on file    Unstable Housing in the Last Year: Not on file     Family History   Problem Relation Age of Onset    Cancer Other     Diabetes Other     Heart Disease Other     Diabetes Father     Thyroid Disease Father     Thyroid Disease Sister     Diabetes Sister      Allergies   Allergen Reactions    Peanut (Diagnostic) Anaphylaxis     Asthma    Aspirin Other (See Comments)     Asthma  ASTHMA    Cat Hair Extract     Celso diarrhea, nausea and vomiting. Musculoskeletal: Positive for arthralgias. Negative for gait problem and myalgias. Skin: Negative for rash. Neurological: Negative for dizziness, light-headedness and headaches. Psychiatric/Behavioral: Negative for agitation, confusion and hallucinations. Objective:   /70   Pulse 68   Temp 97.4 °F (36.3 °C)   Resp 14   Ht 5' 9\" (1.753 m)   Wt 177 lb (80.3 kg)   LMP 12/13/2001   SpO2 97%   BMI 26.14 kg/m²     Physical Exam  Vitals reviewed. Constitutional:       Appearance: Normal appearance. She is normal weight. HENT:      Head: Normocephalic and atraumatic. Nose: Nose normal.      Mouth/Throat:      Lips: Pink. Eyes:      General: Lids are normal.      Conjunctiva/sclera: Conjunctivae normal.      Pupils: Pupils are equal, round, and reactive to light. Cardiovascular:      Rate and Rhythm: Normal rate. Pulses:           Dorsalis pedis pulses are 2+ on the right side. Pulmonary:      Effort: Pulmonary effort is normal.   Musculoskeletal:         General: Swelling and tenderness present. Cervical back: Normal range of motion. Right foot: Normal range of motion. Feet:    Feet:      Comments: brusing and small swelling     Skin:     General: Skin is warm and dry. Neurological:      General: No focal deficit present. Mental Status: She is alert and oriented to person, place, and time. Psychiatric:         Mood and Affect: Mood normal.         Behavior: Behavior normal. Behavior is cooperative. Assessment:       Diagnosis Orders   1. Foot injury, right, initial encounter  XR FOOT RIGHT (MIN 3 VIEWS)    [facility] Ahumada, DPM, Podiatry, [place]   2. Displaced fracture of fifth metatarsal bone, right foot, initial encounter for closed fracture  Carl Moore DPM, Podiatry, [place]     No results found for this visit on 01/04/22.    Plan:     Assessment & Plan   Rd Lo was seen today for fall.    Diagnoses and all orders for this visit:    Foot injury, right, initial encounter  -     Cancel: XR FOOT RIGHT (MIN 3 VIEWS); Future  -     XR FOOT RIGHT (MIN 3 VIEWS); Future  -     Willow Modi DPM, Podiatry, [place]    Displaced fracture of fifth metatarsal bone, right foot, initial encounter for closed fracture  -     Willow Modi DPM, Podiatry, [place]      Orders Placed This Encounter   Procedures    XR FOOT RIGHT (MIN 3 VIEWS)     Standing Status:   Future     Number of Occurrences:   1     Standing Expiration Date:   1/4/2023     Order Specific Question:   Reason for exam:     Answer:   overstepped and turn the foot onto the side, she heard a pop sound, there is brusing and small swlling lateral side midfoot   24 Regino Hansen DPM, Podiatry, [place]     Referral Priority:   Urgent     Referral Type:   Eval and Treat     Referral Reason:   Specialty Services Required     Referred to Provider:   Flor Beard DPM     Requested Specialty:   Podiatry     Number of Visits Requested:   1     No orders of the defined types were placed in this encounter. There are no discontinued medications. Return if symptoms worsen or fail to improve. Reviewed with the patient/family: current clinical status & medications. Side effects of the medication prescribed today, as well as treatment plan/rationale and result expectations have been discussed with the patient/family who expresses understanding. Patient will be discharged home in stable condition. Follow up with PCP to evaluate treatment results or return if symptoms worsen or fail to improve. Discussed signs and symptoms which require immediate follow-up in ED/call to 911. Understanding verbalized. I have reviewed the patient's medical history in detail and updated the computerized patient record.     Novella Pallas, APRN - CNP

## 2022-01-05 ASSESSMENT — ENCOUNTER SYMPTOMS
WHEEZING: 0
DIARRHEA: 0
SHORTNESS OF BREATH: 0
NAUSEA: 0
COUGH: 0
VOMITING: 0
RHINORRHEA: 0
SORE THROAT: 0

## 2022-01-06 ENCOUNTER — OFFICE VISIT (OUTPATIENT)
Dept: FAMILY MEDICINE CLINIC | Age: 72
End: 2022-01-06
Payer: MEDICARE

## 2022-01-06 VITALS
OXYGEN SATURATION: 98 % | HEIGHT: 69 IN | SYSTOLIC BLOOD PRESSURE: 136 MMHG | TEMPERATURE: 97.8 F | BODY MASS INDEX: 25.92 KG/M2 | DIASTOLIC BLOOD PRESSURE: 70 MMHG | HEART RATE: 80 BPM | WEIGHT: 175 LBS

## 2022-01-06 DIAGNOSIS — L30.9 ECZEMA, UNSPECIFIED TYPE: ICD-10-CM

## 2022-01-06 DIAGNOSIS — F32.A ANXIETY AND DEPRESSION: ICD-10-CM

## 2022-01-06 DIAGNOSIS — Z00.00 ROUTINE GENERAL MEDICAL EXAMINATION AT A HEALTH CARE FACILITY: Primary | ICD-10-CM

## 2022-01-06 DIAGNOSIS — G47.00 INSOMNIA, UNSPECIFIED TYPE: ICD-10-CM

## 2022-01-06 DIAGNOSIS — E03.9 HYPOTHYROIDISM, UNSPECIFIED TYPE: ICD-10-CM

## 2022-01-06 DIAGNOSIS — E78.5 HYPERLIPIDEMIA, UNSPECIFIED HYPERLIPIDEMIA TYPE: ICD-10-CM

## 2022-01-06 DIAGNOSIS — F41.9 ANXIETY AND DEPRESSION: ICD-10-CM

## 2022-01-06 DIAGNOSIS — G89.29 CHRONIC LEFT SHOULDER PAIN: ICD-10-CM

## 2022-01-06 DIAGNOSIS — M25.512 CHRONIC LEFT SHOULDER PAIN: ICD-10-CM

## 2022-01-06 DIAGNOSIS — F41.9 ANXIETY: ICD-10-CM

## 2022-01-06 DIAGNOSIS — J30.9 ALLERGIC RHINITIS, UNSPECIFIED SEASONALITY, UNSPECIFIED TRIGGER: ICD-10-CM

## 2022-01-06 PROCEDURE — 3017F COLORECTAL CA SCREEN DOC REV: CPT | Performed by: NURSE PRACTITIONER

## 2022-01-06 PROCEDURE — G8484 FLU IMMUNIZE NO ADMIN: HCPCS | Performed by: NURSE PRACTITIONER

## 2022-01-06 PROCEDURE — 99213 OFFICE O/P EST LOW 20 MIN: CPT | Performed by: NURSE PRACTITIONER

## 2022-01-06 PROCEDURE — 4040F PNEUMOC VAC/ADMIN/RCVD: CPT | Performed by: NURSE PRACTITIONER

## 2022-01-06 PROCEDURE — 1123F ACP DISCUSS/DSCN MKR DOCD: CPT | Performed by: NURSE PRACTITIONER

## 2022-01-06 PROCEDURE — G0439 PPPS, SUBSEQ VISIT: HCPCS | Performed by: NURSE PRACTITIONER

## 2022-01-06 RX ORDER — ESCITALOPRAM OXALATE 5 MG/1
5 TABLET ORAL DAILY
Qty: 90 TABLET | Refills: 1 | Status: SHIPPED | OUTPATIENT
Start: 2022-01-06 | End: 2022-06-13

## 2022-01-06 RX ORDER — SIMVASTATIN 20 MG
TABLET ORAL
Qty: 90 TABLET | Refills: 1 | Status: SHIPPED | OUTPATIENT
Start: 2022-01-06 | End: 2022-06-13

## 2022-01-06 RX ORDER — LEVOTHYROXINE SODIUM 0.07 MG/1
TABLET ORAL
Qty: 90 TABLET | Refills: 1 | Status: SHIPPED | OUTPATIENT
Start: 2022-01-06 | End: 2022-06-13

## 2022-01-06 RX ORDER — MONTELUKAST SODIUM 10 MG/1
TABLET ORAL
Qty: 90 TABLET | Refills: 1 | Status: SHIPPED | OUTPATIENT
Start: 2022-01-06 | End: 2022-06-13

## 2022-01-06 RX ORDER — FLUTICASONE PROPIONATE 50 MCG
2 SPRAY, SUSPENSION (ML) NASAL DAILY
Qty: 3 EACH | Refills: 1 | Status: SHIPPED | OUTPATIENT
Start: 2022-01-06 | End: 2022-01-23 | Stop reason: SDUPTHER

## 2022-01-06 RX ORDER — DEXLANSOPRAZOLE 60 MG/1
60 CAPSULE, DELAYED RELEASE ORAL DAILY
Qty: 90 CAPSULE | Refills: 1 | Status: SHIPPED | OUTPATIENT
Start: 2022-01-06 | End: 2022-06-14 | Stop reason: SDUPTHER

## 2022-01-06 RX ORDER — TRAZODONE HYDROCHLORIDE 50 MG/1
50 TABLET ORAL NIGHTLY
Qty: 30 TABLET | Refills: 2 | Status: SHIPPED | OUTPATIENT
Start: 2022-01-06 | End: 2022-07-06 | Stop reason: CLARIF

## 2022-01-06 RX ORDER — CLOBETASOL PROPIONATE 0.5 MG/G
CREAM TOPICAL
Qty: 60 G | Refills: 2 | Status: SHIPPED | OUTPATIENT
Start: 2022-01-06

## 2022-01-06 SDOH — ECONOMIC STABILITY: FOOD INSECURITY: WITHIN THE PAST 12 MONTHS, YOU WORRIED THAT YOUR FOOD WOULD RUN OUT BEFORE YOU GOT MONEY TO BUY MORE.: NEVER TRUE

## 2022-01-06 SDOH — ECONOMIC STABILITY: FOOD INSECURITY: WITHIN THE PAST 12 MONTHS, THE FOOD YOU BOUGHT JUST DIDN'T LAST AND YOU DIDN'T HAVE MONEY TO GET MORE.: NEVER TRUE

## 2022-01-06 ASSESSMENT — PATIENT HEALTH QUESTIONNAIRE - PHQ9
2. FEELING DOWN, DEPRESSED OR HOPELESS: 0
1. LITTLE INTEREST OR PLEASURE IN DOING THINGS: 1
SUM OF ALL RESPONSES TO PHQ QUESTIONS 1-9: 2
SUM OF ALL RESPONSES TO PHQ QUESTIONS 1-9: 0
SUM OF ALL RESPONSES TO PHQ9 QUESTIONS 1 & 2: 0
SUM OF ALL RESPONSES TO PHQ QUESTIONS 1-9: 0
SUM OF ALL RESPONSES TO PHQ QUESTIONS 1-9: 2
SUM OF ALL RESPONSES TO PHQ QUESTIONS 1-9: 0
2. FEELING DOWN, DEPRESSED OR HOPELESS: 0
SUM OF ALL RESPONSES TO PHQ QUESTIONS 1-9: 0
SUM OF ALL RESPONSES TO PHQ QUESTIONS 1-9: 0
SUM OF ALL RESPONSES TO PHQ QUESTIONS 1-9: 2
1. LITTLE INTEREST OR PLEASURE IN DOING THINGS: 0
SUM OF ALL RESPONSES TO PHQ QUESTIONS 1-9: 0
SUM OF ALL RESPONSES TO PHQ9 QUESTIONS 1 & 2: 2
SUM OF ALL RESPONSES TO PHQ QUESTIONS 1-9: 0
2. FEELING DOWN, DEPRESSED OR HOPELESS: 1
SUM OF ALL RESPONSES TO PHQ QUESTIONS 1-9: 0
1. LITTLE INTEREST OR PLEASURE IN DOING THINGS: 0
SUM OF ALL RESPONSES TO PHQ9 QUESTIONS 1 & 2: 0
SUM OF ALL RESPONSES TO PHQ QUESTIONS 1-9: 2

## 2022-01-06 ASSESSMENT — SOCIAL DETERMINANTS OF HEALTH (SDOH): HOW HARD IS IT FOR YOU TO PAY FOR THE VERY BASICS LIKE FOOD, HOUSING, MEDICAL CARE, AND HEATING?: NOT HARD AT ALL

## 2022-01-06 NOTE — PROGRESS NOTES
Medicare Annual Wellness Visit  Name: Celestina Lambert Date: 2022   MRN: [id number] Sex: Female   Age: 80 y.o. Ethnicity: Non- / Non    : 1950 Race: White (non-)      Darlene Machado is here for Medicare AWV    Screenings for behavioral, psychosocial and functional/safety risks, and cognitive dysfunction are all negative except as indicated below. These results, as well as other patient data from the [address] form, are documented in Flowsheets linked to this Encounter. Fractured foot recently. Now in boot. Saw center for orthopedics. Walking with cane for stability. Has hole in macula that she has been following with eye dr. Mathew Gloria with GI every 3 years for precancerous polyps    occasional break through with GERD    lexapro helping with anxiety. Trazodone helps with sleep. Too strong. Would like lower dose    Left shoulder still bothering her. Mild decrease in ROM. Asking about potentially doing PT? Allergies   Allergen Reactions    Peanut (Diagnostic) Anaphylaxis     Asthma    Aspirin Other (See Comments)     Asthma  ASTHMA    Cat Hair Extract     Ceclor [Cefaclor] Hives    Dust Mite Extract     Sulfa Antibiotics Hives    Adhesive Tape Rash         Prior to Visit Medications    Medication Sig Taking?  Authorizing Provider   traZODone (DESYREL) 50 MG tablet Take 1 tablet by mouth nightly Yes JENN Sullivan CNP   clobetasol (TEMOVATE) 0.05 % cream Apply topically 2 times daily prn Monday through Friday only, take weekends off Yes JENN Sullivan CNP   simvastatin (ZOCOR) 20 MG tablet TAKE 1 TABLET EVERY EVENING Yes JENN Sullivan CNP   montelukast (SINGULAIR) 10 MG tablet TAKE 1 TABLET NIGHTLY Yes JENN Sullivan CNP   levothyroxine (SYNTHROID) 75 MCG tablet TAKE 1 TABLET DAILY Yes JENN Sullivan CNP   dexlansoprazole (DEXILANT) 60 MG CPDR delayed release capsule Take 1 capsule by mouth daily Yes Oral [place], APRN - CNP   escitalopram (LEXAPRO) 5 MG tablet Take 1 tablet by mouth daily Yes Oral [place], APRN - CNP   fluticasone (FLONASE) 50 MCG/ACT nasal spray 2 sprays by Nasal route daily Yes Oral [place], APRN - CNP   hydrOXYzine (VISTARIL) 25 MG capsule Take 1 capsule by mouth nightly as needed for Anxiety Yes Oral [place], APRN - CNP   azelastine (ASTELIN) 0.1 % nasal spray 2 sprays by Nasal route 2 times daily 2 Spray in each nostril Yes Asmita Guaman, APRN - CNP   albuterol sulfate (PROAIR RESPICLICK) 288 (90 BASE) MCG/ACT aerosol powder inhalation Inhale 2 puffs into the lungs every 4 hours as needed for Wheezing or Shortness of Breath Yes Oral [place], APRN - CNP   magnesium oxide (MAG-OX) 400 MG tablet Take 1 tablet by mouth daily. Yes Everton J Holiday, DO   aspirin 325 MG tablet Take 325 mg by mouth 2 times daily. Yes Historical Provider, MD   cetirizine (ZYRTEC ALLERGY) 10 MG tablet Take 10 mg by mouth nightly.    Yes Historical Provider, MD   calcium carbonate (OSCAL) 500 MG TABS tablet Take 500 mg by mouth daily With D, K ZINC Yes Historical Provider, MD         Past Medical History:   Diagnosis Date    Allergic rhinitis     Asthma     Dizziness     Dr. Sharita Woody    DJD (degenerative joint disease)     Eczema     GERD (gastroesophageal reflux disease)     Hashimoto's disease     History of chronic sinusitis     Hx of colonic polyps     BENIGN    Hx of thyroid nodule     biospy benign    Hyperlipidemia     Lumbago     Mild tricuspid regurgitation 2014    echo    Normal cardiac stress test 2014    POTS (postural orthostatic tachycardia syndrome)     Dr. Rd Ramirez Prediabetes     Trigger finger of right hand     4th and 5th digits       Past Surgical History:   Procedure Laterality Date    BREAST SURGERY  -LEFT    FIBROID TUMOR LUMPECTOMY     SECTION      COLONOSCOPY  ,,2010    GUM SURGERY      GUM IMPLANT    MOUTH SURGERY  -POLYP    NASAL POLYP SURGERY  1986    KILLIAN-YULIYA POLYPS AND WINDOWS, TURBINATES TRIMMED    NASAL POLYP SURGERY  FESS BILAT FISTULA REPAIR    SINUS SURGERY      TONSILLECTOMY AND ADENOIDECTOMY  1961         Family History   Problem Relation Age of Onset    Cancer Other     Diabetes Other     Heart Disease Other     Diabetes Father     Thyroid Disease Father     Thyroid Disease Sister     Diabetes Sister        CareTeam (Including outside providers/suppliers regularly involved in providing care):   Patient Care Team:  JENN Horne CNP as PCP - General (Family Nurse Practitioner)  JENN Horne CNP as PCP - [facility]    Wt Readings from Last 3 Encounters:   01/06/22 175 lb (79.4 kg)   01/04/22 177 lb (80.3 kg)   10/20/21 180 lb (81.6 kg)     Vitals:    01/06/22 1028   BP: 136/70   Pulse: 80   Temp: 97.8 °F (36.6 °C)   SpO2: 98%   Weight: 175 lb (79.4 kg)   Height: 5' 9\" (1.753 m)     Body mass index is 25.84 kg/m². Based upon direct observation of the patient, evaluation of cognition reveals .   recent and remote memory intact    General Appearance: alert and oriented to person, place and time, well developed and well- nourished, in no acute distress  Skin: warm and dry, no rash or erythema  Head: normocephalic and atraumatic  Eyes: pupils equal, round, and reactive to light, extraocular eye movements intact, conjunctivae normal  ENT: tympanic membrane, external ear and ear canal normal bilaterally, nose without deformity, nasal mucosa and turbinates normal without polyps  Neck: supple and non-tender without mass, no thyromegaly or thyroid nodules, no cervical lymphadenopathy  Pulmonary/Chest: clear to auscultation bilaterally- no wheezes, rales or rhonchi, normal air movement, no respiratory distress  Cardiovascular: normal rate, regular rhythm, normal S1 and S2, no murmurs, rubs, clicks, or gallops, distal pulses intact, no carotid bruits  Abdomen: soft, non-tender, non-distended, normal bowel sounds, no masses or organomegaly  Extremities: no cyanosis, clubbing or edema  Musculoskeletal: normal range of motion, no joint swelling, deformity or tenderness  Neurologic: reflexes normal and symmetric, no cranial nerve deficit, gait, coordination and speech normal    Patient's complete Health Risk Assessment and screening values have been reviewed and are found in Flowsheets. The following problems were reviewed today and where indicated follow up appointments were made and/or referrals ordered. Positive Risk Factor Screenings with Interventions:              General Health and ACP:  General  In general, how would you say your health is?: Good  In the past 7 days, have you experienced any of the following?  New or Increased Pain, New or Increased Fatigue, Loneliness, Social Isolation, Stress or Anger?: (!) New or Increased Fatigue  Do you get the social and emotional support that you need?: Yes  Do you have a Living Will?: Yes  Advance Directives     Power of  Living Will ACP-Advance Directive ACP-Power of     Not on File Not on File Not on File Not on File      General Health Risk Interventions:  · Fatigue: patient declines any further evaluation/treatment for this issue     Hearing/Vision:  No exam data present  Hearing/Vision  Do you or your family notice any trouble with your hearing that hasn't been managed with hearing aids?: (!) Yes  Do you have difficulty driving, watching TV, or doing any of your daily activities because of your eyesight?: (!) Yes  Have you had an eye exam within the past year?: Yes  Hearing/Vision Interventions:  · Hearing concerns:  following with ear   · Vision concerns:  patient encouraged to make appointment with his/her eye specialist        Personalized Preventive Plan   Current Health Maintenance Status  Immunization History   Administered Date(s) Administered    COVID-19, [place], PF, 30mcg/0.3mL 02/10/2021, 03/03/2021, 09/25/2021    patient in written form: see Patient Catalina Lin was seen today for medicare awv. Diagnoses and all orders for this visit:    Routine general medical examination at a health care facility    Insomnia, unspecified type  -     traZODone (DESYREL) 50 MG tablet; Take 1 tablet by mouth nightly    Hyperlipidemia, unspecified hyperlipidemia type  -     simvastatin (ZOCOR) 20 MG tablet; TAKE 1 TABLET EVERY EVENING    Hypothyroidism, unspecified type  -     levothyroxine (SYNTHROID) 75 MCG tablet; TAKE 1 TABLET DAILY    Anxiety    Eczema, unspecified type  -     clobetasol (TEMOVATE) 0.05 % cream; Apply topically 2 times daily prn Monday through Friday only, take weekends off    Chronic left shoulder pain  -     [facility] - [place]    Allergic rhinitis, unspecified seasonality, unspecified trigger  -     montelukast (SINGULAIR) 10 MG tablet; TAKE 1 TABLET NIGHTLY  -     fluticasone (FLONASE) 50 MCG/ACT nasal spray; 2 sprays by Nasal route daily    Anxiety and depression  -     escitalopram (LEXAPRO) 5 MG tablet; Take 1 tablet by mouth daily    Other orders  -     dexlansoprazole (DEXILANT) 60 MG CPDR delayed release capsule; Take 1 capsule by mouth daily                Would like to do mammogram in July (covid slowing down)  Dexa within the next year.

## 2022-01-12 ENCOUNTER — HOSPITAL ENCOUNTER (OUTPATIENT)
Dept: PHYSICAL THERAPY | Age: 72
Setting detail: THERAPIES SERIES
Discharge: HOME | End: 2022-01-12
Payer: MEDICARE

## 2022-01-12 PROCEDURE — 97110 THERAPEUTIC EXERCISES: CPT

## 2022-01-12 PROCEDURE — 97161 PT EVAL LOW COMPLEX 20 MIN: CPT

## 2022-01-12 ASSESSMENT — PAIN SCALES - GENERAL: PAINLEVEL_OUTOF10: 1

## 2022-01-12 ASSESSMENT — PAIN DESCRIPTION - FREQUENCY: FREQUENCY: INTERMITTENT

## 2022-01-12 ASSESSMENT — PAIN - FUNCTIONAL ASSESSMENT: PAIN_FUNCTIONAL_ASSESSMENT: PREVENTS OR INTERFERES WITH MANY ACTIVE NOT PASSIVE ACTIVITIES

## 2022-01-12 ASSESSMENT — PAIN DESCRIPTION - ORIENTATION: ORIENTATION: LEFT;ANTERIOR

## 2022-01-12 ASSESSMENT — PAIN DESCRIPTION - PAIN TYPE: TYPE: CHRONIC PAIN

## 2022-01-12 ASSESSMENT — PAIN DESCRIPTION - DESCRIPTORS: DESCRIPTORS: ACHING

## 2022-01-12 ASSESSMENT — PAIN DESCRIPTION - LOCATION: LOCATION: SHOULDER

## 2022-01-12 ASSESSMENT — PAIN DESCRIPTION - ONSET: ONSET: ON-GOING

## 2022-01-12 NOTE — PROGRESS NOTES
[address]     Ph: [phone]  Fax: [phone]    [] Certification  [] Recertification [x]  Plan of Care  [] Progress Note [] Discharge      To:  JENN Manriquez-ANGELICA      From:  Mandie Ruano, PT, DPT  Patient: Kimber Dexter     : 1950  Diagnosis: chronic left shoulder pain     Date: 2022  Treatment Diagnosis: decreased left UE ROM & strength, decreased ability to perform ADLs, increased left shoulder pain, & decreased postural awareness    Plan of Care/Certification Expiration Date: 22  Progress Report Period from:  2022  to 2022    Total # of Visits to Date: 1   No Show: 0    Canceled Appointment: 0     OBJECTIVE:   Short Term Goals - Time Frame for Short term goals: 2-4 weeks    Goals Current/Discharge status  Met   Short term goal 1: The patient will demonstrate improved postural awareness requiring <25% verbal cueing during functional mobility tasks & exercises  On-going [] yes  [x] no     Long Term Goals - Time Frame for Long term goals : 4-6 weeks  Goals Current/ Discharge status Met   Long term goal 1: The patient will be indep/compliant with HEP in order to self manage and maintain status upon D/C On-going [] yes  [x] no   Long term goal 2: The patient will have a increase in UEFI score >/=10 points in order to increase functional activity tolerance Exam: low; UEFI = 65/80 [] yes  [x] no   Long term goal 3: The patient will report decreased left shoulder pain </=1/10 in order to improve ability to perform functional mobility tasks   Pain Location: Shoulder    Pain Level: 1 (0-8/10)    Pain Descriptors: Aching   [] yes  [x] no   Long term goal 4: The patient will demonstrate improved B UE strength >/= 4+/5 in order to lift/carry with decreased pain Strength RUE  Comment: shoulder flexion 4-/5; shoulder abduction 4-/5; elbow flexion 4/5; elbow extension 4-/5  Strength LUE  Strength LUE:  (painful flexion & abduction)  Comment: shoulder flexion 3+/5; shoulder abduction 3/5; elbow flexion 4/5; elbow extension 4-/5 [] yes  [x] no   Long term goal 5: The patient will demonstrate improved left shoulder A/PROM >/= right shoulder A/PROM in order to increase ease with ADL's AROM RUE (degrees)  RUE AROM : WNL  PROM LUE (degrees)  LUE PROM:  (painful shoulder motions)  LUE General PROM: shoulder flexion 140*; shoulder abduction 140*; shoulder ER C7; shoulder IR L2; elbow flexion WFL; elbow extension WFL  AROM LUE (degrees)  LUE AROM :  (painful shoulder motions)  LUE General AROM: shoulder flexion 108*; shoulder abduction 101*; shoulder ER C7; shoulder IR L2; elbow flexion WFL; elbow extension [facility]  Spine  Cervical: flexion 55*; extension 34*; left side bend 25*; right side bend 45*; left rotation 55*; right rotation 45* [] yes  [x] no      Body structures, Functions, Activity limitations: Decreased ROM,Decreased strength,Decreased posture,Increased pain,Decreased ADL status  Assessment: Patient is a 80year old female who presents with chronic left shoulder pain that began ~2 years ago. Patient demonstrates decreased left UE ROM & strength, decreased ability to perform ADLs, increased left shoulder pain, & decreased postural awareness. Patient would benefit from outpatient PT services in order to address these impairments as well as improve patient's QOL & ease with ADLs.   Prognosis: Good  Discharge Recommendations: Continue to assess pending progress    PT Education: Goals;PT Role;Plan of Care;Home Exercise Program    PLAN: [x] Evaluate and Treat  Frequency/Duration:  Plan  Times per week: 2-3xs  Plan weeks: 4-6 weeks  Current Treatment Recommendations: [address] Education & Training,Patient/Caregiver Education & Training,Home Exercise Program,Neuromuscular Re-education,Integrated Dry Needling,Aquatics  Plan Comment: Patient plans to leave for [place] 1/30/22 so POC may be shortened. Precautions:       broken bones, arthritis, sinusitis acid reflux, thyroid disease, hearing loss, bowel/bladder control, asthma                     Patient Status:[x] Continue/ Initiate plan of Care    [] Discharge PT. Recommend pt continue with HEP. [] Additional visits requested, Please re-certify for additional visits:          Signature: Electronically signed by Trent Ramirez PT on 1/12/22 at 12:06 PM EST      If you have any questions or concerns, please don't hesitate to call. Thank you for your referral.    I have reviewed this plan of care and certify a need for medically necessary rehabilitation services.     Physician Signature:__________________________________________________________  Date:  Please sign and return

## 2022-01-12 NOTE — PROGRESS NOTES
[address]  PHYSICAL THERAPY EVALUATION    Date: 2022  Patient Name: Kareen Aguilar       MRN: [id number]   Account: [de-identified]   : 1950  (80 y.o.)   Gender: female   Referring Practitioner: CELIA Moreira                 Diagnosis: chronic left shoulder pain  Treatment Diagnosis: decreased left UE ROM & strength, decreased ability to perform ADLs, increased left shoulder pain, & decreased postural awareness  Additional Pertinent Hx: broken bones, arthritis, sinusitis acid reflux, thyroid disease, hearing loss, bowel/bladder control, asthma    Past Medical History:  has a past medical history of Allergic rhinitis, Asthma, Dizziness, DJD (degenerative joint disease), Eczema, GERD (gastroesophageal reflux disease), Hashimoto's disease, History of chronic sinusitis, Hx of colonic polyps, Hx of thyroid nodule, Hyperlipidemia, Lumbago, Mild tricuspid regurgitation, Normal cardiac stress test, POTS (postural orthostatic tachycardia syndrome), Prediabetes, and Trigger finger of right hand. Past Surgical History:   has a past surgical history that includes sinus surgery; Tonsillectomy and adenoidectomy ();  section; Nasal polyp surgery (); Mouth surgery (-POLYP); Colonoscopy (,,); Gum surgery; Nasal polyp surgery (FESS BILAT FISTULA REPAIR); and Breast surgery (-LEFT). Vital Signs  Patient Currently in Pain: Yes   Pain Screening  Patient Currently in Pain: Yes  Pain Assessment  Pain Assessment: 0-10  Pain Level: 1 (0-8/10)  Pain Type: Chronic pain  Pain Location: Shoulder  Pain Orientation: Left; Anterior  Pain Descriptors: Aching  Pain Frequency: Intermittent  Pain Onset: On-going  Functional Pain Assessment: Prevents or interferes with many active not passive activities  Non-Pharmaceutical Pain Intervention(s): Cold applied     Lives With: Spouse  Type of Home: House  Home Layout: Two level  Home Access: Stairs to enter with rails  Entrance Stairs - Number of Steps: 2  Entrance Stairs - Rails: None  ADL Assistance: Independent  Homemaking Assistance: Independent  Homemaking Responsibilities: Yes  Ambulation Assistance: Independent  Transfer Assistance: Independent  Active : Yes  Mode of Transportation: Car  Occupation: Retired     Subjective:  Subjective: Patient presents with chronic left shoulder pain ~2 years ago. Patient reports no recent x-rays since 1/4/21 have been performed on left shoulder. Patient reports no MELL. Patient sees a chiropractor & massage therapist regularly. Patient reports icing intermittently. Patient reports intermittently using OTC Tylenol for pain. Patient is right-handed. Comments: XR left shoulder 1/4/21: Advanced degenerative changes are visualized.  Superior to the glenoid there is a large calcification versus remote fracture.; RTD ~TBD; Patient reports she is leaving for [employer] 1/30/22-3/30/22.  100% PLOF; CLOF 50%    Objective:    Strength RUE  Comment: shoulder flexion 4-/5; shoulder abduction 4-/5; elbow flexion 4/5; elbow extension 4-/5  Strength LUE  Strength LUE:  (painful flexion & abduction)  Comment: shoulder flexion 3+/5; shoulder abduction 3/5; elbow flexion 4/5; elbow extension 4-/5     PROM LUE (degrees)  LUE PROM:  (painful shoulder motions)  LUE General PROM: shoulder flexion 140*; shoulder abduction 140*; shoulder ER C7; shoulder IR L2; elbow flexion WFL; elbow extension WFL  AROM RUE (degrees)  RUE AROM : WNL  AROM LUE (degrees)  LUE AROM :  (painful shoulder motions)  LUE General AROM: shoulder flexion 108*; shoulder abduction 101*; shoulder ER C7; shoulder IR L2; elbow flexion WFL; elbow extension [facility]    Spine  Cervical: flexion 55*; extension 34*; left side bend 25*; right side bend 45*; left rotation 55*; right rotation 45*    Observation/Palpation  Posture: Fair  Palpation: tenderness to palpation of left biceps tendon & left upper trap  Observation: rounded shoulders, forward head    Exercises:   Exercises  Exercise 1: pulleys**  Exercise 2: wall slides, flexion & abduction, 1 set x 10 reps, 5-10 seconds  Exercise 3: scapular retraction*  Exercise 4: IR stretch with towel, 1 set x 10 reps with 10 second hold  Exercise 5: corner stretch, 1 set x 3 reps with 20-30 second hold  Exercise 20: HEP: wall slides, towel stretch, corner stretch     Modalities:  Modalities  Moist heat: left shoulder*  Cryotherapy (Minutes\Location): left shoulder*  Ultrasound: left biceps tendon*  E-stim (parameters): left shoulder*     Manual:  Manual therapy  Joint mobilization: left shoulder*  PROM: left shoulder*  Soft Tissue Mobalization: left shoulder*  *Indicates exercise,modality, or manual techniques to be initiated when appropriate    Assessment: Body structures, Functions, Activity limitations: Decreased ROM,Decreased strength,Decreased posture,Increased pain,Decreased ADL status  Assessment: Patient is a 80year old female who presents with chronic left shoulder pain that began ~2 years ago. Patient demonstrates decreased left UE ROM & strength, decreased ability to perform ADLs, increased left shoulder pain, & decreased postural awareness. Patient would benefit from outpatient PT services in order to address these impairments as well as improve patient's QOL & ease with ADLs. Prognosis: Good  Discharge Recommendations: Continue to assess pending progress  Activity Tolerance: Patient Tolerated treatment well;Patient limited by pain; Patient limited by fatigue     Decision Making: Low Complexity  History: medium  Exam: low; UEFI = 65/80  Clinical Presentation: low      Plan  Frequency/Duration:  Plan  Times per week: 2-3xs  Plan weeks: 4-6 weeks  Current Treatment Recommendations: Raheel Coreas Management,Positioning,Modalities,Manual Therapy - Joint Manipulation,Manual Therapy - Soft Tissue Mobilization,Safety Education & Training,Patient/Caregiver Education & Training,Home Exercise Program,Neuromuscular Re-education,Integrated Dry Needling,Aquatics  Plan Comment: Patient plans to leave for [facility] 1/30/22 so POC may be shortened. Patient Education  New Education Provided: PT Education: Goals;PT Role;Plan of Care;Home Exercise Program    POST-PAIN     Pain Rating (0-10 pain scale):  2-3 /10  Location and pain description same as pre-treatment unless indicated. Action: [] NA  [] Call Physician  [x] Perform HEP  [] Meds as prescribed    Evaluation and patient rights have been reviewed and patient agrees with plan of care. Yes  [x]  No  []   Explain:       Hi Fall Risk Assessment  Risk Factor Scale  Score   History of Falls [x] Yes  [] No 25  0 25   Secondary Diagnosis [] Yes  [x] No 15  0    Ambulatory Aid [] Furniture  [] Crutches/cane/walker  [x] None/bedrest/wheelchair/nurse 30  15  0    IV/Heparin Lock [] Yes  [x] No 20  0    Gait/Transferring [] Impaired  [] Weak  [x] Normal/bedrest/immobile 20  10  0    Mental Status [] Forgets limitations  [x] Oriented to own ability 15  0       Total:25     Based on the Assessment score: check the appropriate box.   []  No intervention needed   Low =   Score of 0-24  [x]  Use standard prevention interventions Moderate =  Score of 24-44   [x] Discuss fall prevention strategies   [] Indicate moderate falls risk on eval  []  Use high risk prevention interventions High = Score of 45 and higher   [] Discuss fall prevention strategies   [] Provide supervision during treatment time    Goals  Short term goals  Time Frame for Short term goals: 2-4 weeks  Short term goal 1: The patient will demonstrate improved postural awareness requiring <25% verbal cueing during functional mobility tasks & exercises  Long term goals  Time Frame for Long term goals : 4-6 weeks  Long term goal 1: The patient will be indep/compliant with HEP in order to self manage and maintain status upon D/C  Long term goal 2: The patient will have a increase in UEFI score >/=10 points in order to increase functional activity tolerance  Long term goal 3: The patient will report decreased left shoulder pain </=1/10 in order to improve ability to perform functional mobility tasks  Long term goal 4: The patient will demonstrate improved B UE strength >/= 4+/5 in order to lift/carry with decreased pain  Long term goal 5:  The patient will demonstrate improved left shoulder A/PROM >/= right shoulder A/PROM in order to increase ease with ADL's    PT Individual Minutes  Time In: 0618  Time Out: 8986  Minutes: 52  Timed Code Treatment Minutes: 15 Minutes  Procedure Minutes: 37 minutes initial evaluation     Timed Activity Minutes Units   Ther Ex 15 1      Electronically signed by Hallie Pat, PT on 1/12/22 at 12:02 PM EST

## 2022-01-13 ENCOUNTER — HOSPITAL ENCOUNTER (OUTPATIENT)
Dept: PHYSICAL THERAPY | Age: 72
Setting detail: THERAPIES SERIES
Discharge: HOME | End: 2022-01-13
Payer: MEDICARE

## 2022-01-13 PROCEDURE — 97110 THERAPEUTIC EXERCISES: CPT

## 2022-01-13 PROCEDURE — 97140 MANUAL THERAPY 1/> REGIONS: CPT

## 2022-01-13 ASSESSMENT — PAIN DESCRIPTION - ORIENTATION: ORIENTATION: LEFT;ANTERIOR

## 2022-01-13 ASSESSMENT — PAIN SCALES - GENERAL: PAINLEVEL_OUTOF10: 3

## 2022-01-13 ASSESSMENT — PAIN DESCRIPTION - LOCATION: LOCATION: SHOULDER

## 2022-01-17 ENCOUNTER — HOSPITAL ENCOUNTER (OUTPATIENT)
Dept: PHYSICAL THERAPY | Age: 72
Setting detail: THERAPIES SERIES
Discharge: HOME | End: 2022-01-17
Payer: MEDICARE

## 2022-01-17 NOTE — PROGRESS NOTES
Therapy                            Cancellation/No-show Note      Date:  2022  Patient Name:  Beverley Horner  :  1950   MRN:  [id number]  Referring Practitioner: CELIA Guerrero  Diagnosis: chronic left shoulder pain    Visit Information:  PT Visit Information  Onset Date:  (~2 years ago)  PT Insurance Information: Medicare  Total # of Visits Approved: 99  Total # of Visits to Date: 2  Plan of Care/Certification Expiration Date: 22  No Show: 0  Canceled Appointment: 1  Progress Note Counter:  (cx 22)    For today's appointment patient:  [x]  Cancelled  []  Rescheduled appointment  []  No-show   []  Called pt to remind of next appointment     Reason given by patient:  []  Patient ill  []  Conflicting appointment  []  No transportation    []  Conflict with work  []  No reason given  [x]  Other: Weather      [x] Pt has future appointments scheduled, no follow up needed  [] Pt requests to be on hold.     Reason:   If > 2 weeks please discuss with therapist.  [] Therapist to call pt for follow up     Comments:       Signature: Electronically signed by Carol Alonso PT on 22 at 8:34 AM EST

## 2022-01-19 ENCOUNTER — HOSPITAL ENCOUNTER (OUTPATIENT)
Dept: PHYSICAL THERAPY | Age: 72
Setting detail: THERAPIES SERIES
Discharge: HOME | End: 2022-01-19
Payer: MEDICARE

## 2022-01-19 PROCEDURE — 97140 MANUAL THERAPY 1/> REGIONS: CPT

## 2022-01-19 PROCEDURE — 97110 THERAPEUTIC EXERCISES: CPT

## 2022-01-19 NOTE — PROGRESS NOTES
[address] [address]  Outpatient Physical Therapy    Treatment Note        Date: 2022  Patient: Eve Harrell  : 1950  ACCT #: [de-identified]  Referring Practitioner: CELIA Rodgers  Diagnosis: chronic left shoulder pain  Treatment Diagnosis: decreased left UE ROM & strength, decreased ability to perform ADLs, increased left shoulder pain, & decreased postural awareness    Visit Information:  PT Visit Information  Onset Date:  (~2 years ago)  PT Insurance Information: Medicare  Total # of Visits Approved: 99  Total # of Visits to Date: 3  Plan of Care/Certification Expiration Date: 22  No Show: 0  Canceled Appointment: 1  Progress Note Counter: 3/8-18 *Patient was 8 minutes late to therapy session this date. Subjective: Patient reports with 3/10 left shoulder. Comments: XR left shoulder 21: Advanced degenerative changes are visualized.  Superior to the glenoid there is a large calcification versus remote fracture.; RTD ~TBD; Patient reports she is leaving for [place] 22-3/30/22.  100% PLOF; CLOF 50%  HEP Compliance:  [x] Good [] Fair [] Poor [] Reports not doing due to:    Vital Signs  Patient Currently in Pain: Yes   Pain Screening  Patient Currently in Pain: Yes    OBJECTIVE:   Exercises  Exercise 1: pulley x 4 min  Exercise 2: wall slide flexion/ scaption X 10; difficulty with abd; changed to table slide abd X 10  Exercise 3: scapular retraction & lat pull downs, 2 sets x 15 reps with YTB  Exercise 4: IR stretch with towel, 1 set x 5 reps with 10 second hold  Exercise 6: lat pull downs, 2 sets x 15 reps with YTB  Exercise 7: AAROM with cane, shoulder flexion, abduction, ER, standing, with 5 second hold, 2 sets x 10 reps each motion  Exercise 20: HEP: continue current + resistance exercises    Strength: [x] NT  [] MMT completed:    ROM: [] NT  [x] ROM measurements:  AROM RUE (degrees)  RUE AROM : WNL  PROM LUE (degrees)  LUE PROM:  (painful shoulder motions)  AROM LUE (degrees)  LUE AROM :  (painful IR motion)  LUE General AROM: shoulder flexion 132*; shoulder abduction 125*; shoulder ER T1; shoulder IR T10; elbow flexion WFL; elbow extension WFL     Manual:   Manual therapy  Joint mobilization: left shoulder all directions grade I-III  X 7 min  PROM: left shoulder all planes of motion X 8 min  Soft Tissue Mobalization: left shoulder*  Other: total manual X 15 min - continues to be significantly limited with ER/IR PROM    Modalities:  Modalities  Moist heat: left shoulder*  Cryotherapy (Minutes\Location): left shoulder X 10 min  Ultrasound: left biceps tendon*  E-stim (parameters): left shoulder*     *Indicates exercise, modality, or manual techniques to be initiated when appropriate    Assessment: Body structures, Functions, Activity limitations: Decreased ROM,Decreased strength,Decreased posture,Increased pain,Decreased ADL status  Assessment: Continued with patient's left shoulder PT POC with fair/good tolerance. Improved left shoulder AROM noted in flexion & abduction. Continues to be limited in ER & IR with pain associated with those motions. Concluded with CP for pain at the end of session.   Treatment Diagnosis: decreased left UE ROM & strength, decreased ability to perform ADLs, increased left shoulder pain, & decreased postural awareness  Prognosis: Good     Goals:  Short term goals  Time Frame for Short term goals: 2-4 weeks  Short term goal 1: The patient will demonstrate improved postural awareness requiring <25% verbal cueing during functional mobility tasks & exercises    Long term goals  Time Frame for Long term goals : 4-6 weeks  Long term goal 1: The patient will be indep/compliant with HEP in order to self manage and maintain status upon D/C  Long term goal 2: The patient will have a increase in UEFI score >/=10 points in order to increase functional activity tolerance  Long term goal 3: The patient will report decreased left shoulder pain </=1/10 in order to improve ability to perform functional mobility tasks  Long term goal 4: The patient will demonstrate improved B UE strength >/= 4+/5 in order to lift/carry with decreased pain  Long term goal 5: The patient will demonstrate improved left shoulder A/PROM >/= right shoulder A/PROM in order to increase ease with ADL's  Progress toward goals: good with ROM improvements    POST-PAIN       Pain Rating (0-10 pain scale):  0 /10   Location and pain description same as pre-treatment unless indicated. Action: [] NA   [x] Perform HEP  [] Meds as prescribed  [x] Modalities as prescribed   [] Call Physician     Frequency/Duration:  Plan  Times per week: 2-3xs  Plan weeks: 4-6 weeks  Current Treatment Recommendations: [address] Education & Training,Patient/Caregiver Education & Training,Home Exercise Program,Neuromuscular Re-education,Integrated Dry Needling,Aquatics  Plan Comment: Patient plans to leave for [place] 1/30/22 so POC may be shortened. Pt to continue current HEP. See objective section for any therapeutic exercise changes, additions or modifications this date.     PT Individual Minutes  Time In: 0908  Time Out: 1000  Minutes: 52  Timed Code Treatment Minutes: 42 Minutes  Procedure Minutes: 10 minutes CP     Timed Activity Minutes Units   Ther Ex 27 2   Manual  15 1       Signature:  Electronically signed by Vangie Pritchard PT on 1/19/22 at 9:53 AM EST

## 2022-01-21 ENCOUNTER — HOSPITAL ENCOUNTER (OUTPATIENT)
Dept: PHYSICAL THERAPY | Age: 72
Setting detail: THERAPIES SERIES
Discharge: HOME | End: 2022-01-21
Payer: MEDICARE

## 2022-01-21 DIAGNOSIS — J30.9 ALLERGIC RHINITIS, UNSPECIFIED SEASONALITY, UNSPECIFIED TRIGGER: ICD-10-CM

## 2022-01-21 PROCEDURE — 97140 MANUAL THERAPY 1/> REGIONS: CPT

## 2022-01-21 PROCEDURE — 97110 THERAPEUTIC EXERCISES: CPT

## 2022-01-21 NOTE — PROGRESS NOTES
[address] [address]  Outpatient Physical Therapy    Treatment Note        Date: 2022  Patient: Darlene Machado  : 1950  ACCT #: [de-identified]  Referring Practitioner: CELIA Sullivan  Diagnosis: chronic left shoulder pain  Treatment Diagnosis: decreased left UE ROM & strength, decreased ability to perform ADLs, increased left shoulder pain, & decreased postural awareness    Visit Information:  PT Visit Information  Onset Date:  (~2 years ago)  PT Insurance Information: Medicare  Total # of Visits Approved: 99  Total # of Visits to Date: 4  Plan of Care/Certification Expiration Date: 22  No Show: 0  Canceled Appointment: 1  Progress Note Counter:     Subjective: Patient reports no pain this date in left shoulder. Patient went swimming in pool prior to therapy session. Comments: XR left shoulder 21: Advanced degenerative changes are visualized.  Superior to the glenoid there is a large calcification versus remote fracture.; RTD ~TBD; Patient reports she is leaving for [place] 22-3/30/22.  100% PLOF; CLOF 50%  HEP Compliance:  [x] Good [] Fair [] Poor [] Reports not doing due to:    Vital Signs  Patient Currently in Pain: Denies   Pain Screening  Patient Currently in Pain: Denies    OBJECTIVE:   Exercises  Exercise 2: wall slide scaption 2 sets x 15 reps  Exercise 3: scapular retraction & lat pull downs, 2 sets x 15 reps with YTB  Exercise 6: lat pull downs, 2 sets x 15 reps with YTB  Exercise 8: UBE, level 1.0, 2.5 minutes forward & 2.5 minutes backwards  Exercise 9: punches with YTB, 2 sets x 15 reps  Exercise 10: \"hug\"  with YTB, 2 sets x 15 reps  Exercise 11: \"W\" with YTB, 2 sets x 15 reps  Exercise 20: HEP: continue current + resistance exercises    Strength: [x] NT  [] MMT completed:    ROM: [] NT  [x] ROM measurements:  AROM RUE (degrees)  RUE AROM : WNL  PROM LUE (degrees)  LUE General PROM: shoulder flexion 150*     Manual:   Manual therapy  PROM: left shoulder all planes of motion X 10 min  Soft Tissue Mobalization: left shoulder*  Other: total manual X 10 minutes    Modalities:  Modalities  Moist heat: left shoulder*  Cryotherapy (Minutes\Location): left shoulder X 10 min  Ultrasound: left biceps tendon*  E-stim (parameters): left shoulder*     *Indicates exercise, modality, or manual techniques to be initiated when appropriate    Assessment: Body structures, Functions, Activity limitations: Decreased ROM,Decreased strength,Decreased posture,Increased pain,Decreased ADL status  Assessment: Continued with patient's left shoulder PT POC with fair/good tolerance. Patient continuing to report low levels in pain. Improved left shoulder PROM noted in flexion. Initiated new shoulder stabilization & strengthening/endurance exercises. Concluded with CP for pain at the end of session. Treatment Diagnosis: decreased left UE ROM & strength, decreased ability to perform ADLs, increased left shoulder pain, & decreased postural awareness  Prognosis: Good     Goals:  Short term goals  Time Frame for Short term goals: 2-4 weeks  Short term goal 1: The patient will demonstrate improved postural awareness requiring <25% verbal cueing during functional mobility tasks & exercises    Long term goals  Time Frame for Long term goals : 4-6 weeks  Long term goal 1: The patient will be indep/compliant with HEP in order to self manage and maintain status upon D/C  Long term goal 2: The patient will have a increase in UEFI score >/=10 points in order to increase functional activity tolerance  Long term goal 3: The patient will report decreased left shoulder pain </=1/10 in order to improve ability to perform functional mobility tasks  Long term goal 4: The patient will demonstrate improved B UE strength >/= 4+/5 in order to lift/carry with decreased pain  Long term goal 5:  The patient will demonstrate improved left shoulder A/PROM >/= right shoulder A/PROM in order to increase ease with ADL's  Progress toward goals: good with improved left shoulder PROM    POST-PAIN       Pain Rating (0-10 pain scale):   0/10   Location and pain description same as pre-treatment unless indicated. Action: [] NA   [x] Perform HEP  [] Meds as prescribed  [] Modalities as prescribed   [] Call Physician     Frequency/Duration:  Plan  Times per week: 2-3xs  Plan weeks: 4-6 weeks  Current Treatment Recommendations: [address] Education & Training,Patient/Caregiver Education & Training,Home Exercise Program,Neuromuscular Re-education,Integrated Dry Needling,Aquatics  Plan Comment: Patient plans to leave for [place] 1/30/22 so POC may be shortened. Pt to continue current HEP. See objective section for any therapeutic exercise changes, additions or modifications this date.     PT Individual Minutes  Time In: 9810  Time Out: 1010  Minutes: 48  Timed Code Treatment Minutes: 38 Minutes  Procedure Minutes: 10 minutes CP     Timed Activity Minutes Units   Ther Ex 28 2   Manual  10 1       Signature:  Electronically signed by Afia Hutchins PT on 1/21/22 at 11:04 AM EST

## 2022-01-23 RX ORDER — FLUTICASONE PROPIONATE 50 MCG
2 SPRAY, SUSPENSION (ML) NASAL DAILY
Qty: 3 EACH | Refills: 1 | Status: SHIPPED | OUTPATIENT
Start: 2022-01-23 | End: 2022-09-05 | Stop reason: SDUPTHER

## 2022-01-24 ENCOUNTER — HOSPITAL ENCOUNTER (OUTPATIENT)
Dept: PHYSICAL THERAPY | Age: 72
Setting detail: THERAPIES SERIES
Discharge: HOME | End: 2022-01-24
Payer: MEDICARE

## 2022-01-24 PROCEDURE — 97140 MANUAL THERAPY 1/> REGIONS: CPT

## 2022-01-24 PROCEDURE — 97110 THERAPEUTIC EXERCISES: CPT

## 2022-01-24 ASSESSMENT — PAIN DESCRIPTION - PAIN TYPE: TYPE: CHRONIC PAIN

## 2022-01-24 ASSESSMENT — PAIN DESCRIPTION - DESCRIPTORS: DESCRIPTORS: ACHING

## 2022-01-24 ASSESSMENT — PAIN SCALES - GENERAL: PAINLEVEL_OUTOF10: 2

## 2022-01-24 ASSESSMENT — PAIN DESCRIPTION - ORIENTATION: ORIENTATION: LEFT;ANTERIOR

## 2022-01-24 ASSESSMENT — PAIN DESCRIPTION - LOCATION: LOCATION: SHOULDER

## 2022-01-24 NOTE — PROGRESS NOTES
[id number] [address]  Outpatient Physical Therapy    Treatment Note        Date: 2022  Patient: Hunter Herr  : 1950  ACCT #: [de-identified]  Referring Practitioner: CELIA Hare  Diagnosis: chronic left shoulder pain  Treatment Diagnosis: decreased left UE ROM & strength, decreased ability to perform ADLs, increased left shoulder pain, & decreased postural awareness    Visit Information:  PT Visit Information  Onset Date:  (~2 years ago)  PT Insurance Information: Medicare  Total # of Visits Approved: 99  Total # of Visits to Date: 5  Plan of Care/Certification Expiration Date: 22  No Show: 0  Canceled Appointment: 1  Progress Note Counter:     Subjective: Patient reports compliance with HEP. Patient reports 2/10 level of pain in right shoulder. Comments: XR left shoulder 21: Advanced degenerative changes are visualized. Superior to the glenoid there is a large calcification versus remote fracture.; RTD ~TBD; Patient reports she is leaving for [place] 22-3/30/22.  100% PLOF; CLOF 50%  HEP Compliance:  [x] Good [] Fair [] Poor [] Reports not doing due to:    Vital Signs  Patient Currently in Pain: Yes   Pain Screening  Patient Currently in Pain: Yes  Pain Assessment  Pain Assessment: 0-10  Pain Level: 2  Pain Type: Chronic pain  Pain Location: Shoulder  Pain Orientation: Left; Anterior  Pain Descriptors: Aching    OBJECTIVE:   Exercises  Exercise 8: UBE, level 2.0, 2.5 minutes forward & 2.5 minutes backwards  Exercise 12: shoulder flexion & scaption, 2 sets x 10 reps each UE each motion, 2# weight right & 1# weight left  Exercise 13: supine serratus punches with 2# weight, 2 sets x 10 reps  Exercise 14: side-lying ER & flexion & abduction with 1# weight left & 2# weight right, 2 sets x 10 reps  Exercise 20: HEP: continue with current    Strength: [x] NT  [] MMT completed:     ROM: [] NT  [x] ROM measurements:  AROM RUE (degrees)  RUE AROM : WNL  PROM LUE (degrees)  ABRAM General PROM: shoulder flexion 150*; shoulder abduction 145*     Manual:   Manual therapy  PROM: left shoulder all planes of motion X 8 min  Soft Tissue Mobalization: left shoulder*  Other: total manual X 8 minutes    Modalities:  Modalities  Moist heat: left shoulder*  Cryotherapy (Minutes\Location): left shoulder X 10 min  Ultrasound: left biceps tendon*  E-stim (parameters): left shoulder*     *Indicates exercise, modality, or manual techniques to be initiated when appropriate    Assessment: Body structures, Functions, Activity limitations: Decreased ROM,Decreased strength,Decreased posture,Increased pain,Decreased ADL status  Assessment: Continued to progress patient's left shoulder PT POC with fair/good tolerance. Patient continuing to report low levels of pain & the ability to swim regularly. Initiated new shoulder stabilization & strengthening/endurance exercises. Concluded with CP for pain at the end of session for pain control.   Treatment Diagnosis: decreased left UE ROM & strength, decreased ability to perform ADLs, increased left shoulder pain, & decreased postural awareness  Prognosis: Good     Goals:  Short term goals  Time Frame for Short term goals: 2-4 weeks  Short term goal 1: The patient will demonstrate improved postural awareness requiring <25% verbal cueing during functional mobility tasks & exercises    Long term goals  Time Frame for Long term goals : 4-6 weeks  Long term goal 1: The patient will be indep/compliant with HEP in order to self manage and maintain status upon D/C  Long term goal 2: The patient will have a increase in UEFI score >/=10 points in order to increase functional activity tolerance  Long term goal 3: The patient will report decreased left shoulder pain </=1/10 in order to improve ability to perform functional mobility tasks  Long term goal 4: The patient will demonstrate improved B UE strength >/= 4+/5 in order to lift/carry with decreased pain  Long term goal 5: The patient will demonstrate improved left shoulder A/PROM >/= right shoulder A/PROM in order to increase ease with ADL's  Progress toward goals: good with PROM left shoulder improvement    POST-PAIN       Pain Rating (0-10 pain scale):   2/10   Location and pain description same as pre-treatment unless indicated. Action: [] NA   [x] Perform HEP  [] Meds as prescribed  [x] Modalities as prescribed   [] Call Physician     Frequency/Duration:  Plan  Times per week: 2-3xs  Plan weeks: 4-6 weeks  Current Treatment Recommendations: [address] Education & Training,Patient/Caregiver Education & Training,Home Exercise Program,Neuromuscular Re-education,Integrated Dry Needling,Aquatics  Plan Comment: Patient plans to leave for [place] 1/30/22 so POC may be shortened. Pt to continue current HEP. See objective section for any therapeutic exercise changes, additions or modifications this date.     PT Individual Minutes  Time In: 6897  Time Out: 0930  Minutes: 49  Timed Code Treatment Minutes: 39 Minutes  Procedure Minutes: 10 minutes CP     Timed Activity Minutes Units   Ther Ex 31 2   Manual  8 1     Signature:  Electronically signed by Francheska Moreno PT on 1/24/22 at 9:32 AM EST

## 2022-01-26 ENCOUNTER — HOSPITAL ENCOUNTER (OUTPATIENT)
Dept: PHYSICAL THERAPY | Age: 72
Setting detail: THERAPIES SERIES
Discharge: HOME | End: 2022-01-26
Payer: MEDICARE

## 2022-01-26 PROCEDURE — 97110 THERAPEUTIC EXERCISES: CPT

## 2022-01-26 PROCEDURE — 97140 MANUAL THERAPY 1/> REGIONS: CPT

## 2022-01-26 ASSESSMENT — PAIN SCALES - GENERAL: PAINLEVEL_OUTOF10: 2

## 2022-01-26 ASSESSMENT — PAIN DESCRIPTION - ORIENTATION: ORIENTATION: LEFT

## 2022-01-26 ASSESSMENT — PAIN DESCRIPTION - LOCATION: LOCATION: SHOULDER

## 2022-01-26 ASSESSMENT — PAIN DESCRIPTION - DESCRIPTORS: DESCRIPTORS: SORE;ACHING

## 2022-01-26 NOTE — PROGRESS NOTES
[address] [address]  Outpatient Physical Therapy    Treatment Note        Date: 2022  Patient: Samuel Aceves  : 1950  ACCT #: [de-identified]  Referring Practitioner: CELIA Bolanos  Diagnosis: chronic left shoulder pain  Treatment Diagnosis: decreased left UE ROM & strength, decreased ability to perform ADLs, increased left shoulder pain, & decreased postural awareness    Visit Information:  PT Visit Information  Onset Date:  (~2 years ago)  PT Insurance Information: Medicare  Total # of Visits Approved: 99  Total # of Visits to Date: 6  Plan of Care/Certification Expiration Date: 22  No Show: 0  Progress Note Due Date: 22  Canceled Appointment: 1  Progress Note Counter:     Subjective: Patient reports she will be leaving for [place] in a couple days. Feels she has enough exercises to complete while she is out of town. Lt shoulder felt tired after last visit. Comments: XR left shoulder 21: Advanced degenerative changes are visualized.  Superior to the glenoid there is a large calcification versus remote fracture.; RTD ~TBD; Patient reports she is leaving for [place] 22-3/30/22.  100% PLOF; CLOF 50%  HEP Compliance:  [x] Good [] Fair [] Poor [] Reports not doing due to:    Vital Signs  Patient Currently in Pain: Yes   Pain Screening  Patient Currently in Pain: Yes  Pain Assessment  Pain Level: 2  Pain Location: Shoulder  Pain Orientation: Left  Pain Descriptors: Sore;Aching    OBJECTIVE:   Exercises  Exercise 1: pulley x 3 min  Exercise 4: IR stretch with towel, 1 set x 3 reps with 20 second hold  Exercise 8: UBE, level 2.0, 2.5 minutes forward & 2.5 minutes backwards  Exercise 12: shoulder flexion & scaption, 2 sets x 10 reps each UE each motion, 2# weight right & 1# weight left  Exercise 13: supine serratus punches with 2# weight, 2 sets x 10 reps  Exercise 14: side-lying ER & flexion & abduction with 1# weight left & 2# weight right, 2 sets x 10 reps  Exercise 15: Horizontal chest pulls YTB x10  Exercise 16: Overhead ball toss 30sec x3 (Medium size green Pball)  Exercise 20: HEP: Chest pulls         ROM: [] NT  [x] ROM measurements:     AROM LUE (degrees)  LUE General AROM: Flexion 140deg, abd 130deg     Manual:   Manual therapy  PROM: left shoulder all planes of motion X 8 min    Modalities:  Modalities  Moist heat: left shoulder*  Cryotherapy (Minutes\Location): [place] shoulders X 10 min  Ultrasound: left biceps tendon*  E-stim (parameters): left shoulder*     *Indicates exercise, modality, or manual techniques to be initiated when appropriate    Assessment: Body structures, Functions, Activity limitations: Decreased ROM,Decreased strength,Decreased posture,Increased pain,Decreased ADL status  Assessment: Continued to progress strengthening such as with parascapular with chest pulls and overall ball toss. Demonstrates increased ROM with Lt shoulder AROM, progressing towards goal. Occ cues to maintain exercises within a comfortable range. Anticipate D/C next visit as patient will be leaving out of town for ~3 months.   Treatment Diagnosis: decreased left UE ROM & strength, decreased ability to perform ADLs, increased left shoulder pain, & decreased postural awareness  Prognosis: Good       Goals:  Short term goals  Time Frame for Short term goals: 2-4 weeks  Short term goal 1: The patient will demonstrate improved postural awareness requiring <25% verbal cueing during functional mobility tasks & exercises    Long term goals  Time Frame for Long term goals : 4-6 weeks  Long term goal 1: The patient will be indep/compliant with HEP in order to self manage and maintain status upon D/C  Long term goal 2: The patient will have a increase in UEFI score >/=10 points in order to increase functional activity tolerance  Long term goal 3: The patient will report decreased left shoulder pain </=1/10 in order to improve ability to perform functional mobility

## 2022-01-28 ENCOUNTER — HOSPITAL ENCOUNTER (OUTPATIENT)
Dept: PHYSICAL THERAPY | Age: 72
Setting detail: THERAPIES SERIES
Discharge: HOME | End: 2022-01-28
Payer: MEDICARE

## 2022-01-28 PROCEDURE — 97140 MANUAL THERAPY 1/> REGIONS: CPT

## 2022-01-28 PROCEDURE — 97110 THERAPEUTIC EXERCISES: CPT

## 2022-01-28 ASSESSMENT — PAIN DESCRIPTION - PAIN TYPE: TYPE: CHRONIC PAIN

## 2022-01-28 ASSESSMENT — PAIN DESCRIPTION - LOCATION: LOCATION: SHOULDER

## 2022-01-28 ASSESSMENT — PAIN SCALES - GENERAL: PAINLEVEL_OUTOF10: 3

## 2022-01-28 ASSESSMENT — PAIN DESCRIPTION - ORIENTATION: ORIENTATION: LEFT

## 2022-01-28 ASSESSMENT — PAIN DESCRIPTION - DESCRIPTORS: DESCRIPTORS: ACHING;SORE

## 2022-01-28 ASSESSMENT — PAIN DESCRIPTION - FREQUENCY: FREQUENCY: INTERMITTENT

## 2022-01-28 NOTE — PROGRESS NOTES
[id number] [address]  Outpatient Physical Therapy    Treatment Note        Date: 2022  Patient: Kimber Dexter  : 1950  ACCT #: [de-identified]  Referring Practitioner: CELIA Manriquez  Diagnosis: chronic left shoulder pain  Treatment Diagnosis: decreased left UE ROM & strength, decreased ability to perform ADLs, increased left shoulder pain, & decreased postural awareness    Visit Information:  PT Visit Information  Onset Date:  (~2 years ago)  PT Insurance Information: Medicare  Total # of Visits Approved: 99  Total # of Visits to Date: 7  Plan of Care/Certification Expiration Date: 22  No Show: 0  Canceled Appointment: 1  Progress Note Counter:     Subjective: Patient reports average pain since beginning outpatient PT in left shoulder has been <2/10. Comments: XR left shoulder 21: Advanced degenerative changes are visualized. Superior to the glenoid there is a large calcification versus remote fracture.; RTD ~TBD; Patient reports she is leaving for [place] 22-3/30/22.  100% PLOF; CLOF 50%  HEP Compliance:  [x] Good [] Fair [] Poor [] Reports not doing due to:    Vital Signs  Patient Currently in Pain: Yes   Pain Screening  Patient Currently in Pain: Yes  Pain Assessment  Pain Assessment: 0-10  Pain Level: 3  Pain Type: Chronic pain  Pain Location: Shoulder  Pain Orientation: Left  Pain Descriptors: Aching; Sore  Pain Frequency: Intermittent    OBJECTIVE:   Exercises  Exercise 19: objective measurements taken  Exercise 20: HEP: reviewed all exercises    Strength: [] NT  [x] MMT completed:  Strength RUE  Comment: shoulder flexion 4+/5; shoulder abduction 4+/5; elbow flexion 5/5; elbow extension 5/5  Strength LUE  Strength LUE:  (painful flexion & abduction)  Comment: shoulder flexion 4/5; shoulder abduction 4/5; elbow flexion 5/5; elbow extension 4+/5       ROM: [] NT  [x] ROM measurements:  AROM RUE (degrees)  RUE AROM : WNL  PROM LUE (degrees)  LUE PROM:  (painful shoulder motions)  LUE General PROM: shoulder flexion 147*; shoulder abduction 155*; shoulder ER T3; shoulder IR T8; elbow flexion WFL; elbow extension WFL  AROM LUE (degrees)  LUE AROM :  (slightly painful shoulder motions)  LUE General AROM: shoulder flexion 130*; shoulder abduction 130*; shoulder ER T3; shoulder IR T8; elbow flexion WFL; elbow extension WFLs     Manual:   Manual therapy  PROM: left shoulder all planes of motion X 10 min    Modalities:  Modalities  Moist heat: left shoulder*  Cryotherapy (Minutes\Location): [place] shoulders X 10 min  Ultrasound: left biceps tendon*  E-stim (parameters): left shoulder*     *Indicates exercise, modality, or manual techniques to be initiated when appropriate    Assessment: Body structures, Functions, Activity limitations: Decreased ROM,Decreased strength,Decreased posture,Increased pain,Decreased ADL status  Assessment: Patient is a 80year old female who presented on 1/12/22 with chronic left shoulder pain who has participated in 7 outpatient PT sessions from 1/12/22-1/28/22. Patient has demonstrated improvements in postural awareness, compliance with HEP, overall left shoulder pain rating, bilateral UE strength, & left UE ROM. Patient did not make improvement in UEFI score, decreasing 1 point since initial evaluation despite all positive feedback with increased ability to perform ADLs & IADLs self-reported by patient. Patient is recommended to continue to perform HEP regularly every day in order to continue working to improve overall left shoulder function as patient's outpatient PT POC has been requested by patient to be cut short due to patient leaving for [place] for 30+ days on 1/30/22. Patient is in agreement with discharge from outpatient PT services this date.   Treatment Diagnosis: decreased left UE ROM & strength, decreased ability to perform ADLs, increased left shoulder pain, & decreased postural awareness  Prognosis: Good     Goals:  Short term goals  Time Frame for Short term goals: 2-4 weeks  Short term goal 1: The patient will demonstrate improved postural awareness requiring <25% verbal cueing during functional mobility tasks & exercises    Long term goals  Time Frame for Long term goals : 4-6 weeks  Long term goal 1: The patient will be indep/compliant with HEP in order to self manage and maintain status upon D/C  Long term goal 2: The patient will have a increase in UEFI score >/=10 points in order to increase functional activity tolerance  Long term goal 3: The patient will report decreased left shoulder pain </=1/10 in order to improve ability to perform functional mobility tasks  Long term goal 4: The patient will demonstrate improved B UE strength >/= 4+/5 in order to lift/carry with decreased pain  Long term goal 5: The patient will demonstrate improved left shoulder A/PROM >/= right shoulder A/PROM in order to increase ease with ADL's  Progress toward goals: see discharge summary from today's date for details    POST-PAIN       Pain Rating (0-10 pain scale):  0 /10   Location and pain description same as pre-treatment unless indicated. Action: [] NA   [x] Perform HEP  [] Meds as prescribed  [] Modalities as prescribed   [] Call Physician     Frequency/Duration:  Plan  Times per week: 2-3xs  Plan weeks: 4-6 weeks  Specific instructions for Next Treatment: D/C next visit  Current Treatment Recommendations: [address] Education & Training,Patient/Caregiver Education & Training,Home Exercise Program,Neuromuscular Re-education,Integrated Dry Needling,Aquatics  Plan Comment: Patient plans to leave for [place] 1/30/22 so POC may be shortened. Pt to continue current HEP. See objective section for any therapeutic exercise changes, additions or modifications this date.     PT Individual Minutes  Time In: 0920  Time Out: 6042  Minutes: 35  Timed Code Treatment Minutes: 25 Minutes  Procedure Minutes: 10 minutes CP     Timed Activity Minutes Units   Ther Ex 15 1   Manual  10 1       Signature:  Electronically signed by Nicholas Martinez PT on 1/28/22 at 2:03 PM EST

## 2022-01-28 NOTE — PROGRESS NOTES
[address]     Ph: [phone]  Fax: [phone]    [] Certification  [] Recertification []  Plan of Care  [] Progress Note [x] Discharge      To:  CELIA Pulido      From:  Jefferson Portillo, PT, DPT  Patient: Radha Singh     : 1950  Diagnosis: chronic left shoulder pain     Date: 2022  Treatment Diagnosis: decreased left UE ROM & strength, decreased ability to perform ADLs, increased left shoulder pain, & decreased postural awareness    Plan of Care/Certification Expiration Date: 22  Progress Report Period from:  2022  to 2022    Total # of Visits to Date: 7   No Show: 0    Canceled Appointment: 1     OBJECTIVE:   Short Term Goals - Time Frame for Short term goals: 2-4 weeks    Goals Current/Discharge status  Met   Short term goal 1: The patient will demonstrate improved postural awareness requiring <25% verbal cueing during functional mobility tasks & exercises  Met - no cueing required for proper postural awareness [x] yes  [] no     Long Term Goals - Time Frame for Long term goals : 4-6 weeks  Goals Current/ Discharge status Met   Long term goal 1: The patient will be indep/compliant with HEP in order to self manage and maintain status upon D/C Independent & complaint [x] yes  [] no   Long term goal 2: The patient will have a increase in UEFI score >/=10 points in order to increase functional activity tolerance Exam: UEFI = 64/80 (-1 point decreased since initial evaluation) [] yes  [x] no   Long term goal 3: The patient will report decreased left shoulder pain </=1/10 in order to improve ability to perform functional mobility tasks   Pain Location: Shoulder    Pain Level: 3    Pain Descriptors: Aching,Sore    Average pain rating reported by patient since beginning PT is <2/10 [x] yes  [x] no   Long term goal 4: The patient will demonstrate improved B UE strength >/= 4+/5 in order to lift/carry with decreased pain Strength RUE  Comment: shoulder flexion 4+/5; shoulder abduction 4+/5; elbow flexion 5/5; elbow extension 5/5  Strength LUE  Strength LUE:  (painful flexion & abduction)  Comment: shoulder flexion 4/5; shoulder abduction 4/5; elbow flexion 5/5; elbow extension 4+/5 [x] yes  [] no   Long term goal 5: The patient will demonstrate improved left shoulder A/PROM >/= right shoulder A/PROM in order to increase ease with ADL's AROM RUE (degrees)  RUE AROM : WNL  PROM LUE (degrees)  LUE PROM:  (painful shoulder motions)  LUE General PROM: shoulder flexion 147*; shoulder abduction 155*; shoulder ER T3; shoulder IR T8; elbow flexion WFL; elbow extension WFL  AROM LUE (degrees)  LUE AROM :  (slightly painful shoulder motions)  LUE General AROM: shoulder flexion 130*; shoulder abduction 130*; shoulder ER T3; shoulder IR T8; elbow flexion WFL; elbow extension WFLs [x] yes  [x] no      Assessment: Patient is a 80year old female who presented on 1/12/22 with chronic left shoulder pain who has participated in 7 outpatient PT sessions from 1/12/22-1/28/22. Patient has demonstrated improvements in postural awareness, compliance with HEP, overall left shoulder pain rating, bilateral UE strength, & left UE ROM. Patient did not make improvement in UEFI score, decreasing 1 point since initial evaluation despite all positive feedback with increased ability to perform ADLs & IADLs self-reported by patient. Patient is recommended to continue to perform HEP regularly every day in order to continue working to improve overall left shoulder function as patient's outpatient PT POC has been requested by patient to be cut short due to patient leaving for [place] for 30+ days on 1/30/22. Patient is in agreement with discharge from outpatient PT services this date. PLAN:                       Patient Status:[] Continue/ Initiate plan of Care    [x] Discharge PT.   Recommend pt continue with HEP. [] Additional visits requested, Please re-certify for additional visits:          Signature: Electronically signed by Amber Donnelly PT on 1/28/22 at 2:04 PM EST      If you have any questions or concerns, please don't hesitate to call. Thank you for your referral.    I have reviewed this plan of care and certify a need for medically necessary rehabilitation services.     Physician Signature:__________________________________________________________  Date:  Please sign and return

## 2022-06-13 DIAGNOSIS — F41.9 ANXIETY AND DEPRESSION: ICD-10-CM

## 2022-06-13 DIAGNOSIS — E03.9 HYPOTHYROIDISM, UNSPECIFIED TYPE: ICD-10-CM

## 2022-06-13 DIAGNOSIS — J30.9 ALLERGIC RHINITIS, UNSPECIFIED SEASONALITY, UNSPECIFIED TRIGGER: ICD-10-CM

## 2022-06-13 DIAGNOSIS — E78.5 HYPERLIPIDEMIA, UNSPECIFIED HYPERLIPIDEMIA TYPE: ICD-10-CM

## 2022-06-13 DIAGNOSIS — F32.A ANXIETY AND DEPRESSION: ICD-10-CM

## 2022-06-13 RX ORDER — LEVOTHYROXINE SODIUM 0.07 MG/1
TABLET ORAL
Qty: 90 TABLET | Refills: 1 | Status: SHIPPED | OUTPATIENT
Start: 2022-06-13

## 2022-06-13 RX ORDER — ESCITALOPRAM OXALATE 5 MG/1
TABLET ORAL
Qty: 90 TABLET | Refills: 1 | Status: SHIPPED | OUTPATIENT
Start: 2022-06-13

## 2022-06-13 RX ORDER — MONTELUKAST SODIUM 10 MG/1
TABLET ORAL
Qty: 90 TABLET | Refills: 1 | Status: SHIPPED | OUTPATIENT
Start: 2022-06-13

## 2022-06-13 RX ORDER — SIMVASTATIN 20 MG
TABLET ORAL
Qty: 90 TABLET | Refills: 1 | Status: SHIPPED | OUTPATIENT
Start: 2022-06-13

## 2022-06-14 RX ORDER — DEXLANSOPRAZOLE 60 MG/1
60 CAPSULE, DELAYED RELEASE ORAL DAILY
Qty: 90 CAPSULE | Refills: 1 | Status: SHIPPED | OUTPATIENT
Start: 2022-06-14 | End: 2022-06-20

## 2022-07-06 ENCOUNTER — OFFICE VISIT (OUTPATIENT)
Dept: FAMILY MEDICINE CLINIC | Age: 72
End: 2022-07-06
Payer: MEDICARE

## 2022-07-06 VITALS
HEIGHT: 69 IN | BODY MASS INDEX: 26.36 KG/M2 | TEMPERATURE: 96.5 F | WEIGHT: 178 LBS | HEART RATE: 75 BPM | OXYGEN SATURATION: 97 % | DIASTOLIC BLOOD PRESSURE: 74 MMHG | SYSTOLIC BLOOD PRESSURE: 122 MMHG

## 2022-07-06 DIAGNOSIS — F41.9 ANXIETY AND DEPRESSION: ICD-10-CM

## 2022-07-06 DIAGNOSIS — R53.83 FATIGUE, UNSPECIFIED TYPE: ICD-10-CM

## 2022-07-06 DIAGNOSIS — E78.5 HYPERLIPIDEMIA, UNSPECIFIED HYPERLIPIDEMIA TYPE: Primary | ICD-10-CM

## 2022-07-06 DIAGNOSIS — R53.83 FATIGUE, UNSPECIFIED TYPE: Primary | ICD-10-CM

## 2022-07-06 DIAGNOSIS — E03.9 HYPOTHYROIDISM, UNSPECIFIED TYPE: ICD-10-CM

## 2022-07-06 DIAGNOSIS — F32.A ANXIETY AND DEPRESSION: ICD-10-CM

## 2022-07-06 DIAGNOSIS — R07.9 CHEST PAIN, UNSPECIFIED TYPE: ICD-10-CM

## 2022-07-06 DIAGNOSIS — E78.5 HYPERLIPIDEMIA, UNSPECIFIED HYPERLIPIDEMIA TYPE: ICD-10-CM

## 2022-07-06 DIAGNOSIS — Z12.31 OTHER SCREENING MAMMOGRAM: ICD-10-CM

## 2022-07-06 DIAGNOSIS — Z78.0 POST-MENOPAUSAL: ICD-10-CM

## 2022-07-06 DIAGNOSIS — R73.03 BORDERLINE DIABETES: ICD-10-CM

## 2022-07-06 LAB
ALBUMIN SERPL-MCNC: 4.8 G/DL (ref 3.5–4.6)
ALP BLD-CCNC: 61 U/L (ref 40–130)
ALT SERPL-CCNC: 16 U/L (ref 0–33)
ANION GAP SERPL CALCULATED.3IONS-SCNC: 12 MEQ/L (ref 9–15)
AST SERPL-CCNC: 20 U/L (ref 0–35)
BASOPHILS ABSOLUTE: 0.1 K/UL (ref 0–0.2)
BASOPHILS RELATIVE PERCENT: 1.2 %
BILIRUB SERPL-MCNC: 0.4 MG/DL (ref 0.2–0.7)
BUN BLDV-MCNC: 15 MG/DL (ref 8–23)
CALCIUM SERPL-MCNC: 9.6 MG/DL (ref 8.5–9.9)
CHLORIDE BLD-SCNC: 101 MEQ/L (ref 95–107)
CHOLESTEROL, TOTAL: 196 MG/DL (ref 0–199)
CO2: 27 MEQ/L (ref 20–31)
CREAT SERPL-MCNC: 0.77 MG/DL (ref 0.5–0.9)
EOSINOPHILS ABSOLUTE: 0.3 K/UL (ref 0–0.7)
EOSINOPHILS RELATIVE PERCENT: 6.2 %
GFR AFRICAN AMERICAN: >60
GFR NON-AFRICAN AMERICAN: >60
GLOBULIN: 2.6 G/DL (ref 2.3–3.5)
GLUCOSE BLD-MCNC: 97 MG/DL (ref 70–99)
HBA1C MFR BLD: 5.6 % (ref 4.8–5.9)
HCT VFR BLD CALC: 39.3 % (ref 37–47)
HDLC SERPL-MCNC: 70 MG/DL (ref 40–59)
HEMOGLOBIN: 12.9 G/DL (ref 12–16)
LDL CHOLESTEROL CALCULATED: 108 MG/DL (ref 0–129)
LYMPHOCYTES ABSOLUTE: 1.2 K/UL (ref 1–4.8)
LYMPHOCYTES RELATIVE PERCENT: 23.4 %
MCH RBC QN AUTO: 30.8 PG (ref 27–31.3)
MCHC RBC AUTO-ENTMCNC: 32.9 % (ref 33–37)
MCV RBC AUTO: 93.7 FL (ref 82–100)
MONOCYTES ABSOLUTE: 0.5 K/UL (ref 0.2–0.8)
MONOCYTES RELATIVE PERCENT: 9.1 %
NEUTROPHILS ABSOLUTE: 3.1 K/UL (ref 1.4–6.5)
NEUTROPHILS RELATIVE PERCENT: 60.1 %
PDW BLD-RTO: 13.3 % (ref 11.5–14.5)
PLATELET # BLD: 281 K/UL (ref 130–400)
POTASSIUM SERPL-SCNC: 4.7 MEQ/L (ref 3.4–4.9)
RBC # BLD: 4.2 M/UL (ref 4.2–5.4)
SODIUM BLD-SCNC: 140 MEQ/L (ref 135–144)
TOTAL PROTEIN: 7.4 G/DL (ref 6.3–8)
TRIGL SERPL-MCNC: 92 MG/DL (ref 0–150)
TSH REFLEX: 1.18 UIU/ML (ref 0.44–3.86)
WBC # BLD: 5.2 K/UL (ref 4.8–10.8)

## 2022-07-06 PROCEDURE — 1090F PRES/ABSN URINE INCON ASSESS: CPT | Performed by: NURSE PRACTITIONER

## 2022-07-06 PROCEDURE — 1123F ACP DISCUSS/DSCN MKR DOCD: CPT | Performed by: NURSE PRACTITIONER

## 2022-07-06 PROCEDURE — 1036F TOBACCO NON-USER: CPT | Performed by: NURSE PRACTITIONER

## 2022-07-06 PROCEDURE — G8427 DOCREV CUR MEDS BY ELIG CLIN: HCPCS | Performed by: NURSE PRACTITIONER

## 2022-07-06 PROCEDURE — 99214 OFFICE O/P EST MOD 30 MIN: CPT | Performed by: NURSE PRACTITIONER

## 2022-07-06 PROCEDURE — G8417 CALC BMI ABV UP PARAM F/U: HCPCS | Performed by: NURSE PRACTITIONER

## 2022-07-06 PROCEDURE — G8399 PT W/DXA RESULTS DOCUMENT: HCPCS | Performed by: NURSE PRACTITIONER

## 2022-07-06 PROCEDURE — 3017F COLORECTAL CA SCREEN DOC REV: CPT | Performed by: NURSE PRACTITIONER

## 2022-07-06 ASSESSMENT — ENCOUNTER SYMPTOMS
COUGH: 0
SHORTNESS OF BREATH: 0
DIARRHEA: 0
CONSTIPATION: 0

## 2022-07-06 NOTE — PROGRESS NOTES
Subjective  Chief Complaint   Patient presents with    Anxiety     6 month follow up        HPI     Pt here for general check up    Has been feeling \"tired'  Sleeping well. Broke foot in January- healed well    GERD:   Sharonda Quang- now is generic. Has had increased GERD at time/chest pain    Struggling with shoulder pain. Wondering about potentially PT? Has still been swimming. Working out pretty much daily.      Takes aspirin for joint pain    Patient Active Problem List    Diagnosis Date Noted    Lumbosacral spondylosis without myelopathy 2017    Osteoarthritis of spine with radiculopathy, lumbar region 08/10/2017    Hashimoto's disease     Prediabetes     POTS (postural orthostatic tachycardia syndrome)     Trigger finger of right hand     Palpitations 2014    Hypothyroidism 2014    GERD (gastroesophageal reflux disease) 2014    Allergic rhinitis 2014    Asthma 2014    Osteoarthritis 2014    Lumbago 2014    Hyperlipidemia 2014     Past Medical History:   Diagnosis Date    Allergic rhinitis     Asthma     Dizziness     Dr. Kurtis Fiore DJD (degenerative joint disease)     Eczema     GERD (gastroesophageal reflux disease)     Hashimoto's disease     History of chronic sinusitis     Hx of colonic polyps     BENIGN    Hx of thyroid nodule     biospy benign    Hyperlipidemia     Lumbago     Mild tricuspid regurgitation 2014    echo    Normal cardiac stress test 2014    POTS (postural orthostatic tachycardia syndrome)     Dr. Kurtis Fiore Prediabetes     Trigger finger of right hand     4th and 5th digits     Past Surgical History:   Procedure Laterality Date    BREAST SURGERY  -LEFT    FIBROID TUMOR LUMPECTOMY     SECTION      COLONOSCOPY  ,,2010    GUM SURGERY      GUM IMPLANT    MOUTH SURGERY  -POLYP    NASAL POLYP SURGERY      KILLIAN-YULIYA POLYPS AND WINDOWS, TURBINATES TRIMMED    NASAL POLYP SURGERY  FESS BILAT FISTULA REPAIR    SINUS SURGERY      TONSILLECTOMY AND ADENOIDECTOMY       Family History   Problem Relation Age of Onset    Cancer Other     Diabetes Other     Heart Disease Other     Diabetes Father     Thyroid Disease Father     Thyroid Disease Sister     Diabetes Sister      Social History     Socioeconomic History    Marital status:      Spouse name: None    Number of children: None    Years of education: None    Highest education level: None   Occupational History    None   Tobacco Use    Smoking status: Former Smoker     Packs/day: 0.33     Years: 5.00     Pack years: 1.65     Quit date: 1976     Years since quittin.5    Smokeless tobacco: Never Used   Substance and Sexual Activity    Alcohol use: Yes     Alcohol/week: 0.0 standard drinks     Comment: occasionally    Drug use: No    Sexual activity: Not Currently   Other Topics Concern    None   Social History Narrative    None     Social Determinants of Health     Financial Resource Strain: Low Risk     Difficulty of Paying Living Expenses: Not hard at all   Food Insecurity: No Food Insecurity    Worried About Running Out of Food in the Last Year: Never true    Tobi of Food in the Last Year: Never true   Transportation Needs:     Lack of Transportation (Medical): Not on file    Lack of Transportation (Non-Medical):  Not on file   Physical Activity:     Days of Exercise per Week: Not on file    Minutes of Exercise per Session: Not on file   Stress:     Feeling of Stress : Not on file   Social Connections:     Frequency of Communication with Friends and Family: Not on file    Frequency of Social Gatherings with Friends and Family: Not on file    Attends Yazidism Services: Not on file    Active Member of Clubs or Organizations: Not on file    Attends Club or Organization Meetings: Not on file    Marital Status: Not on file   Intimate Partner Violence:     Fear of Current or Ex-Partner: Not on file    Emotionally Abused: Not on file    Physically Abused: Not on file    Sexually Abused: Not on file   Housing Stability:     Unable to Pay for Housing in the Last Year: Not on file    Number of [place] in the Last Year: Not on file    Unstable Housing in the Last Year: Not on file     Current Outpatient Medications on File Prior to Visit   Medication Sig Dispense Refill    DEXILANT 60 MG CPDR delayed release capsule Take 1 capsule by mouth daily 90 capsule 1    simvastatin (ZOCOR) 20 MG tablet TAKE 1 TABLET EVERY EVENING 90 tablet 1    levothyroxine (SYNTHROID) 75 MCG tablet TAKE 1 TABLET DAILY 90 tablet 1    escitalopram (LEXAPRO) 5 MG tablet TAKE 1 TABLET DAILY 90 tablet 1    montelukast (SINGULAIR) 10 MG tablet TAKE 1 TABLET NIGHTLY 90 tablet 1    fluticasone (FLONASE) 50 MCG/ACT nasal spray 2 sprays by Nasal route daily 3 each 1    betamethasone, augmented, (DIPROLENE) 0.05 % lotion Apply topically 2 times daily. 60 mL 3    clobetasol (TEMOVATE) 0.05 % cream Apply topically 2 times daily prn Monday through Friday only, take weekends off 60 g 2    hydrOXYzine (VISTARIL) 25 MG capsule Take 1 capsule by mouth nightly as needed for Anxiety 30 capsule 2    azelastine (ASTELIN) 0.1 % nasal spray 2 sprays by Nasal route 2 times daily 2 Spray in each nostril 2 Bottle 0    albuterol sulfate (PROAIR RESPICLICK) 982 (90 BASE) MCG/ACT aerosol powder inhalation Inhale 2 puffs into the lungs every 4 hours as needed for Wheezing or Shortness of Breath 1 Inhaler 0    magnesium oxide (MAG-OX) 400 MG tablet Take 1 tablet by mouth daily. 30 tablet 6    aspirin 325 MG tablet Take 325 mg by mouth 2 times daily.  cetirizine (ZYRTEC ALLERGY) 10 MG tablet Take 10 mg by mouth nightly.  calcium carbonate (OSCAL) 500 MG TABS tablet Take 500 mg by mouth daily With D, K ZINC       No current facility-administered medications on file prior to visit.      Allergies   Allergen Reactions    Peanut (Diagnostic) Anaphylaxis     Asthma    Aspirin Other (See Comments)     Asthma  ASTHMA    Cat Hair Extract     Ceclor [Cefaclor] Hives    Dust Mite Extract     Sulfa Antibiotics Hives    Adhesive Tape Rash       Review of Systems   Constitutional: Positive for fatigue. Respiratory: Negative for cough and shortness of breath. Cardiovascular: Negative for chest pain. Gastrointestinal: Negative for constipation and diarrhea. Musculoskeletal: Positive for arthralgias. Objective  Vitals:    07/06/22 0819   BP: 122/74   Pulse: 75   Temp: (!) 96.5 °F (35.8 °C)   SpO2: 97%   Weight: 178 lb (80.7 kg)   Height: 5' 9\" (1.753 m)     Physical Exam  Vitals and nursing note reviewed. Constitutional:       Appearance: Normal appearance. She is normal weight. HENT:      Head: Normocephalic. Nose: Nose normal.      Mouth/Throat:      Mouth: Mucous membranes are moist.      Pharynx: Oropharynx is clear. Eyes:      Extraocular Movements: Extraocular movements intact. Conjunctiva/sclera: Conjunctivae normal.      Pupils: Pupils are equal, round, and reactive to light. Cardiovascular:      Rate and Rhythm: Normal rate and regular rhythm. Pulses: Normal pulses. Heart sounds: Normal heart sounds. Pulmonary:      Effort: Pulmonary effort is normal.      Breath sounds: Normal breath sounds. Musculoskeletal:      Left shoulder: Decreased range of motion. Normal strength. Cervical back: Neck supple. Skin:     General: Skin is warm. Neurological:      General: No focal deficit present. Mental Status: She is alert and oriented to person, place, and time. Mental status is at baseline. Psychiatric:         Mood and Affect: Mood normal.         Behavior: Behavior normal.         Thought Content: Thought content normal.         Judgment: Judgment normal.       Assessment & Plan     Diagnosis Orders   1.  Hyperlipidemia, unspecified hyperlipidemia type  Lipid Panel   2. Post-menopausal  DEXA BONE DENSITY AXIAL SKELETON   3. Other screening mammogram  CAMILO DIGITAL SCREEN W OR WO CAD BILATERAL   4. Chest pain, unspecified type  NM MYOCARDIAL SPECT REST EXERCISE OR RX   5. Hypothyroidism, unspecified type  TSH with Reflex   6. Anxiety and depression  Comprehensive Metabolic Panel   7. Borderline diabetes  Hemoglobin A1C   8.  Fatigue, unspecified type  Vitamin B12 & Folate    CBC with Auto Differential       Orders Placed This Encounter   Procedures    DEXA BONE DENSITY AXIAL SKELETON     Standing Status:   Future     Standing Expiration Date:   7/6/2023     Order Specific Question:   Reason for exam:     Answer:   post menopausal    CAMILO DIGITAL SCREEN W OR WO CAD BILATERAL     Standing Status:   Future     Standing Expiration Date:   9/6/2023     Order Specific Question:   Reason for exam:     Answer:   other screening mammogram    NM MYOCARDIAL SPECT REST EXERCISE OR RX     Standing Status:   Future     Standing Expiration Date:   7/6/2023     Order Specific Question:   Reason for Exam?     Answer:   Angina     Order Specific Question:   Procedure Type     Answer:   Exercise     Order Specific Question:   Reason for exam:     Answer:   chest pain, increased gerd    Comprehensive Metabolic Panel     Standing Status:   Future     Standing Expiration Date:   7/6/2023    Lipid Panel     Standing Status:   Future     Standing Expiration Date:   7/6/2023     Order Specific Question:   Is Patient Fasting?/# of Hours     Answer:   12    Hemoglobin A1C     Standing Status:   Future     Standing Expiration Date:   7/6/2023    TSH with Reflex     Standing Status:   Future     Standing Expiration Date:   7/6/2023    Vitamin B12 & Folate     Standing Status:   Future     Standing Expiration Date:   7/6/2023    CBC with Auto Differential     Standing Status:   Future     Standing Expiration Date:   7/6/2023       No orders of the defined types were placed in this encounter. Side effects, adverse effects of the medication prescribed today, as well as treatment plan/ rationale and result expectations have been discussed with the patient who expresses understanding and desires to proceed. Close follow up to evaluate treatment results and for coordination of care. I have reviewed the patient's medical history in detail and updated the computerized patient record. As always, patient is advised that if symptoms worsen in any way they will proceed to the nearest emergency room. FU in 6 mos.      Phyllis Zepeda, JENN - CNP

## 2022-07-07 LAB
FOLATE: 16.8 NG/ML
VITAMIN B-12: 249 PG/ML (ref 232–1245)
VITAMIN D 25-HYDROXY: 37 NG/ML

## 2022-07-13 ENCOUNTER — TELEPHONE (OUTPATIENT)
Dept: FAMILY MEDICINE CLINIC | Age: 72
End: 2022-07-13

## 2022-07-13 ENCOUNTER — PATIENT MESSAGE (OUTPATIENT)
Dept: FAMILY MEDICINE CLINIC | Age: 72
End: 2022-07-13

## 2022-07-13 DIAGNOSIS — H57.89 EYE SWELLING: Primary | ICD-10-CM

## 2022-07-13 NOTE — TELEPHONE ENCOUNTER
Pt calling asking if the pt is able to take Keflex- the pt's eye  Is wanting to prescribe Keflex for a sty in her eye. Pt is asking if she is allergic to it. Pharmacy is telling pt to call her pcp to find out. Please advise. Pt phone number is [phone].

## 2022-07-13 NOTE — TELEPHONE ENCOUNTER
In your chart it shows that you have an allergy to the cephalosporins. So no, you shouldn't take it.

## 2022-07-13 NOTE — TELEPHONE ENCOUNTER
Patient is aware of NL's message     States she has had reaction in past to amoxicillin. And the other medication provider recommended was Augmentin. Wanting to know if pcp can recommend what she should take.

## 2022-07-14 RX ORDER — DOXYCYCLINE HYCLATE 100 MG
100 TABLET ORAL 2 TIMES DAILY
Qty: 14 TABLET | Refills: 0 | Status: SHIPPED | OUTPATIENT
Start: 2022-07-14 | End: 2022-07-21

## 2022-07-14 RX ORDER — DEXLANSOPRAZOLE 60 MG/1
60 CAPSULE, DELAYED RELEASE ORAL DAILY
Qty: 90 CAPSULE | Refills: 1 | Status: SHIPPED | OUTPATIENT
Start: 2022-07-14

## 2022-07-14 NOTE — TELEPHONE ENCOUNTER
Pt calling back stating that she would be happy with either clindamycin or doxycycline to DM/V. Needs to be 100 mg and  not 150 mg. The optometrist wants this to be strong enough to get rid of the sty.

## 2022-07-14 NOTE — TELEPHONE ENCOUNTER
From: Krishna Sam  To: Nehal Hebert  Sent: 7/13/2022 8:52 PM EDT  Subject: Danilo Del Rosario    I have been on the phone over four hours today trying to get this Dexilant problem sorted out. THIS MUST HAPPEN EARLY ON the 14 th !!!!!!!!  I need you to send a prescription for Kit Dunnellon 60MG Dr[facility]   Be sure to put the lettering necessary for ONLY Dexlant. (That was missing, according to them, and that is why the sent out Dexlansoprozole)   I have gotten them to agree to send me one more order of Dexilant , before the cut off of Aug1st which requires an early refill override.  But it has to be done on the July 14 Early! Thanks so much, sorry to be such a pain!

## 2022-07-21 ENCOUNTER — HOSPITAL ENCOUNTER (OUTPATIENT)
Dept: NUCLEAR MEDICINE | Age: 72
Discharge: HOME | End: 2022-07-23
Payer: MEDICARE

## 2022-07-21 ENCOUNTER — HOSPITAL ENCOUNTER (OUTPATIENT)
Dept: NON INVASIVE DIAGNOSTICS | Age: 72
Discharge: HOME | End: 2022-07-21
Payer: MEDICARE

## 2022-07-21 DIAGNOSIS — R07.9 CHEST PAIN, UNSPECIFIED TYPE: ICD-10-CM

## 2022-07-21 PROCEDURE — 6360000002 HC RX W HCPCS: Performed by: NURSE PRACTITIONER

## 2022-07-21 PROCEDURE — 2580000003 HC RX 258: Performed by: NURSE PRACTITIONER

## 2022-07-21 PROCEDURE — 78452 HT MUSCLE IMAGE SPECT MULT: CPT

## 2022-07-21 PROCEDURE — 93017 CV STRESS TEST TRACING ONLY: CPT

## 2022-07-21 PROCEDURE — 3430000000 HC RX DIAGNOSTIC RADIOPHARMACEUTICAL: Performed by: NURSE PRACTITIONER

## 2022-07-21 PROCEDURE — A9502 TC99M TETROFOSMIN: HCPCS | Performed by: NURSE PRACTITIONER

## 2022-07-21 RX ORDER — SODIUM CHLORIDE 0.9 % (FLUSH) 0.9 %
10 SYRINGE (ML) INJECTION PRN
Status: DISCONTINUED | OUTPATIENT
Start: 2022-07-21 | End: 2022-07-24 | Stop reason: HOSPADM

## 2022-07-21 RX ADMIN — TETROFOSMIN 11.5 MILLICURIE: 1.38 INJECTION, POWDER, LYOPHILIZED, FOR SOLUTION INTRAVENOUS at 09:00

## 2022-07-21 RX ADMIN — SODIUM CHLORIDE, PRESERVATIVE FREE 10 ML: 5 INJECTION INTRAVENOUS at 10:19

## 2022-07-21 RX ADMIN — REGADENOSON 0.4 MG: 0.08 INJECTION, SOLUTION INTRAVENOUS at 10:18

## 2022-07-21 RX ADMIN — TETROFOSMIN 30 MILLICURIE: 1.38 INJECTION, POWDER, LYOPHILIZED, FOR SOLUTION INTRAVENOUS at 10:18

## 2022-07-21 RX ADMIN — SODIUM CHLORIDE, PRESERVATIVE FREE 10 ML: 5 INJECTION INTRAVENOUS at 10:18

## 2022-07-21 NOTE — PROGRESS NOTES
Reviewed history, allergies, and medications. Patient has hip pain and weakness due to npo. So changed to lexiscan testing. Consent confirmed. Lexiscan exam explained. Placed patient on monitor. @ 1018 Nuclear Medicine tech here to inject Spring. SOB noted during recovery phase. Denied chest pain. No ectopy noted. Doctor to further review and interpret results. Patient off monitor and instructed to eat, will have last part of exam in 1 hour.

## 2022-07-25 PROCEDURE — 93018 CV STRESS TEST I&R ONLY: CPT | Performed by: INTERNAL MEDICINE

## 2022-07-25 NOTE — PROCEDURES
[address]                      [address], [address] [facility]                              CARDIAC STRESS TEST    PATIENT NAME: Isabel Zimmerman                    :        1950  MED REC NO:   [id number]                            ROOM:  ACCOUNT NO:   [de-identified]                           ADMIT DATE: 2022  PROVIDER:     Adriel Ortiz DO    CARDIOVASCULAR DIAGNOSTIC DEPARTMENT    DATE OF STUDY:  2022    Bill Zuleta MYOCARDIAL PERFUSION STRESS TEST    ORDERING PROVIDER:  Vannessa Musa NP    EXAM TYPE:  Stress Myocardial Perfusion Regadenosin 1-day. REASON FOR EXAM:  Chest pain. PROCEDURE DESCRIPTION:  The patient was injected intravenously at rest  with technetium-99m tetrofosmin followed by resting SPECT myocardial  perfusion imaging and then underwent stress protocol using regadenoson  0.4 mg injected intravenously. At that time, technetium-99m tetrofosmin  stress dose was injected intravenously and SPECT myocardial perfusion  and gated imaging were repeated. Rest dose:  11.5 mCi  Stress dose:  30.5 mCi    FINDINGS:  The stress and rest images exhibit homogeneous uptake of  tracer throughout the left ventricular myocardium. There is no evidence  of stress-induced reversible perfusion abnormalities to suggest  myocardial ischemia. Gated imaging exhibits normal left ventricular  size and normal wall motion and myocardial thickening. EKG analysis did  not demonstrate ST-segment changes nor arrhythmias concerning for  myocardial ischemia. LVEF:  81%  TID ratio:  0.92    IMPRESSION:  1. No evidence of stress-induced myocardial ischemia. 2.  Normal left ventricular systolic function.         Karel Marina DO    D: 2022 #23:09:39       T: 2022 23:49:23     [email or web address]  Job#: [id number]     Doc#: [id number]    CC:

## 2022-07-29 ENCOUNTER — HOSPITAL ENCOUNTER (OUTPATIENT)
Dept: WOMENS IMAGING | Age: 72
Discharge: HOME | End: 2022-07-31
Payer: MEDICARE

## 2022-07-29 DIAGNOSIS — Z12.31 OTHER SCREENING MAMMOGRAM: ICD-10-CM

## 2022-07-29 DIAGNOSIS — Z78.0 POST-MENOPAUSAL: ICD-10-CM

## 2022-07-29 PROCEDURE — 77063 BREAST TOMOSYNTHESIS BI: CPT

## 2022-07-29 PROCEDURE — 77080 DXA BONE DENSITY AXIAL: CPT

## 2022-09-02 DIAGNOSIS — J30.9 ALLERGIC RHINITIS, UNSPECIFIED SEASONALITY, UNSPECIFIED TRIGGER: ICD-10-CM

## 2022-09-02 NOTE — TELEPHONE ENCOUNTER
----- Message from Drea Arvizu sent at 9/2/2022 11:56 AM EDT -----  Subject: Refill Request    QUESTIONS  Name of Medication? fluticasone (FLONASE) 50 MCG/ACT nasal spray  Patient-reported dosage and instructions? 50 mg 2 sprays by Nasal route   daily  How many days do you have left? 0  [facility]erred Pharm[facility]? [facility] phone number (if available)? [phone]  Additional Information for Provider? fax number [phone]  ---------------------------------------------------------------------------  --------------  CALL BACK INFO  What is the best way for the office to contact you? OK to leave message on   voicemail  Preferred Call Back Phone Number? [phone]  ---------------------------------------------------------------------------  --------------  SCRIPT ANSWERS  Relationship to Patient? Third Party  Third Party Type? Pharmacy?    Representative Name? Kelle Wells

## 2022-09-05 RX ORDER — FLUTICASONE PROPIONATE 50 MCG
2 SPRAY, SUSPENSION (ML) NASAL DAILY
Qty: 3 EACH | Refills: 1 | Status: SHIPPED | OUTPATIENT
Start: 2022-09-05

## 2022-11-18 DIAGNOSIS — J30.9 ALLERGIC RHINITIS, UNSPECIFIED SEASONALITY, UNSPECIFIED TRIGGER: ICD-10-CM

## 2022-11-18 DIAGNOSIS — F32.A ANXIETY AND DEPRESSION: ICD-10-CM

## 2022-11-18 DIAGNOSIS — E78.5 HYPERLIPIDEMIA, UNSPECIFIED HYPERLIPIDEMIA TYPE: ICD-10-CM

## 2022-11-18 DIAGNOSIS — F41.9 ANXIETY AND DEPRESSION: ICD-10-CM

## 2022-11-18 DIAGNOSIS — E03.9 HYPOTHYROIDISM, UNSPECIFIED TYPE: ICD-10-CM

## 2022-11-18 NOTE — TELEPHONE ENCOUNTER
Comments:    Last Office Visit (last PCP visit):   7/6/2022    Next Visit Date:  Future Appointments   Date Time Provider Giovani Shane   1/9/2023 10:30 AM JENN Dickson CNP [facility] [place]       **If hasn't been seen in over a year OR hasn't followed up according to last diabetes/ADHD visit, make appointment for patient before sending refill to provider.     Rx requested:  Requested Prescriptions     Pending Prescriptions Disp Refills    levothyroxine (SYNTHROID) 75 MCG tablet [Pharmacy Med Name: LEVOTHYROXIN TAB 75MCG] 90 tablet 1     Sig: TAKE 1 TABLET DAILY    escitalopram (LEXAPRO) 5 MG tablet [Pharmacy Med Name: ESCITALOPRAM TAB 5MG] 90 tablet 1     Sig: TAKE 1 TABLET DAILY    simvastatin (ZOCOR) 20 MG tablet [Pharmacy Med Name: SIMVASTATIN  TAB 20MG] 90 tablet 1     Sig: TAKE 1 TABLET EVERY EVENING    montelukast (SINGULAIR) 10 MG tablet [Pharmacy Med Name: MONTELUKAST  TAB 10MG] 90 tablet 1     Sig: TAKE 1 TABLET NIGHTLY

## 2022-11-20 RX ORDER — LEVOTHYROXINE SODIUM 0.07 MG/1
TABLET ORAL
Qty: 90 TABLET | Refills: 1 | Status: SHIPPED | OUTPATIENT
Start: 2022-11-20

## 2022-11-20 RX ORDER — MONTELUKAST SODIUM 10 MG/1
TABLET ORAL
Qty: 90 TABLET | Refills: 1 | Status: SHIPPED | OUTPATIENT
Start: 2022-11-20

## 2022-11-20 RX ORDER — ESCITALOPRAM OXALATE 5 MG/1
TABLET ORAL
Qty: 90 TABLET | Refills: 1 | Status: SHIPPED | OUTPATIENT
Start: 2022-11-20

## 2022-11-20 RX ORDER — SIMVASTATIN 20 MG
TABLET ORAL
Qty: 90 TABLET | Refills: 1 | Status: SHIPPED | OUTPATIENT
Start: 2022-11-20

## 2023-01-09 ENCOUNTER — OFFICE VISIT (OUTPATIENT)
Dept: FAMILY MEDICINE CLINIC | Age: 73
End: 2023-01-09
Payer: MEDICARE

## 2023-01-09 ENCOUNTER — TELEPHONE (OUTPATIENT)
Dept: FAMILY MEDICINE CLINIC | Age: 73
End: 2023-01-09

## 2023-01-09 VITALS
SYSTOLIC BLOOD PRESSURE: 122 MMHG | BODY MASS INDEX: 25.92 KG/M2 | HEIGHT: 69 IN | WEIGHT: 175 LBS | OXYGEN SATURATION: 98 % | HEART RATE: 76 BPM | DIASTOLIC BLOOD PRESSURE: 62 MMHG

## 2023-01-09 DIAGNOSIS — G47.00 INSOMNIA, UNSPECIFIED TYPE: ICD-10-CM

## 2023-01-09 DIAGNOSIS — E78.5 HYPERLIPIDEMIA, UNSPECIFIED HYPERLIPIDEMIA TYPE: ICD-10-CM

## 2023-01-09 DIAGNOSIS — E53.8 B12 DEFICIENCY: ICD-10-CM

## 2023-01-09 DIAGNOSIS — F32.A ANXIETY AND DEPRESSION: ICD-10-CM

## 2023-01-09 DIAGNOSIS — F41.9 ANXIETY AND DEPRESSION: ICD-10-CM

## 2023-01-09 DIAGNOSIS — Z00.00 MEDICARE ANNUAL WELLNESS VISIT, SUBSEQUENT: Primary | ICD-10-CM

## 2023-01-09 DIAGNOSIS — E03.9 HYPOTHYROIDISM, UNSPECIFIED TYPE: ICD-10-CM

## 2023-01-09 DIAGNOSIS — J30.9 ALLERGIC RHINITIS, UNSPECIFIED SEASONALITY, UNSPECIFIED TRIGGER: ICD-10-CM

## 2023-01-09 DIAGNOSIS — F41.9 ANXIETY: ICD-10-CM

## 2023-01-09 DIAGNOSIS — R73.03 PREDIABETES: ICD-10-CM

## 2023-01-09 DIAGNOSIS — J40 SINOBRONCHITIS: ICD-10-CM

## 2023-01-09 DIAGNOSIS — J32.9 SINOBRONCHITIS: ICD-10-CM

## 2023-01-09 DIAGNOSIS — M25.511 ACUTE PAIN OF RIGHT SHOULDER: ICD-10-CM

## 2023-01-09 LAB
ALBUMIN SERPL-MCNC: 4.2 G/DL (ref 3.5–4.6)
ALP BLD-CCNC: 70 U/L (ref 40–130)
ALT SERPL-CCNC: 21 U/L (ref 0–33)
ANION GAP SERPL CALCULATED.3IONS-SCNC: 11 MEQ/L (ref 9–15)
AST SERPL-CCNC: 21 U/L (ref 0–35)
BILIRUB SERPL-MCNC: 0.3 MG/DL (ref 0.2–0.7)
BUN BLDV-MCNC: 19 MG/DL (ref 8–23)
CALCIUM SERPL-MCNC: 9.4 MG/DL (ref 8.5–9.9)
CHLORIDE BLD-SCNC: 100 MEQ/L (ref 95–107)
CO2: 29 MEQ/L (ref 20–31)
CREAT SERPL-MCNC: 0.82 MG/DL (ref 0.5–0.9)
GFR SERPL CREATININE-BSD FRML MDRD: >60 ML/MIN/{1.73_M2}
GLOBULIN: 3 G/DL (ref 2.3–3.5)
GLUCOSE BLD-MCNC: 96 MG/DL (ref 70–99)
HBA1C MFR BLD: 5.9 % (ref 4.8–5.9)
POTASSIUM SERPL-SCNC: 4.9 MEQ/L (ref 3.4–4.9)
SODIUM BLD-SCNC: 140 MEQ/L (ref 135–144)
TOTAL PROTEIN: 7.2 G/DL (ref 6.3–8)
TSH REFLEX: 1.44 UIU/ML (ref 0.44–3.86)

## 2023-01-09 PROCEDURE — G8484 FLU IMMUNIZE NO ADMIN: HCPCS | Performed by: NURSE PRACTITIONER

## 2023-01-09 PROCEDURE — 3017F COLORECTAL CA SCREEN DOC REV: CPT | Performed by: NURSE PRACTITIONER

## 2023-01-09 PROCEDURE — G0439 PPPS, SUBSEQ VISIT: HCPCS | Performed by: NURSE PRACTITIONER

## 2023-01-09 PROCEDURE — 1123F ACP DISCUSS/DSCN MKR DOCD: CPT | Performed by: NURSE PRACTITIONER

## 2023-01-09 RX ORDER — AZELASTINE 1 MG/ML
2 SPRAY, METERED NASAL 2 TIMES DAILY
Qty: 2 EACH | Refills: 0 | Status: SHIPPED | OUTPATIENT
Start: 2023-01-09

## 2023-01-09 RX ORDER — METHYLPREDNISOLONE 4 MG/1
TABLET ORAL
Qty: 21 TABLET | Refills: 0 | Status: SHIPPED | OUTPATIENT
Start: 2023-01-09 | End: 2023-01-15

## 2023-01-09 SDOH — ECONOMIC STABILITY: FOOD INSECURITY: WITHIN THE PAST 12 MONTHS, THE FOOD YOU BOUGHT JUST DIDN'T LAST AND YOU DIDN'T HAVE MONEY TO GET MORE.: NEVER TRUE

## 2023-01-09 SDOH — ECONOMIC STABILITY: FOOD INSECURITY: WITHIN THE PAST 12 MONTHS, YOU WORRIED THAT YOUR FOOD WOULD RUN OUT BEFORE YOU GOT MONEY TO BUY MORE.: NEVER TRUE

## 2023-01-09 ASSESSMENT — PATIENT HEALTH QUESTIONNAIRE - PHQ9
SUM OF ALL RESPONSES TO PHQ QUESTIONS 1-9: 0
3. TROUBLE FALLING OR STAYING ASLEEP: 0
SUM OF ALL RESPONSES TO PHQ QUESTIONS 1-9: 0
2. FEELING DOWN, DEPRESSED OR HOPELESS: 0
8. MOVING OR SPEAKING SO SLOWLY THAT OTHER PEOPLE COULD HAVE NOTICED. OR THE OPPOSITE, BEING SO FIGETY OR RESTLESS THAT YOU HAVE BEEN MOVING AROUND A LOT MORE THAN USUAL: 0
SUM OF ALL RESPONSES TO PHQ QUESTIONS 1-9: 0
5. POOR APPETITE OR OVEREATING: 0
6. FEELING BAD ABOUT YOURSELF - OR THAT YOU ARE A FAILURE OR HAVE LET YOURSELF OR YOUR FAMILY DOWN: 0
10. IF YOU CHECKED OFF ANY PROBLEMS, HOW DIFFICULT HAVE THESE PROBLEMS MADE IT FOR YOU TO DO YOUR WORK, TAKE CARE OF THINGS AT HOME, OR GET ALONG WITH OTHER PEOPLE: 0
9. THOUGHTS THAT YOU WOULD BE BETTER OFF DEAD, OR OF HURTING YOURSELF: 0
4. FEELING TIRED OR HAVING LITTLE ENERGY: 0
7. TROUBLE CONCENTRATING ON THINGS, SUCH AS READING THE NEWSPAPER OR WATCHING TELEVISION: 0
SUM OF ALL RESPONSES TO PHQ9 QUESTIONS 1 & 2: 0
1. LITTLE INTEREST OR PLEASURE IN DOING THINGS: 0
SUM OF ALL RESPONSES TO PHQ QUESTIONS 1-9: 0

## 2023-01-09 ASSESSMENT — SOCIAL DETERMINANTS OF HEALTH (SDOH): HOW HARD IS IT FOR YOU TO PAY FOR THE VERY BASICS LIKE FOOD, HOUSING, MEDICAL CARE, AND HEATING?: NOT HARD AT ALL

## 2023-01-09 NOTE — PROGRESS NOTES
Medicare Annual Wellness Visit    Imtiaz Campuzano is here for Medicare AWV (Discuss medication /)    Assessment & Plan   Medicare annual wellness visit, subsequent  Sinobronchitis  -     azelastine (ASTELIN) 0.1 % nasal spray; 2 sprays by Nasal route 2 times daily 2 Spray in each nostril, Disp-2 each, R-0Normal  Acute pain of right shoulder  -     methylPREDNISolone (MEDROL DOSEPACK) 4 MG tablet; Take by mouth., Disp-21 tablet, R-0Normal  B12 deficiency  -     Vitamin B12 & Folate; Future  Prediabetes  -     Hemoglobin A1C; Future  -     Comprehensive Metabolic Panel; Future  Hypothyroidism, unspecified type  -     TSH with Reflex; Future    Recommendations for Preventive Services Due: see orders and patient instructions/AVS.  Recommended screening schedule for the next 5-10 years is provided to the patient in written form: see Patient Instructions/AVS.     Return chronic conditions- 30 min, for Medicare Annual Wellness Visit in 1 year. Subjective   The following acute and/or chronic problems were also addressed today: Ok with the generic dexilant. Had hearing checked within the last year. Some residual issues. GI- has appt in April for another colonoscopy/endoscopy. Anxiety- lexapro has helped. Sleep is some improved. Able to fall asleep better when waking up. Patient's complete Health Risk Assessment and screening values have been reviewed and are found in Flowsheets. The following problems were reviewed today and where indicated follow up appointments were made and/or referrals ordered. Positive Risk Factor Screenings with Interventions:              Objective   Vitals:    01/09/23 1033   BP: 122/62   Pulse: 76   SpO2: 98%   Weight: 175 lb (79.4 kg)   Height: 5' 9\" (1.753 m)      Body mass index is 25.84 kg/m². General Appearance: alert and oriented to person, place and time, well developed and well- nourished, in no acute distress  Skin: warm and dry, no rash or erythema.  Pt has irregular nevus with small scaly area on the left leg with dark color  Head: normocephalic and atraumatic  Eyes: pupils equal, round, and reactive to light, extraocular eye movements intact, conjunctivae normal  ENT: tympanic membrane, external ear and ear canal normal bilaterally, nose without deformity, nasal mucosa and turbinates normal without polyps  Neck: supple and non-tender without mass, no thyromegaly or thyroid nodules, no cervical lymphadenopathy  Pulmonary/Chest: clear to auscultation bilaterally- no wheezes, rales or rhonchi, normal air movement, no respiratory distress  Cardiovascular: normal rate, regular rhythm, normal S1 and S2, no murmurs, rubs, clicks, or gallops, distal pulses intact, no carotid bruits  Abdomen: soft, non-tender, non-distended, normal bowel sounds, no masses or organomegaly  Extremities: no cyanosis, clubbing or edema  Musculoskeletal: normal range of motion, no joint swelling, deformity or tenderness  Neurologic: reflexes normal and symmetric, no cranial nerve deficit, gait, coordination and speech normal       Allergies   Allergen Reactions    Peanut (Diagnostic) Anaphylaxis     Asthma    Aspirin Other (See Comments)     Asthma  ASTHMA    Cat Hair Extract     Ceclor [Cefaclor] Hives    Dust Mite Extract     Sulfa Antibiotics Hives    Adhesive Tape Rash     Prior to Visit Medications    Medication Sig Taking? Authorizing Provider   azelastine (ASTELIN) 0.1 % nasal spray 2 sprays by Nasal route 2 times daily 2 Spray in each nostril Yes JENN Kirkpatrick CNP   methylPREDNISolone (MEDROL DOSEPACK) 4 MG tablet Take by mouth.  Yes JENN Kirkpatrick CNP   levothyroxine (SYNTHROID) 75 MCG tablet TAKE 1 TABLET DAILY Yes JENN Kirkpatrick CNP   escitalopram (LEXAPRO) 5 MG tablet TAKE 1 TABLET DAILY Yes JENN Kirkpatrick CNP   simvastatin (ZOCOR) 20 MG tablet TAKE 1 TABLET EVERY EVENING Yes JENN Kirkpatrick CNP   montelukast (SINGULAIR) 10 MG tablet TAKE 1 TABLET NIGHTLY Yes JENN Tinoco CNP   fluticasone (FLONASE) 50 MCG/ACT nasal spray 2 sprays by Nasal route daily Yes JENN Tinoco CNP   DEXILANT 60 MG CPDR delayed release capsule Take 1 capsule by mouth daily Yes JENN Tinoco CNP   betamethasone, augmented, (DIPROLENE) 0.05 % lotion Apply topically 2 times daily. Yes JENN Tinoco CNP   clobetasol (TEMOVATE) 0.05 % cream Apply topically 2 times daily prn Monday through Friday only, take weekends off Yes JENN Tinoco CNP   hydrOXYzine (VISTARIL) 25 MG capsule Take 1 capsule by mouth nightly as needed for Anxiety Yes JENN Tinoco CNP   albuterol sulfate (PROAIR RESPICLICK) 901 (90 BASE) MCG/ACT aerosol powder inhalation Inhale 2 puffs into the lungs every 4 hours as needed for Wheezing or Shortness of Breath Yes JENN Tinoco CNP   magnesium oxide (MAG-OX) 400 MG tablet Take 1 tablet by mouth daily. Yes Everton FERNANDEZ Holiday, DO   aspirin 325 MG tablet Take 325 mg by mouth 2 times daily. Yes Historical Provider, MD   cetirizine (ZYRTEC) 10 MG tablet Take 10 mg by mouth nightly.    Yes Historical Provider, MD   calcium carbonate (OSCAL) 500 MG TABS tablet Take 500 mg by mouth daily With D, K ZINC Yes Historical Provider, MD Patel (Including outside providers/suppliers regularly involved in providing care):   Patient Care Team:  JENN Tinoco CNP as PCP - General (Family Nurse Practitioner)  JENN Tinoco CNP as PCP - [facility] Empaneled Provider     Reviewed and updated this visit:  Tobacco  Allergies  Meds  Med Hx  Surg Hx  Soc Hx  Fam Hx

## 2023-01-09 NOTE — PATIENT INSTRUCTIONS
Advance Directives: Care Instructions  Overview  An advance directive is a legal way to state your wishes at the end of your life. It tells your family and your doctor what to do if you can't say what you want. There are two main types of advance directives. You can change them any time your wishes change. Living will. This form tells your family and your doctor your wishes about life support and other treatment. The form is also called a declaration. Medical power of . This form lets you name a person to make treatment decisions for you when you can't speak for yourself. This person is called a health care agent (health care proxy, health care surrogate). The form is also called a durable power of  for health care. If you do not have an advance directive, decisions about your medical care may be made by a family member, or by a doctor or a  who doesn't know you. It may help to think of an advance directive as a gift to the people who care for you. If you have one, they won't have to make tough decisions by themselves. For more information, including forms for your state, see the [address] website (www.caringinfo.org/planning/advance-directives/). Follow-up care is a key part of your treatment and safety. Be sure to make and go to all appointments, and call your doctor if you are having problems. It's also a good idea to know your test results and keep a list of the medicines you take. What should you include in an advance directive? Many states have a unique advance directive form. (It may ask you to address specific issues.) Or you might use a universal form that's approved by many states. If your form doesn't tell you what to address, it may be hard to know what to include in your advance directive. Use the questions below to help you get started. Who do you want to make decisions about your medical care if you are not able to?   What life-support measures do you want if you have a serious illness that gets worse over time or can't be cured? What are you most afraid of that might happen? (Maybe you're afraid of having pain, losing your independence, or being kept alive by machines.)  Where would you prefer to die? (Your home? A hospital? A nursing home?)  Do you want to donate your organs when you die? Do you want certain Faith practices performed before you die? When should you call for help? Be sure to contact your doctor if you have any questions. Where can you learn more? Go to http://www.azevedo.com/ and enter R264 to learn more about \"Advance Directives: Care Instructions. \"  Current as of: June 16, 2022               Content Version: 13.5  © 2121-4160 [employer]wise, [employer]orporated. Care instructions adapted under license by [facility] [place] ([facility]). If you have questions about a medical condition or this instruction, always ask your healthcare professional. [place] [address] any warranty or liability for your use of this information. Personalized Preventive Plan for Parris Stout - 1/9/2023  Medicare offers a range of preventive health benefits. Some of the tests and screenings are paid in full while other may be subject to a deductible, co-insurance, and/or copay. Some of these benefits include a comprehensive review of your medical history including lifestyle, illnesses that may run in your family, and various assessments and screenings as appropriate. After reviewing your medical record and screening and assessments performed today your provider may have ordered immunizations, labs, imaging, and/or referrals for you. A list of these orders (if applicable) as well as your Preventive Care list are included within your After Visit Summary for your review.     Other Preventive Recommendations:    A preventive eye exam performed by an eye specialist is recommended every 1-2 years to screen for glaucoma; cataracts, macular degeneration, and other eye disorders. A preventive dental visit is recommended every 6 months. Try to get at least 150 minutes of exercise per week or 10,000 steps per day on a pedometer . Order or download the FREE \"Exercise & Physical Activity: Your Everyday Guide\" from The [employer]. Call 3-407.989.2436 or search [employer] online. You need 6576-6798 mg of calcium and 2552-0255 IU of vitamin D per day. It is possible to meet your calcium requirement with diet alone, but a vitamin D supplement is usually necessary to meet this goal.  When exposed to the sun, use a sunscreen that protects against both UVA and UVB radiation with an SPF of 30 or greater. Reapply every 2 to 3 hours or after sweating, drying off with a towel, or swimming. Always wear a seat belt when traveling in a car. Always wear a helmet when riding a bicycle or motorcycle.

## 2023-01-09 NOTE — TELEPHONE ENCOUNTER
Pt called because she had an appt today but was unsure of where to send scripts.  All scripts need to be sent to [facility] # is [phone]

## 2023-01-09 NOTE — TELEPHONE ENCOUNTER
Pt would like the 3 month supplies scripts to be sent to [facility] in [place] instead of a mail in.

## 2023-01-10 LAB
FOLATE: 12.6 NG/ML
VITAMIN B-12: 1273 PG/ML (ref 232–1245)

## 2023-01-10 RX ORDER — LEVOTHYROXINE SODIUM 0.07 MG/1
TABLET ORAL
Qty: 90 TABLET | Refills: 1 | Status: SHIPPED | OUTPATIENT
Start: 2023-01-10

## 2023-01-10 RX ORDER — FLUTICASONE PROPIONATE 50 MCG
2 SPRAY, SUSPENSION (ML) NASAL DAILY
Qty: 3 EACH | Refills: 1 | Status: SHIPPED | OUTPATIENT
Start: 2023-01-10

## 2023-01-10 RX ORDER — ESCITALOPRAM OXALATE 5 MG/1
TABLET ORAL
Qty: 90 TABLET | Refills: 1 | Status: SHIPPED | OUTPATIENT
Start: 2023-01-10

## 2023-01-10 RX ORDER — DEXLANSOPRAZOLE 60 MG/1
60 CAPSULE, DELAYED RELEASE ORAL DAILY
Qty: 90 CAPSULE | Refills: 1 | Status: SHIPPED | OUTPATIENT
Start: 2023-01-10

## 2023-01-10 RX ORDER — MONTELUKAST SODIUM 10 MG/1
TABLET ORAL
Qty: 90 TABLET | Refills: 1 | Status: SHIPPED | OUTPATIENT
Start: 2023-01-10

## 2023-01-10 RX ORDER — HYDROXYZINE PAMOATE 25 MG/1
25 CAPSULE ORAL NIGHTLY PRN
Qty: 30 CAPSULE | Refills: 2 | Status: SHIPPED | OUTPATIENT
Start: 2023-01-10

## 2023-01-10 RX ORDER — SIMVASTATIN 20 MG
TABLET ORAL
Qty: 90 TABLET | Refills: 1 | Status: SHIPPED | OUTPATIENT
Start: 2023-01-10

## 2023-01-17 ENCOUNTER — OFFICE VISIT (OUTPATIENT)
Dept: FAMILY MEDICINE CLINIC | Age: 73
End: 2023-01-17

## 2023-01-17 VITALS — BODY MASS INDEX: 25.92 KG/M2 | TEMPERATURE: 97.8 F | HEIGHT: 69 IN | WEIGHT: 175 LBS

## 2023-01-17 DIAGNOSIS — D18.01 CHERRY ANGIOMA: ICD-10-CM

## 2023-01-17 DIAGNOSIS — L82.1 SEBORRHEIC KERATOSES: Primary | ICD-10-CM

## 2023-01-17 DIAGNOSIS — L30.9 ECZEMA, UNSPECIFIED TYPE: ICD-10-CM

## 2023-01-17 DIAGNOSIS — H57.89 EYE SWELLING: ICD-10-CM

## 2023-01-17 ASSESSMENT — ENCOUNTER SYMPTOMS: COLOR CHANGE: 1

## 2023-01-17 NOTE — PROGRESS NOTES
Diagnosis Orders   1. Seborrheic keratoses        2. Cherry angioma        3. Eye swelling        4. Eczema, unspecified type          Return in about 1 year (around 1/17/2024) for 15 min skin check. Patient Instructions   Caffeine containing eye creams can be helpful with swelling surrounding the eyes    Consider resuming nasal steroids to decrease any contribution that congestion has to the swelling under the eyes. Information provided for seborrheic keratoses. Benign lesions. No treatment needed. Subjective:      Patient ID: Cici Sylvester is a 71 y.o. female who presents for:  Chief Complaint   Patient presents with    Skin Exam     Initial encounter    Left knee, back left shoulder, & face        Multiple skin changes noted. Denies history of skin cancer. History of eczema that flares intermittently but is well controlled with the steroid cream she has at home. Puffiness under her eyes. History of sinus surgery. History of significant allergies including aspirin desensitization      Current Outpatient Medications on File Prior to Visit   Medication Sig Dispense Refill    dexlansoprazole (DEXILANT) 60 MG CPDR delayed release capsule Take 1 capsule by mouth daily 90 capsule 1    escitalopram (LEXAPRO) 5 MG tablet TAKE 1 TABLET DAILY 90 tablet 1    hydrOXYzine pamoate (VISTARIL) 25 MG capsule Take 1 capsule by mouth nightly as needed for Anxiety 30 capsule 2    levothyroxine (SYNTHROID) 75 MCG tablet TAKE 1 TABLET DAILY 90 tablet 1    montelukast (SINGULAIR) 10 MG tablet TAKE 1 TABLET NIGHTLY 90 tablet 1    simvastatin (ZOCOR) 20 MG tablet TAKE 1 TABLET EVERY EVENING 90 tablet 1    fluticasone (FLONASE) 50 MCG/ACT nasal spray 2 sprays by Nasal route daily 3 each 1    azelastine (ASTELIN) 0.1 % nasal spray 2 sprays by Nasal route 2 times daily 2 Spray in each nostril 2 each 0    betamethasone, augmented, (DIPROLENE) 0.05 % lotion Apply topically 2 times daily.  60 mL 3    clobetasol (TEMOVATE) 0.05 % cream Apply topically 2 times daily prn Monday through Friday only, take weekends off 60 g 2    albuterol sulfate (PROAIR RESPICLICK) 123 (90 BASE) MCG/ACT aerosol powder inhalation Inhale 2 puffs into the lungs every 4 hours as needed for Wheezing or Shortness of Breath 1 Inhaler 0    magnesium oxide (MAG-OX) 400 MG tablet Take 1 tablet by mouth daily. 30 tablet 6    aspirin 325 MG tablet Take 325 mg by mouth 2 times daily. cetirizine (ZYRTEC) 10 MG tablet Take 10 mg by mouth nightly. calcium carbonate (OSCAL) 500 MG TABS tablet Take 500 mg by mouth daily With D, K ZINC       No current facility-administered medications on file prior to visit.      Past Medical History:   Diagnosis Date    Allergic rhinitis     Asthma     Dizziness     Dr. Cipriano Mi    DJD (degenerative joint disease)     Eczema     GERD (gastroesophageal reflux disease)     Hashimoto's disease     History of chronic sinusitis     Hx of colonic polyps     BENIGN    Hx of thyroid nodule     biospy benign    Hyperlipidemia     Lumbago     Mild tricuspid regurgitation 2014    echo    Normal cardiac stress test 2014    POTS (postural orthostatic tachycardia syndrome)     Dr. Cipriano Mi    Prediabetes     Trigger finger of right hand     4th and 5th digits     Past Surgical History:   Procedure Laterality Date    BREAST SURGERY Left -LEFT    FIBROID TUMOR LUMPECTOMY     SECTION      COLONOSCOPY  ,,2010    GUM SURGERY      GUM IMPLANT    MOUTH SURGERY  -POLYP    NASAL POLYP SURGERY  1986    KILLIAN-YULIYA POLYPS AND WINDOWS, TURBINATES TRIMMED    NASAL POLYP SURGERY  FESS BILAT FISTULA REPAIR    SINUS SURGERY      TONSILLECTOMY AND ADENOIDECTOMY  1961     Social History     Socioeconomic History    Marital status:      Spouse name: Not on file    Number of children: Not on file    Years of education: Not on file    Highest education level: Not on file   Occupational History    Not on file Tobacco Use    Smoking status: Former     Packs/day: 0.33     Years: 5.00     Pack years: 1.65     Types: Cigarettes     Quit date: 1976     Years since quittin.1    Smokeless tobacco: Never   Substance and Sexual Activity    Alcohol use: Yes     Alcohol/week: 0.0 standard drinks     Comment: occasionally    Drug use: No    Sexual activity: Not Currently   Other Topics Concern    Not on file   Social History Narrative    Not on file     Social Determinants of Health     Financial Resource Strain: Low Risk     Difficulty of Paying Living Expenses: Not hard at all   Food Insecurity: No Food Insecurity    Worried About Running Out of Food in the Last Year: Never true    Ran Out of Food in the Last Year: Never true   Transportation Needs: Not on file   Physical Activity: Sufficiently Active    Days of Exercise per Week: 7 days    Minutes of Exercise per Session: 60 min   Stress: Not on file   Social Connections: Not on file   Intimate Partner Violence: Not on file   Housing Stability: Not on file     Family History   Problem Relation Age of Onset    Diabetes Father     Thyroid Disease Father     Thyroid Disease Sister     Diabetes Sister     Cancer Other     Diabetes Other     Heart Disease Other     Breast Cancer Maternal Aunt      Allergies:  Peanut (diagnostic), Aspirin, Cat hair extract, Ceclor [cefaclor], Dust mite extract, Sulfa antibiotics, and Adhesive tape    Review of Systems   Constitutional:  Negative for chills and fever. Skin:  Positive for color change. Allergic/Immunologic: Negative for environmental allergies, food allergies and immunocompromised state. Hematological:  Negative for adenopathy. Does not bruise/bleed easily. Psychiatric/Behavioral:  Negative for behavioral problems and sleep disturbance.       Objective:   Temp 97.8 °F (36.6 °C)   Ht 5' 9\" (1.753 m)   Wt 175 lb (79.4 kg)   LMP 2001   BMI 25.84 kg/m²     Physical Exam  Constitutional:       General: She is not in acute distress. Appearance: Normal appearance. She is well-developed. She is not toxic-appearing. HENT:      Head: Normocephalic and atraumatic. Right Ear: Hearing and tympanic membrane normal.      Left Ear: Hearing and tympanic membrane normal.      Nose: Nose normal. No nasal deformity. Eyes:      General: Lids are normal.         Right eye: No discharge. Left eye: No discharge. Conjunctiva/sclera: Conjunctivae normal.      Pupils: Pupils are equal, round, and reactive to light. Neck:      Thyroid: No thyroid mass or thyromegaly. Vascular: No JVD. Trachea: Trachea and phonation normal.   Cardiovascular:      Rate and Rhythm: Normal rate and regular rhythm. Pulmonary:      Effort: No accessory muscle usage or respiratory distress. Musculoskeletal:      Cervical back: Full passive range of motion without pain. Comments: FROM all large muscle groups and joints as witnessed when walking to exam table, getting on, and getting off the exam table. Skin:     General: Skin is warm and dry. Findings: No rash. Comments: Multiple 1 to 2 mm purple or cherry colored lesions throughout the torso. Infrequently noted soft brown flake stuck on appearing lesions throughout the body. Infraorbital edema noted   Neurological:      Mental Status: She is alert. Motor: No tremor or atrophy. Gait: Gait normal.   Psychiatric:         Speech: Speech normal.         Behavior: Behavior normal.         Thought Content: Thought content normal.       No results found for this visit on 01/17/23.     Recent Results (from the past 2016 hour(s))   Comprehensive Metabolic Panel    Collection Time: 01/09/23 11:32 AM   Result Value Ref Range    Sodium 140 135 - 144 mEq/L    Potassium 4.9 3.4 - 4.9 mEq/L    Chloride 100 95 - 107 mEq/L    CO2 29 20 - 31 mEq/L    Anion Gap 11 9 - 15 mEq/L    Glucose 96 70 - 99 mg/dL    BUN 19 8 - 23 mg/dL    Creatinine 0.82 0.50 - 0.90 mg/dL Est, Glom Filt Rate >60.0 >60    Calcium 9.4 8.5 - 9.9 mg/dL    Total Protein 7.2 6.3 - 8.0 g/dL    Albumin 4.2 3.5 - 4.6 g/dL    Total Bilirubin 0.3 0.2 - 0.7 mg/dL    Alkaline Phosphatase 70 40 - 130 U/L    ALT 21 0 - 33 U/L    AST 21 0 - 35 U/L    Globulin 3.0 2.3 - 3.5 g/dL   Hemoglobin A1C    Collection Time: 01/09/23 11:32 AM   Result Value Ref Range    Hemoglobin A1C 5.9 4.8 - 5.9 %   TSH with Reflex    Collection Time: 01/09/23 11:32 AM   Result Value Ref Range    TSH 1.440 0.440 - 3.860 uIU/mL   Vitamin B12 & Folate    Collection Time: 01/09/23 11:32 AM   Result Value Ref Range    Vitamin B-12 1273 (H) 232 - 1245 pg/mL    Folate 12.6 >4.8 ng/mL       [] Pt was seen by provider for      Minutes  Counseling and coordination of care was done for all assessment diagnosis listed for today with patient and any family/friend present. More than 50% of this visit was spent coordinating current care, obtaining information for prior records, and counseling for current plan of action. Assessment:       Diagnosis Orders   1. Seborrheic keratoses        2. Cherry angioma        3. Eye swelling        4. Eczema, unspecified type              No orders of the defined types were placed in this encounter. No orders of the defined types were placed in this encounter. Medication List            Accurate as of January 17, 2023  2:46 PM. If you have any questions, ask your nurse or doctor. CONTINUE taking these medications      albuterol sulfate 108 (90 Base) MCG/ACT aerosol powder inhalation  Commonly known as: ProAir RespiClick  Inhale 2 puffs into the lungs every 4 hours as needed for Wheezing or Shortness of Breath     aspirin 325 MG tablet     azelastine 0.1 % nasal spray  Commonly known as: ASTELIN  2 sprays by Nasal route 2 times daily 2 Spray in each nostril     betamethasone (augmented) 0.05 % lotion  Commonly known as: DIPROLENE  Apply topically 2 times daily.      calcium carbonate 500 MG Tabs tablet  Commonly known as: OSCAL     cetirizine 10 MG tablet  Commonly known as: ZYRTEC     clobetasol 0.05 % cream  Commonly known as: TEMOVATE  Apply topically 2 times daily prn Monday through Friday only, take weekends off     dexlansoprazole 60 MG Cpdr delayed release capsule  Commonly known as: Dexilant  Take 1 capsule by mouth daily     escitalopram 5 MG tablet  Commonly known as: LEXAPRO  TAKE 1 TABLET DAILY     fluticasone 50 MCG/ACT nasal spray  Commonly known as: FLONASE  2 sprays by Nasal route daily     hydrOXYzine pamoate 25 MG capsule  Commonly known as: VISTARIL  Take 1 capsule by mouth nightly as needed for Anxiety     levothyroxine 75 MCG tablet  Commonly known as: SYNTHROID  TAKE 1 TABLET DAILY     magnesium oxide 400 MG tablet  Commonly known as: MAG-OX  Take 1 tablet by mouth daily. montelukast 10 MG tablet  Commonly known as: SINGULAIR  TAKE 1 TABLET NIGHTLY     simvastatin 20 MG tablet  Commonly known as: ZOCOR  TAKE 1 TABLET EVERY EVENING                Plan:   Return in about 1 year (around 1/17/2024) for 15 min skin check. Patient Instructions   Caffeine containing eye creams can be helpful with swelling surrounding the eyes    Consider resuming nasal steroids to decrease any contribution that congestion has to the swelling under the eyes. Information provided for seborrheic keratoses. Benign lesions. No treatment needed. This note was partially created with the assistance of dictation. This may lead to grammatical or spelling errors. Desmond Zepeda M.D.

## 2023-01-17 NOTE — PATIENT INSTRUCTIONS
Caffeine containing eye creams can be helpful with swelling surrounding the eyes    Consider resuming nasal steroids to decrease any contribution that congestion has to the swelling under the eyes. Information provided for seborrheic keratoses. Benign lesions. No treatment needed.

## 2023-03-28 ENCOUNTER — TELEPHONE (OUTPATIENT)
Dept: FAMILY MEDICINE CLINIC | Age: 73
End: 2023-03-28

## 2023-03-28 DIAGNOSIS — U07.1 COVID-19: Primary | ICD-10-CM

## 2023-07-10 ENCOUNTER — OFFICE VISIT (OUTPATIENT)
Dept: FAMILY MEDICINE CLINIC | Age: 73
End: 2023-07-10
Payer: MEDICARE

## 2023-07-10 VITALS
WEIGHT: 179 LBS | HEART RATE: 76 BPM | DIASTOLIC BLOOD PRESSURE: 64 MMHG | BODY MASS INDEX: 26.51 KG/M2 | OXYGEN SATURATION: 96 % | SYSTOLIC BLOOD PRESSURE: 112 MMHG | HEIGHT: 69 IN

## 2023-07-10 DIAGNOSIS — R73.03 PREDIABETES: ICD-10-CM

## 2023-07-10 DIAGNOSIS — E53.8 B12 DEFICIENCY: ICD-10-CM

## 2023-07-10 DIAGNOSIS — Z12.31 OTHER SCREENING MAMMOGRAM: ICD-10-CM

## 2023-07-10 DIAGNOSIS — F32.A ANXIETY AND DEPRESSION: ICD-10-CM

## 2023-07-10 DIAGNOSIS — E03.9 HYPOTHYROIDISM, UNSPECIFIED TYPE: ICD-10-CM

## 2023-07-10 DIAGNOSIS — E78.5 HYPERLIPIDEMIA, UNSPECIFIED HYPERLIPIDEMIA TYPE: Primary | ICD-10-CM

## 2023-07-10 DIAGNOSIS — J30.9 ALLERGIC RHINITIS, UNSPECIFIED SEASONALITY, UNSPECIFIED TRIGGER: ICD-10-CM

## 2023-07-10 DIAGNOSIS — F41.9 ANXIETY AND DEPRESSION: ICD-10-CM

## 2023-07-10 PROCEDURE — 1123F ACP DISCUSS/DSCN MKR DOCD: CPT | Performed by: NURSE PRACTITIONER

## 2023-07-10 PROCEDURE — 1036F TOBACCO NON-USER: CPT | Performed by: NURSE PRACTITIONER

## 2023-07-10 PROCEDURE — 99214 OFFICE O/P EST MOD 30 MIN: CPT | Performed by: NURSE PRACTITIONER

## 2023-07-10 PROCEDURE — G8399 PT W/DXA RESULTS DOCUMENT: HCPCS | Performed by: NURSE PRACTITIONER

## 2023-07-10 PROCEDURE — G8427 DOCREV CUR MEDS BY ELIG CLIN: HCPCS | Performed by: NURSE PRACTITIONER

## 2023-07-10 PROCEDURE — G8417 CALC BMI ABV UP PARAM F/U: HCPCS | Performed by: NURSE PRACTITIONER

## 2023-07-10 PROCEDURE — 3017F COLORECTAL CA SCREEN DOC REV: CPT | Performed by: NURSE PRACTITIONER

## 2023-07-10 PROCEDURE — 1090F PRES/ABSN URINE INCON ASSESS: CPT | Performed by: NURSE PRACTITIONER

## 2023-07-10 RX ORDER — LEVOTHYROXINE SODIUM 0.07 MG/1
TABLET ORAL
Qty: 90 TABLET | Refills: 1 | Status: SHIPPED | OUTPATIENT
Start: 2023-07-10

## 2023-07-10 RX ORDER — DEXLANSOPRAZOLE 60 MG/1
60 CAPSULE, DELAYED RELEASE ORAL DAILY
Qty: 90 CAPSULE | Refills: 1 | Status: SHIPPED | OUTPATIENT
Start: 2023-07-10

## 2023-07-10 RX ORDER — MONTELUKAST SODIUM 10 MG/1
TABLET ORAL
Qty: 90 TABLET | Refills: 1 | Status: SHIPPED | OUTPATIENT
Start: 2023-07-10

## 2023-07-10 RX ORDER — SIMVASTATIN 20 MG
TABLET ORAL
Qty: 90 TABLET | Refills: 1 | Status: SHIPPED | OUTPATIENT
Start: 2023-07-10

## 2023-07-10 RX ORDER — ESCITALOPRAM OXALATE 5 MG/1
TABLET ORAL
Qty: 90 TABLET | Refills: 1 | Status: SHIPPED | OUTPATIENT
Start: 2023-07-10

## 2023-07-10 RX ORDER — DOCUSATE SODIUM 100 MG/1
100 CAPSULE, LIQUID FILLED ORAL 2 TIMES DAILY
COMMUNITY
Start: 2023-04-11

## 2023-07-10 SDOH — ECONOMIC STABILITY: HOUSING INSECURITY
IN THE LAST 12 MONTHS, WAS THERE A TIME WHEN YOU DID NOT HAVE A STEADY PLACE TO SLEEP OR SLEPT IN A SHELTER (INCLUDING NOW)?: NO

## 2023-07-10 SDOH — ECONOMIC STABILITY: FOOD INSECURITY: WITHIN THE PAST 12 MONTHS, YOU WORRIED THAT YOUR FOOD WOULD RUN OUT BEFORE YOU GOT MONEY TO BUY MORE.: NEVER TRUE

## 2023-07-10 SDOH — ECONOMIC STABILITY: INCOME INSECURITY: HOW HARD IS IT FOR YOU TO PAY FOR THE VERY BASICS LIKE FOOD, HOUSING, MEDICAL CARE, AND HEATING?: NOT HARD AT ALL

## 2023-07-10 SDOH — ECONOMIC STABILITY: FOOD INSECURITY: WITHIN THE PAST 12 MONTHS, THE FOOD YOU BOUGHT JUST DIDN'T LAST AND YOU DIDN'T HAVE MONEY TO GET MORE.: NEVER TRUE

## 2023-07-10 ASSESSMENT — ENCOUNTER SYMPTOMS
DIARRHEA: 0
COUGH: 0
SHORTNESS OF BREATH: 0
CONSTIPATION: 0

## 2023-07-10 NOTE — PROGRESS NOTES
Subjective  Chief Complaint   Patient presents with    Follow-up Chronic Condition       HPI    Still having some puffiness under eyes. Has spoken with dermatologist    Still struggling with left ear fullness. No pain. Colonoscopy- had one polyp- benign. Going to be doing a lot of traveling starting in a couple of weeks. 50th wedding anniversary.  cruise in October. Sleeping better with the lexapro     Struggling with shoulder pain. Planning on seeing chiropractor. Better but not great. Lab Results   Component Value Date    WBC 5.2 07/06/2022    HGB 12.9 07/06/2022    HCT 39.3 07/06/2022     07/06/2022    CHOL 196 07/06/2022    TRIG 92 07/06/2022    HDL 70 (H) 07/06/2022    ALT 21 01/09/2023    AST 21 01/09/2023     01/09/2023    K 4.9 01/09/2023     01/09/2023    CREATININE 0.82 01/09/2023    BUN 19 01/09/2023    CO2 29 01/09/2023    TSH 2.860 01/10/2019    INR 1.1 06/19/2020    LABA1C 5.9 01/09/2023     Following with eye DrMary Ann For macular degeneration    Allergist- following.       Patient Active Problem List    Diagnosis Date Noted    Lumbosacral spondylosis without myelopathy 08/11/2017    Osteoarthritis of spine with radiculopathy, lumbar region 08/10/2017    Hashimoto's disease     Prediabetes     POTS (postural orthostatic tachycardia syndrome)     Trigger finger of right hand     Palpitations 12/12/2014    Hypothyroidism 08/25/2014    GERD (gastroesophageal reflux disease) 08/25/2014    Allergic rhinitis 08/25/2014    Asthma 08/25/2014    Osteoarthritis 08/25/2014    Lumbago 08/25/2014    Hyperlipidemia 08/25/2014     Past Medical History:   Diagnosis Date    Allergic rhinitis     Asthma     Dizziness     Dr. Lindy HAGER (degenerative joint disease)     Eczema     GERD (gastroesophageal reflux disease)     Hashimoto's disease     History of chronic sinusitis     Hx of colonic polyps     BENIGN    Hx of thyroid nodule     biospy benign    Hyperlipidemia     Lumbago

## 2023-07-12 DIAGNOSIS — E78.5 HYPERLIPIDEMIA, UNSPECIFIED HYPERLIPIDEMIA TYPE: ICD-10-CM

## 2023-07-12 DIAGNOSIS — R73.03 PREDIABETES: ICD-10-CM

## 2023-07-12 DIAGNOSIS — E03.9 HYPOTHYROIDISM, UNSPECIFIED TYPE: ICD-10-CM

## 2023-07-12 DIAGNOSIS — E53.8 B12 DEFICIENCY: ICD-10-CM

## 2023-07-12 LAB
ALBUMIN SERPL-MCNC: 4.6 G/DL (ref 3.5–4.6)
ALP SERPL-CCNC: 64 U/L (ref 40–130)
ALT SERPL-CCNC: 14 U/L (ref 0–33)
ANION GAP SERPL CALCULATED.3IONS-SCNC: 14 MEQ/L (ref 9–15)
AST SERPL-CCNC: 22 U/L (ref 0–35)
BASOPHILS # BLD: 0.1 K/UL (ref 0–0.2)
BASOPHILS NFR BLD: 2.2 %
BILIRUB SERPL-MCNC: 0.4 MG/DL (ref 0.2–0.7)
BUN SERPL-MCNC: 19 MG/DL (ref 8–23)
CALCIUM SERPL-MCNC: 9.7 MG/DL (ref 8.5–9.9)
CHLORIDE SERPL-SCNC: 101 MEQ/L (ref 95–107)
CHOLEST SERPL-MCNC: 182 MG/DL (ref 0–199)
CO2 SERPL-SCNC: 26 MEQ/L (ref 20–31)
CREAT SERPL-MCNC: 0.77 MG/DL (ref 0.5–0.9)
EOSINOPHIL # BLD: 0.4 K/UL (ref 0–0.7)
EOSINOPHIL NFR BLD: 9 %
ERYTHROCYTE [DISTWIDTH] IN BLOOD BY AUTOMATED COUNT: 13.7 % (ref 11.5–14.5)
GLOBULIN SER CALC-MCNC: 2.8 G/DL (ref 2.3–3.5)
GLUCOSE SERPL-MCNC: 95 MG/DL (ref 70–99)
HBA1C MFR BLD: 6 % (ref 4.8–5.9)
HCT VFR BLD AUTO: 41.3 % (ref 37–47)
HDLC SERPL-MCNC: 72 MG/DL (ref 40–59)
HGB BLD-MCNC: 13.5 G/DL (ref 12–16)
LDLC SERPL CALC-MCNC: 98 MG/DL (ref 0–129)
LYMPHOCYTES # BLD: 1.2 K/UL (ref 1–4.8)
LYMPHOCYTES NFR BLD: 27.4 %
MCH RBC QN AUTO: 30.4 PG (ref 27–31.3)
MCHC RBC AUTO-ENTMCNC: 32.6 % (ref 33–37)
MCV RBC AUTO: 93.1 FL (ref 79.4–94.8)
MONOCYTES # BLD: 0.5 K/UL (ref 0.2–0.8)
MONOCYTES NFR BLD: 10.7 %
NEUTROPHILS # BLD: 2.2 K/UL (ref 1.4–6.5)
NEUTS SEG NFR BLD: 50.7 %
PLATELET # BLD AUTO: 302 K/UL (ref 130–400)
POTASSIUM SERPL-SCNC: 5.1 MEQ/L (ref 3.4–4.9)
PROT SERPL-MCNC: 7.4 G/DL (ref 6.3–8)
RBC # BLD AUTO: 4.43 M/UL (ref 4.2–5.4)
SODIUM SERPL-SCNC: 141 MEQ/L (ref 135–144)
TRIGL SERPL-MCNC: 61 MG/DL (ref 0–150)
TSH REFLEX: 2.12 UIU/ML (ref 0.44–3.86)
WBC # BLD AUTO: 4.3 K/UL (ref 4.8–10.8)

## 2023-07-14 LAB
FOLATE: 18.1 NG/ML
VITAMIN B-12: 431 PG/ML (ref 232–1245)

## 2023-08-09 ENCOUNTER — HOSPITAL ENCOUNTER (OUTPATIENT)
Dept: WOMENS IMAGING | Age: 73
Discharge: HOME | End: 2023-08-11
Payer: MEDICARE

## 2023-08-09 DIAGNOSIS — Z12.31 OTHER SCREENING MAMMOGRAM: ICD-10-CM

## 2023-08-09 PROCEDURE — 77063 BREAST TOMOSYNTHESIS BI: CPT

## 2023-10-31 NOTE — TELEPHONE ENCOUNTER
How Severe Is Your Skin Lesion?: mild Patient returned to unit with rn. He remains alert and oriented.       Primo Santiago, RN  02/09/23 2858 lov 1/21    Future ov 7/21 Have Your Skin Lesions Been Treated?: not been treated Is This A New Presentation, Or A Follow-Up?: Skin Lesions

## 2023-11-08 DIAGNOSIS — E78.5 HYPERLIPIDEMIA, UNSPECIFIED HYPERLIPIDEMIA TYPE: ICD-10-CM

## 2023-11-08 DIAGNOSIS — E03.9 HYPOTHYROIDISM, UNSPECIFIED TYPE: ICD-10-CM

## 2023-11-08 DIAGNOSIS — J30.9 ALLERGIC RHINITIS, UNSPECIFIED SEASONALITY, UNSPECIFIED TRIGGER: ICD-10-CM

## 2023-11-08 NOTE — TELEPHONE ENCOUNTER
Comments: please review     Last Office Visit (last PCP visit):   7/10/2023    Next Visit Date:  Future Appointments   Date Time Provider [address]   1/10/2024 10:30 AM JENN Todd CNP [facility] AT [place]   2024 10:30 AM Dara Flores MD [facility] EMERGENCY [facility] [place]       **If hasn't been seen in over a year OR hasn't followed up according to last diabetes/ADHD visit, make appointment for patient before sending refill to provider.     Rx requested:  Requested Prescriptions     Pending Prescriptions Disp Refills    fluticasone (FLONASE) 50 MCG/ACT nasal spray 3 each 1     Si sprays by Nasal route daily    simvastatin (ZOCOR) 20 MG tablet 90 tablet 1     Sig: TAKE 1 TABLET EVERY EVENING    montelukast (SINGULAIR) 10 MG tablet 90 tablet 1     Sig: TAKE 1 TABLET NIGHTLY    levothyroxine (SYNTHROID) 75 MCG tablet 90 tablet 1     Sig: TAKE 1 TABLET DAILY

## 2023-11-09 RX ORDER — FLUTICASONE PROPIONATE 50 MCG
2 SPRAY, SUSPENSION (ML) NASAL DAILY
Qty: 3 EACH | Refills: 1 | Status: SHIPPED | OUTPATIENT
Start: 2023-11-09

## 2023-11-09 RX ORDER — MONTELUKAST SODIUM 10 MG/1
TABLET ORAL
Qty: 90 TABLET | Refills: 1 | Status: SHIPPED | OUTPATIENT
Start: 2023-11-09

## 2023-11-09 RX ORDER — SIMVASTATIN 20 MG
TABLET ORAL
Qty: 90 TABLET | Refills: 1 | Status: SHIPPED | OUTPATIENT
Start: 2023-11-09

## 2023-11-09 RX ORDER — LEVOTHYROXINE SODIUM 0.07 MG/1
TABLET ORAL
Qty: 90 TABLET | Refills: 1 | Status: SHIPPED | OUTPATIENT
Start: 2023-11-09

## 2024-01-10 ENCOUNTER — OFFICE VISIT (OUTPATIENT)
Dept: FAMILY MEDICINE CLINIC | Age: 74
End: 2024-01-10

## 2024-01-10 ENCOUNTER — APPOINTMENT (OUTPATIENT)
Dept: AUDIOLOGY | Facility: CLINIC | Age: 74
End: 2024-01-10
Payer: MEDICARE

## 2024-01-10 VITALS
SYSTOLIC BLOOD PRESSURE: 124 MMHG | HEIGHT: 69 IN | BODY MASS INDEX: 27.25 KG/M2 | OXYGEN SATURATION: 98 % | HEART RATE: 82 BPM | WEIGHT: 184 LBS | DIASTOLIC BLOOD PRESSURE: 62 MMHG

## 2024-01-10 DIAGNOSIS — R73.03 BORDERLINE DIABETES: ICD-10-CM

## 2024-01-10 DIAGNOSIS — F41.9 ANXIETY AND DEPRESSION: ICD-10-CM

## 2024-01-10 DIAGNOSIS — E87.5 HYPERKALEMIA: ICD-10-CM

## 2024-01-10 DIAGNOSIS — F32.A ANXIETY AND DEPRESSION: ICD-10-CM

## 2024-01-10 DIAGNOSIS — Z00.00 MEDICARE ANNUAL WELLNESS VISIT, SUBSEQUENT: Primary | ICD-10-CM

## 2024-01-10 DIAGNOSIS — J30.9 ALLERGIC RHINITIS, UNSPECIFIED SEASONALITY, UNSPECIFIED TRIGGER: ICD-10-CM

## 2024-01-10 DIAGNOSIS — J45.20 MILD INTERMITTENT ASTHMA WITHOUT COMPLICATION: ICD-10-CM

## 2024-01-10 DIAGNOSIS — K21.9 GASTROESOPHAGEAL REFLUX DISEASE, UNSPECIFIED WHETHER ESOPHAGITIS PRESENT: ICD-10-CM

## 2024-01-10 LAB
ALBUMIN SERPL-MCNC: 4.4 G/DL (ref 3.5–4.6)
ALP SERPL-CCNC: 91 U/L (ref 40–130)
ALT SERPL-CCNC: 28 U/L (ref 0–33)
ANION GAP SERPL CALCULATED.3IONS-SCNC: 13 MEQ/L (ref 9–15)
AST SERPL-CCNC: 22 U/L (ref 0–35)
BILIRUB SERPL-MCNC: 1.4 MG/DL (ref 0.2–0.7)
BUN SERPL-MCNC: 16 MG/DL (ref 8–23)
CALCIUM SERPL-MCNC: 9.3 MG/DL (ref 8.5–9.9)
CHLORIDE SERPL-SCNC: 104 MEQ/L (ref 95–107)
CO2 SERPL-SCNC: 24 MEQ/L (ref 20–31)
CREAT SERPL-MCNC: 0.72 MG/DL (ref 0.5–0.9)
GLOBULIN SER CALC-MCNC: 2.5 G/DL (ref 2.3–3.5)
GLUCOSE SERPL-MCNC: 92 MG/DL (ref 70–99)
HBA1C MFR BLD: 5.9 % (ref 4.8–5.9)
POTASSIUM SERPL-SCNC: 4.3 MEQ/L (ref 3.4–4.9)
PROT SERPL-MCNC: 6.9 G/DL (ref 6.3–8)
SODIUM SERPL-SCNC: 141 MEQ/L (ref 135–144)

## 2024-01-10 RX ORDER — ESCITALOPRAM OXALATE 5 MG/1
TABLET ORAL
Qty: 90 TABLET | Refills: 1 | Status: SHIPPED | OUTPATIENT
Start: 2024-01-10

## 2024-01-10 RX ORDER — DEXLANSOPRAZOLE 60 MG/1
60 CAPSULE, DELAYED RELEASE ORAL DAILY
Qty: 90 CAPSULE | Refills: 1 | Status: SHIPPED | OUTPATIENT
Start: 2024-01-10

## 2024-01-10 RX ORDER — FLUTICASONE PROPIONATE 50 MCG
2 SPRAY, SUSPENSION (ML) NASAL DAILY
Qty: 3 EACH | Refills: 1 | Status: SHIPPED | OUTPATIENT
Start: 2024-01-10

## 2024-01-10 RX ORDER — ALBUTEROL SULFATE 90 UG/1
2 POWDER, METERED RESPIRATORY (INHALATION) EVERY 4 HOURS PRN
Qty: 1 EACH | Refills: 2 | Status: SHIPPED | OUTPATIENT
Start: 2024-01-10

## 2024-01-10 SDOH — HEALTH STABILITY: PHYSICAL HEALTH: ON AVERAGE, HOW MANY DAYS PER WEEK DO YOU ENGAGE IN MODERATE TO STRENUOUS EXERCISE (LIKE A BRISK WALK)?: 6 DAYS

## 2024-01-10 SDOH — HEALTH STABILITY: PHYSICAL HEALTH: ON AVERAGE, HOW MANY MINUTES DO YOU ENGAGE IN EXERCISE AT THIS LEVEL?: 40 MIN

## 2024-01-10 ASSESSMENT — PATIENT HEALTH QUESTIONNAIRE - PHQ9
4. FEELING TIRED OR HAVING LITTLE ENERGY: 0
3. TROUBLE FALLING OR STAYING ASLEEP: 0
2. FEELING DOWN, DEPRESSED OR HOPELESS: 0
SUM OF ALL RESPONSES TO PHQ9 QUESTIONS 1 & 2: 1
6. FEELING BAD ABOUT YOURSELF - OR THAT YOU ARE A FAILURE OR HAVE LET YOURSELF OR YOUR FAMILY DOWN: 0
8. MOVING OR SPEAKING SO SLOWLY THAT OTHER PEOPLE COULD HAVE NOTICED. OR THE OPPOSITE, BEING SO FIGETY OR RESTLESS THAT YOU HAVE BEEN MOVING AROUND A LOT MORE THAN USUAL: 0
SUM OF ALL RESPONSES TO PHQ QUESTIONS 1-9: 1
SUM OF ALL RESPONSES TO PHQ QUESTIONS 1-9: 1
1. LITTLE INTEREST OR PLEASURE IN DOING THINGS: 1
10. IF YOU CHECKED OFF ANY PROBLEMS, HOW DIFFICULT HAVE THESE PROBLEMS MADE IT FOR YOU TO DO YOUR WORK, TAKE CARE OF THINGS AT HOME, OR GET ALONG WITH OTHER PEOPLE: 0
7. TROUBLE CONCENTRATING ON THINGS, SUCH AS READING THE NEWSPAPER OR WATCHING TELEVISION: 0
5. POOR APPETITE OR OVEREATING: 0
9. THOUGHTS THAT YOU WOULD BE BETTER OFF DEAD, OR OF HURTING YOURSELF: 0
SUM OF ALL RESPONSES TO PHQ QUESTIONS 1-9: 1
SUM OF ALL RESPONSES TO PHQ QUESTIONS 1-9: 1

## 2024-01-10 ASSESSMENT — LIFESTYLE VARIABLES
HOW MANY STANDARD DRINKS CONTAINING ALCOHOL DO YOU HAVE ON A TYPICAL DAY: 1
HOW OFTEN DO YOU HAVE A DRINK CONTAINING ALCOHOL: 4
HOW OFTEN DO YOU HAVE A DRINK CONTAINING ALCOHOL: 2-3 TIMES A WEEK
HOW MANY STANDARD DRINKS CONTAINING ALCOHOL DO YOU HAVE ON A TYPICAL DAY: 1 OR 2
HOW OFTEN DO YOU HAVE SIX OR MORE DRINKS ON ONE OCCASION: 1

## 2024-01-10 NOTE — PROGRESS NOTES
Medicare Annual Wellness Visit    Juliette Peña is here for Medicare AWV (Annual /Would like to discuss bw done in July /Would like to discuss the use of her inhaler this past year /Pt is not allergic to sulfa anymore, did a sulfa challenge /Pt is wondering what you look for in order to be off Asprin /Discuss b12)    Assessment & Plan   Medicare annual wellness visit, subsequent  Anxiety and depression  -     escitalopram (LEXAPRO) 5 MG tablet; TAKE 1 TABLET DAILY, Disp-90 tablet, R-1Normal  Allergic rhinitis, unspecified seasonality, unspecified trigger  -     fluticasone (FLONASE) 50 MCG/ACT nasal spray; 2 sprays by Nasal route daily, Disp-3 each, R-1Normal  Mild intermittent asthma without complication  -     albuterol sulfate (PROAIR RESPICLICK) 108 (90 Base) MCG/ACT aerosol powder inhalation; Inhale 2 puffs into the lungs every 4 hours as needed for Wheezing or Shortness of Breath, Disp-1 each, R-2Normal  Borderline diabetes  -     Hemoglobin A1C; Future  Hyperkalemia  -     Comprehensive Metabolic Panel; Future  Gastroesophageal reflux disease, unspecified whether esophagitis present  -     dexlansoprazole (DEXILANT) 60 MG CPDR delayed release capsule; Take 1 capsule by mouth daily, Disp-90 capsule, R-1Normal    Recommendations for Preventive Services Due: see orders and patient instructions/AVS.  Recommended screening schedule for the next 5-10 years is provided to the patient in written form: see Patient Instructions/AVS.     No follow-ups on file.     Subjective   The following acute and/or chronic problems were also addressed today:  Uses inhaler more often this past year.    Patient's complete Health Risk Assessment and screening values have been reviewed and are found in Flowsheets. The following problems were reviewed today and where indicated follow up appointments were made and/or referrals ordered.                  Hearing Screen:  Do you or your family notice any trouble with your hearing that

## 2024-01-15 SDOH — ECONOMIC STABILITY: INCOME INSECURITY: HOW HARD IS IT FOR YOU TO PAY FOR THE VERY BASICS LIKE FOOD, HOUSING, MEDICAL CARE, AND HEATING?: NOT HARD AT ALL

## 2024-01-15 SDOH — ECONOMIC STABILITY: FOOD INSECURITY: WITHIN THE PAST 12 MONTHS, YOU WORRIED THAT YOUR FOOD WOULD RUN OUT BEFORE YOU GOT MONEY TO BUY MORE.: NEVER TRUE

## 2024-01-15 SDOH — ECONOMIC STABILITY: FOOD INSECURITY: WITHIN THE PAST 12 MONTHS, THE FOOD YOU BOUGHT JUST DIDN'T LAST AND YOU DIDN'T HAVE MONEY TO GET MORE.: NEVER TRUE

## 2024-01-17 ENCOUNTER — OFFICE VISIT (OUTPATIENT)
Dept: FAMILY MEDICINE CLINIC | Age: 74
End: 2024-01-17

## 2024-01-17 VITALS — HEIGHT: 69 IN | TEMPERATURE: 98.7 F | BODY MASS INDEX: 27.25 KG/M2 | WEIGHT: 184 LBS

## 2024-01-17 DIAGNOSIS — Z12.83 SKIN CANCER SCREENING: ICD-10-CM

## 2024-01-17 DIAGNOSIS — L30.9 ECZEMA, UNSPECIFIED TYPE: ICD-10-CM

## 2024-01-17 DIAGNOSIS — D18.01 CHERRY ANGIOMA: ICD-10-CM

## 2024-01-17 DIAGNOSIS — L82.1 SEBORRHEIC KERATOSES: ICD-10-CM

## 2024-01-17 DIAGNOSIS — L82.0 SEBORRHEIC KERATOSES, INFLAMED: ICD-10-CM

## 2024-01-17 DIAGNOSIS — H57.89 EYE SWELLING: Primary | ICD-10-CM

## 2024-01-17 SDOH — ECONOMIC STABILITY: INCOME INSECURITY: HOW HARD IS IT FOR YOU TO PAY FOR THE VERY BASICS LIKE FOOD, HOUSING, MEDICAL CARE, AND HEATING?: NOT HARD AT ALL

## 2024-01-17 SDOH — ECONOMIC STABILITY: FOOD INSECURITY: WITHIN THE PAST 12 MONTHS, THE FOOD YOU BOUGHT JUST DIDN'T LAST AND YOU DIDN'T HAVE MONEY TO GET MORE.: NEVER TRUE

## 2024-01-17 SDOH — ECONOMIC STABILITY: FOOD INSECURITY: WITHIN THE PAST 12 MONTHS, YOU WORRIED THAT YOUR FOOD WOULD RUN OUT BEFORE YOU GOT MONEY TO BUY MORE.: NEVER TRUE

## 2024-01-17 NOTE — PROGRESS NOTES
Diagnosis Orders   1. Eye swelling        2. Eczema, unspecified type        3. Cherry angioma        4. Seborrheic keratoses, inflamed        5. Seborrheic keratoses        6. Skin cancer screening          Return for L knee lesion liquid nitrogen 15 min appt .  Patient Instructions   Patient will consider plastic surgery referral for treatment of the eye puffiness whether be surgical or filler.  Does not require referral for this.    Patient would like to have her inflamed seborrheic keratosis removed.  She is going to think about which strategy she prefers.  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    patient has been educated on methods of skin cancer prevention such as using SPF 50 sunscreen products.    Patient has been educated on nature of the benign lesions that have been noted how to monitor for changes.    Patient has been educated on changes and lesions that would need earlier follow-up than routine 1 year skin check as well as the ABCD's of watching for signs of melanoma      Pt up to date with clobetasol for eczema.     No change in other lesions.    Pt can pursue plastic surgeon for discussion of eye puffiness    Subjective:      Patient ID: Juliette Peña is a 73 y.o. female who presents for:  Chief Complaint   Patient presents with    Skin Exam     X 1 year - and discuss puffy eyes   Concern: GENERAL over all check .- back, neck, knee & leg        Continues to have puffiness around the eyes especially inferiorly.  Tried caffeine creams.  Tried nasal spray for nasal allergies.  No improvement with either.  Not sure how first she wants to pursue improvement strategies.    Skin eczema is generally well-controlled until the winter.  She has mild flare going on now.  She does have cream for this.    Here for her routine screening for skin cancer as well.  No history of skin cancer        Current Outpatient Medications on File Prior to Visit

## 2024-01-17 NOTE — PATIENT INSTRUCTIONS
Patient will consider plastic surgery referral for treatment of the eye puffiness whether be surgical or filler.  Does not require referral for this.    Patient would like to have her inflamed seborrheic keratosis removed.  She is going to think about which strategy she prefers.  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    patient has been educated on methods of skin cancer prevention such as using SPF 50 sunscreen products.    Patient has been educated on nature of the benign lesions that have been noted how to monitor for changes.    Patient has been educated on changes and lesions that would need earlier follow-up than routine 1 year skin check as well as the ABCD's of watching for signs of melanoma      Pt up to date with clobetasol for eczema.     No change in other lesions.    Pt can pursue plastic surgeon for discussion of eye puffiness

## 2024-03-18 ENCOUNTER — APPOINTMENT (OUTPATIENT)
Dept: OTOLARYNGOLOGY | Facility: CLINIC | Age: 74
End: 2024-03-18
Payer: MEDICARE

## 2024-03-18 ENCOUNTER — APPOINTMENT (OUTPATIENT)
Dept: AUDIOLOGY | Facility: CLINIC | Age: 74
End: 2024-03-18
Payer: MEDICARE

## 2024-03-21 ENCOUNTER — TELEPHONE (OUTPATIENT)
Dept: FAMILY MEDICINE CLINIC | Age: 74
End: 2024-03-21

## 2024-03-21 NOTE — TELEPHONE ENCOUNTER
Received documentation from [place] reg recall on flonase. Med has potential of bacteria being present. SEE attached.     LMTCB for pt

## 2024-03-22 NOTE — TELEPHONE ENCOUNTER
Pt aware of the message is using the one on recall.. Said she is going to continue to use this because of the cost . She will call the company appreciates the call

## 2024-04-02 ENCOUNTER — PATIENT MESSAGE (OUTPATIENT)
Dept: FAMILY MEDICINE CLINIC | Age: 74
End: 2024-04-02

## 2024-04-02 ENCOUNTER — HOSPITAL ENCOUNTER (OUTPATIENT)
Dept: RADIOLOGY | Facility: CLINIC | Age: 74
Discharge: HOME | End: 2024-04-02
Payer: MEDICARE

## 2024-04-02 ENCOUNTER — OFFICE VISIT (OUTPATIENT)
Dept: ORTHOPEDIC SURGERY | Facility: CLINIC | Age: 74
End: 2024-04-02
Payer: MEDICARE

## 2024-04-02 VITALS — WEIGHT: 181 LBS | HEIGHT: 69 IN | BODY MASS INDEX: 26.81 KG/M2

## 2024-04-02 DIAGNOSIS — M25.552 LEFT HIP PAIN: ICD-10-CM

## 2024-04-02 DIAGNOSIS — S73.102A HIP SPRAIN, LEFT, INITIAL ENCOUNTER: ICD-10-CM

## 2024-04-02 DIAGNOSIS — S76.012A HIP STRAIN, LEFT, INITIAL ENCOUNTER: Primary | ICD-10-CM

## 2024-04-02 PROBLEM — Z96.641 STATUS POST TOTAL REPLACEMENT OF RIGHT HIP: Status: ACTIVE | Noted: 2024-04-02

## 2024-04-02 PROBLEM — T37.0X5D: Status: ACTIVE | Noted: 2024-04-02

## 2024-04-02 PROBLEM — H69.93 DYSFUNCTION OF BOTH EUSTACHIAN TUBES: Status: ACTIVE | Noted: 2024-04-02

## 2024-04-02 PROBLEM — M47.817 LUMBOSACRAL SPONDYLOSIS WITHOUT MYELOPATHY: Status: ACTIVE | Noted: 2017-08-11

## 2024-04-02 PROBLEM — M19.90 ARTHRITIS: Status: ACTIVE | Noted: 2024-04-02

## 2024-04-02 PROBLEM — M54.16 LUMBAR RADICULITIS: Status: ACTIVE | Noted: 2024-04-02

## 2024-04-02 PROBLEM — F41.9 ANXIETY: Status: ACTIVE | Noted: 2024-04-02

## 2024-04-02 PROBLEM — R42 ORTHOSTATIC LIGHTHEADEDNESS: Status: ACTIVE | Noted: 2024-04-02

## 2024-04-02 PROBLEM — T36.0X5A AMOXICILLIN-INDUCED ALLERGIC RASH: Status: ACTIVE | Noted: 2024-04-02

## 2024-04-02 PROBLEM — M25.551 HIP PAIN, RIGHT: Status: ACTIVE | Noted: 2024-04-02

## 2024-04-02 PROBLEM — H65.91 FLUID LEVEL BEHIND TYMPANIC MEMBRANE OF RIGHT EAR: Status: ACTIVE | Noted: 2024-04-02

## 2024-04-02 PROBLEM — M16.11 ARTHRITIS OF RIGHT HIP: Status: ACTIVE | Noted: 2024-04-02

## 2024-04-02 PROBLEM — R12 HEARTBURN: Status: ACTIVE | Noted: 2024-04-02

## 2024-04-02 PROBLEM — H90.3 BILATERAL SENSORINEURAL HEARING LOSS: Status: ACTIVE | Noted: 2024-04-02

## 2024-04-02 PROBLEM — L27.0 AMOXICILLIN-INDUCED ALLERGIC RASH: Status: ACTIVE | Noted: 2024-04-02

## 2024-04-02 PROBLEM — S92.353A FRACTURE OF 5TH METATARSAL: Status: ACTIVE | Noted: 2024-04-02

## 2024-04-02 PROBLEM — K59.00 CONSTIPATION: Status: ACTIVE | Noted: 2024-04-02

## 2024-04-02 PROBLEM — J01.00 ACUTE MAXILLARY SINUSITIS: Status: ACTIVE | Noted: 2024-04-02

## 2024-04-02 PROCEDURE — 1157F ADVNC CARE PLAN IN RCRD: CPT | Performed by: ORTHOPAEDIC SURGERY

## 2024-04-02 PROCEDURE — 73502 X-RAY EXAM HIP UNI 2-3 VIEWS: CPT | Mod: LT

## 2024-04-02 PROCEDURE — 99213 OFFICE O/P EST LOW 20 MIN: CPT | Performed by: ORTHOPAEDIC SURGERY

## 2024-04-02 PROCEDURE — 1159F MED LIST DOCD IN RCRD: CPT | Performed by: ORTHOPAEDIC SURGERY

## 2024-04-02 PROCEDURE — 73502 X-RAY EXAM HIP UNI 2-3 VIEWS: CPT | Mod: LEFT SIDE | Performed by: ORTHOPAEDIC SURGERY

## 2024-04-02 PROCEDURE — 1125F AMNT PAIN NOTED PAIN PRSNT: CPT | Performed by: ORTHOPAEDIC SURGERY

## 2024-04-02 RX ORDER — METHYLPREDNISOLONE 4 MG/1
TABLET ORAL
Qty: 21 TABLET | Refills: 0 | Status: CANCELLED | OUTPATIENT
Start: 2024-04-02

## 2024-04-02 RX ORDER — LEVOTHYROXINE SODIUM 75 UG/1
75 TABLET ORAL DAILY
COMMUNITY

## 2024-04-02 ASSESSMENT — PAIN - FUNCTIONAL ASSESSMENT: PAIN_FUNCTIONAL_ASSESSMENT: 0-10

## 2024-04-02 ASSESSMENT — PAIN SCALES - GENERAL: PAINLEVEL_OUTOF10: 8

## 2024-04-03 DIAGNOSIS — S76.012A HIP STRAIN, LEFT, INITIAL ENCOUNTER: ICD-10-CM

## 2024-04-03 DIAGNOSIS — S73.102A HIP SPRAIN, LEFT, INITIAL ENCOUNTER: ICD-10-CM

## 2024-04-03 PROBLEM — H69.90 DYSFUNCTION OF EUSTACHIAN TUBE: Status: ACTIVE | Noted: 2024-04-03

## 2024-04-03 PROBLEM — Z86.39 HISTORY OF METABOLIC DISORDER: Status: ACTIVE | Noted: 2024-04-03

## 2024-04-03 PROBLEM — K63.5 POLYP OF COLON: Status: ACTIVE | Noted: 2024-04-03

## 2024-04-03 RX ORDER — METHYLPREDNISOLONE 4 MG/1
TABLET ORAL
Qty: 21 TABLET | Refills: 0 | Status: SHIPPED | OUTPATIENT
Start: 2024-04-03

## 2024-04-03 NOTE — TELEPHONE ENCOUNTER
From: Juliette Peña  To: Mague Lopez  Sent: 4/2/2024 5:26 PM EDT  Subject: Reorder    I was wondering if you could put in an order for cyclobenzaprine 5mg. I am running out and experiencing muscle pain. I saw Dr. valenzuela today, thinking it was my nonsurgical hip that needed replacement. That was not the case. Has put me on prednisone, and PT. However it would be nice to have the Flexiril to help!     Juliette Peña

## 2024-04-03 NOTE — PROGRESS NOTES
History of present illness    73-year-old female I have not seen in several years has a history of a right hip replacement which was done in June 2020 she states that she started to have some symptoms on her left side that feels very similar to how her right hip started [place] and started having pain in the posterior aspect of the hip sometimes into the groin sometimes down the thigh no real numbness or tingling no specific injury.  She states she is unable to take oral anti-inflammatories as she has an allergy to aspirin she did take some prednisone and that did seem to help she states her pain is an 8 out of 10 currently.      Past medical , Surgical, Family and social history reviewed.      Physical exam  General: No acute distress and breathing comfortably.  Patient is pleasant and cooperative with the examination.    Extremity  Left hip examination shows only minimal pain with internal ex rotation she does have some tenderness in the SI joint posteriorly and along the sciatic notch a little bit in the bursal area straight leg raise testing is negative she is got good muscle strength distally brisk cap refill compartments soft calves nontender    Diagnostics      XR hip left with pelvis when performed 2 or 3 views    Result Date: 4/2/2024  Interpreted By:  Dariusz Sepulveda, STUDY: XR HIP LEFT WITH PELVIS WHEN PERFORMED 2 OR 3 VIEWS; 4/2/2024 1:57 pm   INDICATION: Signs/Symptoms:pain.   ACCESSION NUMBER(S): [id number]   ORDERING CLINICIAN: DARIUSZ SEPULVEDA   FINDINGS: AP pelvis and lateral view of the left hip. Patient is status post right total hip replacement in good alignment without bony abnormality left hip joint space is well-maintained without evidence of fracture dislocation or other bony abnormality     Signed by: Dariusz Sepulveda 4/2/2024 4:32 PM Dictation workstation:   [id number]       Procedure  [ none]    Assessment  Left-sided  hip and leg pain likely musculoskeletal    Treatment plan  1.  The natural history of the condition and its associated treatment alternatives including surgical and nonsurgical options were discussed with the patient at length.  2.  Reassured her hip x-ray looks okay minimal hip arthritis at this time certainly nothing like how her right hip look prior to surgery I think her issue is more musculoskeletal and likely a strain sprain in her hip with a component of some low back pain I recommended Medrol Dosepak and physical therapy which is provided today she will follow-up with me in a month if her symptoms persist we will consider MRI of her hip for further evaluation.  3. [   ]  4.  All of the patient's questions were answered.    Orders Placed This Encounter    XR hip left with pelvis when performed 2 or 3 views    Referral to Physical Therapy       This note was prepared using voice recognition software.  The details of this note are correct and have been reviewed, and corrected to the best of my ability.  Some grammatical areas may persist related to the Dragon software    Odin Sepulveda MD  Senior Attending Physician  [facility]  [facility] [place]    [phone]

## 2024-04-03 NOTE — TELEPHONE ENCOUNTER
Comments: pending pending, aware Chrissy is out of office.     Last Office Visit (last PCP visit):   1/10/2024    Next Visit Date:  Future Appointments   Date Time Provider Department Center   7/10/2024  8:00 AM Chrissy Lopez, APRN - CNP [facility]       **If hasn't been seen in over a year OR hasn't followed up according to last diabetes/ADHD visit, make appointment for patient before sending refill to provider.    Rx requested:  Requested Prescriptions     Pending Prescriptions Disp Refills    cyclobenzaprine (FLEXERIL) 10 MG tablet 30 tablet 0     Sig: Take 1 tablet by mouth 3 times daily as needed for Muscle spasms

## 2024-04-08 RX ORDER — CYCLOBENZAPRINE HCL 5 MG
10 TABLET ORAL 3 TIMES DAILY PRN
Qty: 30 TABLET | Refills: 0 | Status: SHIPPED | OUTPATIENT
Start: 2024-04-08 | End: 2024-04-18

## 2024-04-15 ENCOUNTER — OFFICE VISIT (OUTPATIENT)
Dept: OTOLARYNGOLOGY | Facility: CLINIC | Age: 74
End: 2024-04-15
Payer: MEDICARE

## 2024-04-15 ENCOUNTER — CLINICAL SUPPORT (OUTPATIENT)
Dept: AUDIOLOGY | Facility: CLINIC | Age: 74
End: 2024-04-15
Payer: MEDICARE

## 2024-04-15 VITALS — BODY MASS INDEX: 27.25 KG/M2 | HEIGHT: 69 IN | WEIGHT: 184 LBS

## 2024-04-15 DIAGNOSIS — H61.22 IMPACTED CERUMEN, LEFT EAR: ICD-10-CM

## 2024-04-15 DIAGNOSIS — H93.13 TINNITUS OF BOTH EARS: ICD-10-CM

## 2024-04-15 DIAGNOSIS — H90.3 ASYMMETRICAL SENSORINEURAL HEARING LOSS: Primary | ICD-10-CM

## 2024-04-15 PROCEDURE — 1160F RVW MEDS BY RX/DR IN RCRD: CPT | Performed by: OTOLARYNGOLOGY

## 2024-04-15 PROCEDURE — 1036F TOBACCO NON-USER: CPT | Performed by: OTOLARYNGOLOGY

## 2024-04-15 PROCEDURE — 1159F MED LIST DOCD IN RCRD: CPT | Performed by: OTOLARYNGOLOGY

## 2024-04-15 PROCEDURE — 1126F AMNT PAIN NOTED NONE PRSNT: CPT | Performed by: OTOLARYNGOLOGY

## 2024-04-15 PROCEDURE — 92557 COMPREHENSIVE HEARING TEST: CPT

## 2024-04-15 PROCEDURE — 1157F ADVNC CARE PLAN IN RCRD: CPT | Performed by: OTOLARYNGOLOGY

## 2024-04-15 PROCEDURE — 92550 TYMPANOMETRY & REFLEX THRESH: CPT

## 2024-04-15 PROCEDURE — 69210 REMOVE IMPACTED EAR WAX UNI: CPT | Performed by: OTOLARYNGOLOGY

## 2024-04-15 PROCEDURE — 99213 OFFICE O/P EST LOW 20 MIN: CPT | Performed by: OTOLARYNGOLOGY

## 2024-04-15 RX ORDER — SIMVASTATIN 20 MG/1
20 TABLET, FILM COATED ORAL NIGHTLY
COMMUNITY

## 2024-04-15 RX ORDER — ESCITALOPRAM OXALATE 5 MG/1
5 TABLET ORAL DAILY
COMMUNITY

## 2024-04-15 RX ORDER — DEXLANSOPRAZOLE 60 MG/1
60 CAPSULE, DELAYED RELEASE ORAL DAILY
COMMUNITY

## 2024-04-15 RX ORDER — FLUTICASONE PROPIONATE 50 MCG
1 SPRAY, SUSPENSION (ML) NASAL DAILY
COMMUNITY

## 2024-04-15 RX ORDER — MONTELUKAST SODIUM 10 MG/1
10 TABLET ORAL NIGHTLY
COMMUNITY

## 2024-04-15 RX ORDER — CETIRIZINE HYDROCHLORIDE 10 MG/1
10 TABLET, CHEWABLE ORAL DAILY
COMMUNITY

## 2024-04-15 ASSESSMENT — PATIENT HEALTH QUESTIONNAIRE - PHQ9
SUM OF ALL RESPONSES TO PHQ9 QUESTIONS 1 AND 2: 0
1. LITTLE INTEREST OR PLEASURE IN DOING THINGS: NOT AT ALL
2. FEELING DOWN, DEPRESSED OR HOPELESS: NOT AT ALL

## 2024-04-15 ASSESSMENT — PAIN SCALES - GENERAL: PAINLEVEL: 0-NO PAIN

## 2024-04-15 NOTE — LETTER
April 16, 2024     Hilary Barillas, FRANKIE-CNP  [address], [address]  [facility] [address]    Patient: Tara Brown   YOB: 1950   Date of Visit: 4/15/2024       Dear Dr. Hilary Barillas, FRANKIE-CNP:    Thank you for referring Tara Brown to me for evaluation. Below are my notes for this consultation.  If you have questions, please do not hesitate to call me. I look forward to following your patient along with you.       Sincerely,     Lyndsey Camejo, BECKY, CCC-A      CC: No Recipients  ______________________________________________________________________________________    AUDIOLOGIC EVALUATION  Name: Tara Brown  YOB: 1950  MRN: [id number]  Age: 73 y.o.    Date of Evaluation:  4/15/2024    History:  Tara Brown, 73 y.o., was seen today for a hearing evaluation in a conjunction visit with Dr. Cárdenas. The patient reported that her hearing has decreased in both ears, the left more than the right. She noted aural fullness in both ears, the left more than the right. She reported intermittent tinnitus. She denied otalgia and dizziness.     Previous audiologic evaluation on 11/10/2022 revealed a mild sensorineural hearing loss rising to within normal limits from 1000 - 2000 Hz sloping to a mild sensorineural hearing loss in the right ear and a mild sloping to essentially moderate from 500 - 1000 Hz rising back to a mild mixed hearing loss in the left ear. Tympanograms were type A (normal) in both ears. Word recognition abilities were measured to be excellent in both ears.     Evaluation:    Otoscopy  Right ear: clear canal  Left ear:  mild debris in the canal    Tympanometry  Right ear: Type Ad, normal ear canal volume with high compliance.   Left ear: Type A, normal ear canal volume and compliance.    Acoustic Reflexes  Right ear: Ipsilateral acoustic reflexes present at 500 - 4000 Hz  Left ear:  Ipsilateral acoustic reflexes absent at 500 - 4000 Hz    Audiometric  Evaluation  Right ear: mild sensorineural hearing loss rising to normal hearing at 1000 Hz sloping back to a mild sensorineural hearing loss. Word recognition ability estimated to be excellent (100%) at 60 dB HL based on an NU-6 recorded ordered by difficulty 10-word list.  Left ear: mild sloping to moderate from 500 - 1000 Hz rising back to a mild sensorineural hearing loss. Word recognition ability estimated to be good (84%) at 80 dB HL based on an NU-6 recorded 25-word list.    NOTE: asymmetry of 15 - 40 dB HL noted from 500 - 3000 Hz (left poorer than right)    When compared to previous test results of 11/10/2022, thresholds are essentially stable.    The test results were discussed with the patient and they were returned to Devonte Cárdenas MD for completion of the office visit.    Impressions  Today's evaluation revealed a mild sensorineural hearing loss rising to normal hearing at 1000 Hz sloping back to a mild sensorineural hearing loss in the right ear and a mild sloping to moderate from 500 - 1000 Hz rising back to a mild sensorineural hearing loss in the left ear. Word recognition abilities were measured to be excellent in the right ear and good in the left ear. Overall results are essentially stable when compared to her most recent hearing test.     The patient was informed that they are a candidate for binaural amplification. They were encouraged to contact their insurance provider to inquire about a possible hearing aid benefit and where it can be used. If they do not have a hearing aid benefit or wish to obtain hearing aids through our center, they were encouraged to schedule a hearing aid consult appointment at their earliest convenience. The patient was made aware of the $150 cost for this appointment. The patient was given a copy of today's audiogram with these recommendations.     Recommendations  - Continue medical follow-up with established providers   - Continue medical follow-up with   [facility]  - Re-test hearing annually  - Consider binaural amplification    Time: 7808-9505    BECKY Pantoja, CCC-A  Licensed Audiologist

## 2024-04-15 NOTE — PROGRESS NOTES
AUDIOLOGIC EVALUATION  Name: Tara Brown  YOB: 1950  MRN: [id number]  Age: 73 y.o.    Date of Evaluation:  4/15/2024    History:  Tara Brown, 73 y.o., was seen today for a hearing evaluation in a conjunction visit with Dr. Cárdenas. The patient reported that her hearing has decreased in both ears, the left more than the right. She noted aural fullness in both ears, the left more than the right. She reported intermittent tinnitus. She denied otalgia and dizziness.     Previous audiologic evaluation on 11/10/2022 revealed a mild sensorineural hearing loss rising to within normal limits from 1000 - 2000 Hz sloping to a mild sensorineural hearing loss in the right ear and a mild sloping to essentially moderate from 500 - 1000 Hz rising back to a mild mixed hearing loss in the left ear. Tympanograms were type A (normal) in both ears. Word recognition abilities were measured to be excellent in both ears.     Evaluation:    Otoscopy  Right ear: clear canal  Left ear:  mild debris in the canal    Tympanometry  Right ear: Type Ad, normal ear canal volume with high compliance.   Left ear: Type A, normal ear canal volume and compliance.    Acoustic Reflexes  Right ear: Ipsilateral acoustic reflexes present at 500 - 4000 Hz  Left ear:  Ipsilateral acoustic reflexes absent at 500 - 4000 Hz    Audiometric Evaluation  Right ear: mild sensorineural hearing loss rising to normal hearing at 1000 Hz sloping back to a mild sensorineural hearing loss. Word recognition ability estimated to be excellent (100%) at 60 dB HL based on an NU-6 recorded ordered by difficulty 10-word list.  Left ear: mild sloping to moderate from 500 - 1000 Hz rising back to a mild sensorineural hearing loss. Word recognition ability estimated to be good (84%) at 80 dB HL based on an NU-6 recorded 25-word list.    NOTE: asymmetry of 15 - 40 dB HL noted from 500 - 3000 Hz (left poorer than right)    When compared to previous test results of  11/10/2022, thresholds are essentially stable.    The test results were discussed with the patient and they were returned to Devonte Cárdenas MD for completion of the office visit.    Impressions  Today's evaluation revealed a mild sensorineural hearing loss rising to normal hearing at 1000 Hz sloping back to a mild sensorineural hearing loss in the right ear and a mild sloping to moderate from 500 - 1000 Hz rising back to a mild sensorineural hearing loss in the left ear. Word recognition abilities were measured to be excellent in the right ear and good in the left ear. Overall results are essentially stable when compared to her most recent hearing test.     The patient was informed that they are a candidate for binaural amplification. They were encouraged to contact their insurance provider to inquire about a possible hearing aid benefit and where it can be used. If they do not have a hearing aid benefit or wish to obtain hearing aids through our center, they were encouraged to schedule a hearing aid consult appointment at their earliest convenience. The patient was made aware of the $150 cost for this appointment. The patient was given a copy of today's audiogram with these recommendations.     Recommendations  - Continue medical follow-up with established providers   - Continue medical follow-up with Dr. Cárdenas  - Re-test hearing annually  - Consider binaural amplification    Time: 3098-9755    BECKY Pantoja, CCC-A  Licensed Audiologist

## 2024-04-15 NOTE — PROGRESS NOTES
History of present illness:  Tara Brown is a 73 y.o. female presenting today for follow-up.  She denies any acute otological issues. But feels that hearing loss is affecting her day to day life and ability to communicate. Recently went vacationing with grand kids in [place].    Recall 12/22/2020  This is a follow-up visit for Mrs. Brown. Subjectively she feels that she may have slightly decreased hearing in the right ear. She also describes sensitivity to sounds when her  is whistling. She denies any significant otalgia otorrhea or vestibular complaints. Her trip overseas was uneventful from an ophthalmologic standpoint.     recall:  69 yo F with left asymmetrical SNHL, right middle ear effusion, and ETD who presents today for follow up. At last visit she was noted to have left maxillary sinusitis and was already on azithromycin. Medrol dosepak was added. She also obtained MRI and this was reviewed and showed right middle ear opacification but no evidence of masses/tumors. No new otologic symptoms since last visit        The patient's current medications, active allergies and list of medical problems were reviewed in the EHR and confirmed electronically there are as follows;  Allergies:   Allergies   Allergen Reactions    Adhesive Unknown    House Dust Mite Unknown    Penicillins Unknown    Sulfa (Sulfonamide Antibiotics) Unknown     Current list of medications:   Current Outpatient Medications   Medication Sig Dispense Refill    docusate sodium (Colace) 100 mg capsule TAKE 1 CAPSULE BY MOUTH TWICE DAILY 60 capsule 3    levothyroxine (Synthroid, Levoxyl) 75 mcg tablet Take 1 tablet (75 mcg) by mouth once daily.      linaCLOtide (Linzess) 72 mcg capsule Take by mouth.      methylPREDNISolone (Medrol Dospak) 4 mg tablets Use as directed by package instructions 21 tablet 0     No current facility-administered medications for this visit.     Problem list:  Patient Active Problem List   Diagnosis     Acute maxillary sinusitis    Allergic rhinitis    Amoxicillin-induced allergic rash    Anxiety    Arthritis    Arthritis of right hip    Asthma (HHS-HCC)    Constipation    Contusion, hand    Dysfunction of both eustachian tubes    Fluid level behind tympanic membrane of right ear    Fracture of 5th metatarsal    GERD (gastroesophageal reflux disease)    Hashimoto's disease    Heartburn    Hip pain, right    Hyperlipidemia    Hypothyroidism    Lumbago    Lumbar radiculitis    Lumbosacral spondylosis without myelopathy    Bilateral sensorineural hearing loss    Nasal polyps    Orthostatic lightheadedness    Palpitations    Prediabetes    Status post total replacement of right hip    Sulfasalazine adverse reaction, subsequent encounter    Trigger finger of right hand    Dysfunction of eustachian tube    History of metabolic disorder    Polyp of colon         Physical Examination:  CONSTITUTIONAL:  No acute distress  VOICE:  No hoarseness or other abnormality  RESPIRATION:  Breathing comfortably, no stridor  CV:  No clubbing/cyanosis/edema in hands  EYES:  EOM intact, sclera clear  NEURO:  Alert and oriented times 3, Cranial nerves II-XII grossly intact and symmetric bilaterally  HEAD AND FACE:  Symmetric facial features, no masses or lesions  RIGHT EAR:  Normal external ear and post auricular area, no visible lesions, external auditory canal patent, tympanic membrane intact, no retraction, no signs of mass, effusion, or infection within the middle ear  LEFT EAR: Actually what looks like cerumen debris. But upon cleaning and under examination, it became obvious this is sand particles. The patient did mention that she went to [facility], was buried in sand by the grandkids. This was removed and cleaned using the suction and micro instruments.  NOSE:  Anterior rhinoscopy clear, no significant external deformity.  ORAL CAVITY/OROPHARYNX/LIPS:  normal lining, mobile tongue.  PHARYNGEAL WALLS: Moist mucosal lining, good  palatal elevation  NECK/LYMPH:  No LAD, no thyroid masses, trachea midline  SKIN:  Neck and facial skin is without scar or injury  PSYCH:  Alert and oriented with appropriate mood and affect    Diagnostic testing:    Audiometry:  Audiometry testing done on 04/15/24 reveals:  On the right: Mild predominantly sensory neural hearing loss. Speech discrimination is 100%.  On the left: Moderate sensorineural hearing loss,Asymmetry present. Speech discrimination is 84%.    This has been essentially stable since November of 2022.    MRI findings from 2019 reveal: No focal lesions of the 7th and 8th cranial nerve complexes.    I personally reviewed the available patient's external record and independently reviewed their audiometric testing [and] radiographic imaging through the appropriate viewing software as detailed in my note and agree with the detailed report.      Impression:  Asymmetric hearing loss   Left cerumen impaction  History of Eustachian tube dysfunction    Recommendation:  I recommended hearing amplification. She will set up a hearing aid evaluation and if she's interested, we will place an order for a hearing aid usage. Follow up in a year or two, mostly for monitoring of her hearing loss.      This note was created using speech recognition transcription software. Despite proofreading, several typographical errors might be present that might affect the meaning of the content. Please call with any questions.      Scribe Attestation  By signing my name below, Tawana NULL, [employer]   attest that this documentation has been prepared under the direction and in the presence of Devonte Cárdenas MD.

## 2024-04-16 ENCOUNTER — OFFICE VISIT (OUTPATIENT)
Dept: GASTROENTEROLOGY | Facility: CLINIC | Age: 74
End: 2024-04-16
Payer: MEDICARE

## 2024-04-16 VITALS
SYSTOLIC BLOOD PRESSURE: 137 MMHG | TEMPERATURE: 97.1 F | RESPIRATION RATE: 18 BRPM | WEIGHT: 184 LBS | OXYGEN SATURATION: 94 % | DIASTOLIC BLOOD PRESSURE: 72 MMHG | BODY MASS INDEX: 27.25 KG/M2 | HEART RATE: 85 BPM | HEIGHT: 69 IN

## 2024-04-16 DIAGNOSIS — K59.01 SLOW TRANSIT CONSTIPATION: Primary | ICD-10-CM

## 2024-04-16 DIAGNOSIS — K21.9 GASTROESOPHAGEAL REFLUX DISEASE WITHOUT ESOPHAGITIS: ICD-10-CM

## 2024-04-16 PROCEDURE — 99214 OFFICE O/P EST MOD 30 MIN: CPT | Performed by: INTERNAL MEDICINE

## 2024-04-16 PROCEDURE — 1036F TOBACCO NON-USER: CPT | Performed by: INTERNAL MEDICINE

## 2024-04-16 PROCEDURE — 1157F ADVNC CARE PLAN IN RCRD: CPT | Performed by: INTERNAL MEDICINE

## 2024-04-16 PROCEDURE — 1160F RVW MEDS BY RX/DR IN RCRD: CPT | Performed by: INTERNAL MEDICINE

## 2024-04-16 PROCEDURE — 1159F MED LIST DOCD IN RCRD: CPT | Performed by: INTERNAL MEDICINE

## 2024-04-16 RX ORDER — BISACODYL 5 MG
5 TABLET, DELAYED RELEASE (ENTERIC COATED) ORAL DAILY PRN
COMMUNITY

## 2024-04-16 RX ORDER — ASPIRIN 325 MG
325 TABLET ORAL 2 TIMES DAILY
COMMUNITY

## 2024-04-16 ASSESSMENT — ENCOUNTER SYMPTOMS
SHORTNESS OF BREATH: 0
VOMITING: 0
HEMATEMESIS: 0
CONSTIPATION: 1
COUGH: 1
DIARRHEA: 0
NAUSEA: 0
BLOATING: 0
HEARTBURN: 0
ABDOMINAL PAIN: 0
WEIGHT LOSS: 0
JAUNDICE: 0
CHANGE IN BOWEL HABIT: 0
BOWEL INCONTINENCE: 0
HEMATOCHEZIA: 0

## 2024-04-16 NOTE — PROGRESS NOTES
REASON FOR VISIT:  Constipation & heartburn     HPI:  Tara Brown is a 73 y.o. female who presents for follow up     GERD - feels symptoms controlled with dexlansoprazole   Constipation - pt taking dulcolax three times a week -mostly with traveling  Moving bowels around 3x per week - some straining   Denies abdominal pain , bloating or cramping   Denies BRB, melena , or mucous        Med list notable for dexlansoprazole 60mg daily , aspirin 325mg BID       Labs notable for    No fhx of CRC.       Prev endoscopic eval:   >> Colonoscopy 05/2023 - Rectal polyp - HYPERPLASTIC POLYP - this type of polyp is benign and does not have any malignant potential. It typically does not warrant  further surveillance or treatment. Repeat surveillance in 3 years   >> EGD 06/2020 Impression:- Z-line variable, 38 cm from the incisors.                         - Multiple gastric polyps.                         - Normal examined duodenum.                         - No specimens collected.    REVIEW OF SYSTEMS    Review of Systems   Constitutional: Negative for weight loss.   Cardiovascular:  Negative for chest pain.   Respiratory:  Positive for cough. Negative for shortness of breath.    Gastrointestinal:  Positive for constipation. Negative for bloating, abdominal pain, change in bowel habit, bowel incontinence, diarrhea, dysphagia, heartburn, hematemesis, hematochezia, hemorrhoids, jaundice, melena, nausea and vomiting.          Allergies   Allergen Reactions    Adhesive Unknown    House Dust Mite Unknown    Penicillins Unknown    Sulfa (Sulfonamide Antibiotics) Unknown       Past Medical History:   Diagnosis Date    Gastro-esophageal reflux disease without esophagitis     Gastric reflux    Personal history of other diseases of the respiratory system     History of nasal polyp    Personal history of other diseases of the respiratory system     History of asthma    Personal history of other endocrine, nutritional and metabolic  disease     History of high cholesterol    Unilateral primary osteoarthritis, right hip 2020    Primary osteoarthritis of right hip       Past Surgical History:   Procedure Laterality Date    BREAST LUMPECTOMY  2014    Breast Surgery Lumpectomy     SECTION, CLASSIC  2014     Section    FOOT SURGERY  2014    Foot Surgery    OTHER SURGICAL HISTORY  2019    Sinus surgery    TONSILLECTOMY  2014    Tonsillectomy With Adenoidectomy       No family history on file.    Social History     Tobacco Use    Smoking status: Never    Smokeless tobacco: Never   Substance Use Topics    Alcohol use: Not on file       Current Outpatient Medications   Medication Sig Dispense Refill    linaCLOtide (Linzess) 72 mcg capsule Take by mouth.      cetirizine (ZyrTEC) 10 mg chewable tablet Chew 1 tablet (10 mg) once daily.      dexlansoprazole (Dexilant) 60 mg DR capsule Take 1 capsule (60 mg) by mouth once daily. Do not crush or chew.      docusate sodium (Colace) 100 mg capsule TAKE 1 CAPSULE BY MOUTH TWICE DAILY (Patient not taking: Reported on 4/15/2024) 60 capsule 3    escitalopram (Lexapro) 5 mg tablet Take 1 tablet (5 mg) by mouth once daily.      fluticasone (Flonase) 50 mcg/actuation nasal spray Administer 1 spray into each nostril once daily. Shake gently. Before first use, prime pump. After use, clean tip and replace cap.      levothyroxine (Synthroid, Levoxyl) 75 mcg tablet Take 1 tablet (75 mcg) by mouth once daily.      magnesium 30 mg tablet Take 1 tablet (30 mg) by mouth 2 times a day.      methylPREDNISolone (Medrol Dospak) 4 mg tablets Use as directed by package instructions (Patient not taking: Reported on 4/15/2024) 21 tablet 0    montelukast (Singulair) 10 mg tablet Take 1 tablet (10 mg) by mouth once daily at bedtime.      simvastatin (Zocor) 20 mg tablet Take 1 tablet (20 mg) by mouth once daily at bedtime.       No current facility-administered medications for this  visit.       PHYSICAL EXAM:  There were no vitals taken for this visit.     Physical Exam  Constitutional:       Comments: Awake   HENT:      Head: Normocephalic.   Cardiovascular:      Rate and Rhythm: Normal rate and regular rhythm.   Pulmonary:      Effort: Pulmonary effort is normal.      Breath sounds: Normal breath sounds.   Abdominal:      General: Bowel sounds are normal.      Palpations: Abdomen is soft.   Neurological:      Mental Status: She is alert.   Psychiatric:         Mood and Affect: Mood normal.          ASSESSMENT  73-year-old female presents for follow-up of longstanding heartburn and constipation.  She is taking Dexilant daily that seems to control her symptoms.  She does have increasing constipation when she travels and uses Dulcolax about 3 times a week.  We discussed other options such as MiraLAX or taking prune juice or prunes to avoid stimulant laxatives.  She is due for her next colonoscopy in 2026 due to a strong family history of colon polyps and colon cancer.  I will see her in follow-up in 1 year      Evaluation requested by Dr. Hilary Barillas, APRN-CNP.   My final recommendations will be communicated back to the requesting physician by way of shared Medical record or letter to requesting physician via fax.      Attending Note:   I have personally performed a face to face assessment of this Patient, which included an interview, physical exam and Assessment and Plan.  I have reviewed and confirmed the history and key findings as documented by the Medical Assistant and edited as appropriate.     Signature: Renata Ambriz MD    Date: 4/16/2024  Time: 2:03 PM

## 2024-04-18 ENCOUNTER — PATIENT MESSAGE (OUTPATIENT)
Dept: FAMILY MEDICINE CLINIC | Age: 74
End: 2024-04-18

## 2024-04-19 NOTE — TELEPHONE ENCOUNTER
From: Juliette Peña  To: Mague Lopez  Sent: 4/18/2024 4:16 PM EDT  Subject: Metabolic blood tests    I was just seen by my GI. She was interested in seeing my blood test results from Jan 2024. Both of us were unable to pull the records up.   The doctor is: Dr. Yu Childs at  fax is [phone]. Although she is most interested in the metabolic, please send all the bloodwork test results   Thank you  Juliette Peña

## 2024-05-07 ENCOUNTER — APPOINTMENT (OUTPATIENT)
Dept: ORTHOPEDIC SURGERY | Facility: CLINIC | Age: 74
End: 2024-05-07
Payer: MEDICARE

## 2024-05-21 ENCOUNTER — OFFICE VISIT (OUTPATIENT)
Dept: ORTHOPEDIC SURGERY | Facility: CLINIC | Age: 74
End: 2024-05-21
Payer: MEDICARE

## 2024-05-21 DIAGNOSIS — M54.16 LUMBAR RADICULOPATHY: Primary | ICD-10-CM

## 2024-05-21 DIAGNOSIS — M25.852 LEFT HIP IMPINGEMENT SYNDROME: ICD-10-CM

## 2024-05-21 PROCEDURE — 1036F TOBACCO NON-USER: CPT | Performed by: ORTHOPAEDIC SURGERY

## 2024-05-21 PROCEDURE — 99214 OFFICE O/P EST MOD 30 MIN: CPT | Performed by: ORTHOPAEDIC SURGERY

## 2024-05-21 PROCEDURE — 1159F MED LIST DOCD IN RCRD: CPT | Performed by: ORTHOPAEDIC SURGERY

## 2024-05-21 PROCEDURE — 1160F RVW MEDS BY RX/DR IN RCRD: CPT | Performed by: ORTHOPAEDIC SURGERY

## 2024-05-21 PROCEDURE — 1157F ADVNC CARE PLAN IN RCRD: CPT | Performed by: ORTHOPAEDIC SURGERY

## 2024-05-21 NOTE — PROGRESS NOTES
Subjective    Patient ID: Tara    Chief Complaint:   Chief Complaint   Patient presents with    Left Hip - Follow-up     LEFT HIP AND LEG PAIN S/P MDP AND PT     History of present illness    73-year-old female presented to clinic today for follow-up evaluation left-sided SI joint dysfunction and radicular symptoms.  She states that the Medrol Dosepak did give her some relief but this did not last.  She is now getting return of her symptoms of radiating discomfort down the left thigh to the left knee.  No numbness tingling no fevers chills.  She has previous history of lumbar stenosis and degenerative disc disease L3-L4-L5.  She states that physical therapy did not help much.  She is also been seeing a chiropractor for a long time which does more manipulative things versus the adjusting and she has gotten no relief with this.      Past medical , Surgical, Family and social history reviewed.      Physical exam  General: No acute distress and breathing comfortably.  Patient is pleasant and cooperative with the examination.    Extremity  Left hip is neurovascular intact.  Good range of motion today without groin pain.  Negative straight leg raise test.  No tenderness palpation laterally.  2+ pulses bilateral lower extremity.  Capillary refill within wrist distally.  Compartments are soft.  No instability.  Mild tenderness palpation left SI joint.    Diagnostics  [ none]  No results found.     Procedure  [ none]    Assessment  Left-sided SI joint dysfunction  Lumbar radiculopathy    Treatment plan  1.  At this time we will go ahead and further evaluate her pain pattern with MRI lumbar spine and left hip rule out intra-articular causes.  2.  She will continue weightbearing activity as tolerated  3.  Follow-up with us once we get her MRIs.  For complete plan and/or surgical details, please refer to Dr. Sepulveda's portion of the split/shared dictation.  4.  All of the patient's questions were answered.    No orders of  the defined types were placed in this encounter.      This note was prepared using voice recognition software.  The details of this note are correct and have been reviewed, and corrected to the best of my ability.  Some grammatical areas may persist related to the Dragon software    Jose Hitchcock PA-C, [facility]  Orthopedic [place]    [phone]    In a face-to-face encounter performed today, I Odin Sepulveda MD performed a history and physical examination, discussed pertinent diagnostic studies if indicated, and discussed diagnosis and management strategies with both the patient and the midlevel provider.  I reviewed the midlevel's note and agree with the documented findings and plan of care.  Greater than 50% of the evaluation and treatment decision was performed by the physician myself during today's visit.    73-year-old female here for follow-up of her left hip and leg pain she states has not really noticed much improvement she has been working with her chiropractor who essentially is a physical therapist she states the Medrol Dosepak definitely helped but the symptoms have persisted some days are better than others she has good days and bad days but she is having radiation of pain down the left lower extremity.  On examination she has got some weakness on the left side mild pain with internal ex rotation of the hip neurologically and good sensation distally brisk cap refill compartments are soft calf is nontender.  Impression is lumbar radiculitis left lower extremity rule out lumbar spine pathology versus left hip.  Plan is MRI lumbar spine and left hip she will follow-up with me once these are completed.      Patient has severe impairment related to her presenting condition.  It is significantly impairing bodily function.  We did discuss surgical and nonsurgical options.    This note was prepared using voice recognition software.  The details of this note are correct and have been  reviewed, and corrected to the best of my ability.  Some grammatical areas may persist related to the Dragon software    Odin Sepulveda MD  Senior Attending Physician  [facility]    [phone]

## 2024-05-22 ENCOUNTER — TELEPHONE (OUTPATIENT)
Dept: ORTHOPEDIC SURGERY | Facility: CLINIC | Age: 74
End: 2024-05-22
Payer: MEDICARE

## 2024-05-22 DIAGNOSIS — M25.852 LEFT HIP IMPINGEMENT SYNDROME: ICD-10-CM

## 2024-05-22 RX ORDER — DIAZEPAM 5 MG/1
TABLET ORAL
Qty: 2 TABLET | Refills: 0 | Status: SHIPPED | OUTPATIENT
Start: 2024-05-22

## 2024-05-22 NOTE — TELEPHONE ENCOUNTER
----- Message from Martine Olsen sent at 5/22/2024 12:10 PM EDT -----  Regarding: MRI 06/12 NEEDS MED  [facility] [place] SHE NEEDS A MED CALLED IN FOR THE MRI SHE IS HAVING DONE ON THE 12TH

## 2024-06-12 ENCOUNTER — HOSPITAL ENCOUNTER (OUTPATIENT)
Dept: RADIOLOGY | Facility: HOSPITAL | Age: 74
Discharge: HOME | End: 2024-06-12
Payer: MEDICARE

## 2024-06-12 DIAGNOSIS — M25.852 LEFT HIP IMPINGEMENT SYNDROME: ICD-10-CM

## 2024-06-12 DIAGNOSIS — M54.16 LUMBAR RADICULOPATHY: ICD-10-CM

## 2024-06-12 PROCEDURE — 73721 MRI JNT OF LWR EXTRE W/O DYE: CPT | Mod: LT

## 2024-06-12 PROCEDURE — 72148 MRI LUMBAR SPINE W/O DYE: CPT

## 2024-06-14 ENCOUNTER — APPOINTMENT (OUTPATIENT)
Dept: ORTHOPEDIC SURGERY | Facility: CLINIC | Age: 74
End: 2024-06-14
Payer: MEDICARE

## 2024-06-14 DIAGNOSIS — M54.16 LUMBAR RADICULOPATHY: Primary | ICD-10-CM

## 2024-06-14 NOTE — PROGRESS NOTES
History of present illness    73-year-old female I sent for MRI of her lumbar spine and hip she states she got both MRIs but her lumbar spine images are in the system and there is a report there are no hip images in the system and there is no report on her hip MRI.  She continues to complain of pain in the posterior aspect of her hip that radiates down her leg.  She states she has had a problem in the remote past maybe 10 years ago where she saw a back surgeon who sent her to pain management.  At that time they discussed what sounds like nerve ablation but she decided not to do it she has been working with a chiropractor and a massage therapist symptoms seem to be getting progressively worse.      Past medical , Surgical, Family and social history reviewed.      Physical exam  General: No acute distress and breathing comfortably.  Patient is pleasant and cooperative with the examination.    Extremity  Exam is unchanged from previous visit she has got good range of motion of her hip no pain with internal ex rotation maneuver neurologically intact distally brisk cap refill compartments are soft calf is nontender    Diagnostics      MR lumbar spine wo IV contrast    Result Date: 6/13/2024  Interpreted By:  Jeremie Alba, STUDY: MR LUMBAR SPINE WO IV CONTRAST; ;  6/12/2024 5:35 pm   INDICATION: Signs/Symptoms:pain. Low back pain with radiculopathy. Left SI joint pain. History of previous lumbar stenosis and degenerative disc disease.   COMPARISON: Previous MRI examination of 06/18/2019   ACCESSION NUMBER(S): [id number]   ORDERING CLINICIAN: DARIUSZ HAMILTON   TECHNIQUE: Multiplanar and multisequential MR images of the lumbar spine are performed.   FINDINGS: There is dextroscoliosis of the mid and upper lumbar spine. There is degenerative anterior subluxation of L4 measuring 4 mm and L3 measuring 2 mm. There is mild degenerative posterior subluxation of L1 measuring 3 mm.   There are  multilevel discogenic degenerative changes of the lumbar spine and lower thoracic spine with disc desiccation and disc space narrowing. There is multilevel degenerative endplate signal and endplate irregularity which is more pronounced in the lumbar spine at L1-2 and L5-S1.   The lumbar vertebral body heights are maintained. There is no acute bone marrow edema.   The conus medullaris is of normal morphology and signal. The tip of the conus descends to the L2 level.   Axial images are performed through the lumbar disc spaces from the mid T12 level through S1.   The T12-L1 disc space level demonstrates mild to moderate bilateral facet arthrosis and mild ligamentum flavum hypertrophy. There is mild bulging disc and marginal osteophyte with some effacement of the ventral subarachnoid space and mild central canal narrowing. There is disc and osteophyte encroachment with mild narrowing at the caudal right neural foramen.   The L1-2 disc space level demonstrates mild to moderate facet arthrosis, greater on the left. There is mild bilateral ligamentum flavum hypertrophy. There is bulging disc and marginal osteophyte with mass effect upon the anterior thecal sac and mild central canal narrowing. There is disc and osteophyte encroachment with mild narrowing at the caudal right neural foramen and moderate narrowing on the left.   The L2-3 disc space level demonstrates moderate bilateral facet arthrosis and moderate to severe ligamentum flavum hypertrophy. There is mild to moderate circumferential bulging disc with mass effect upon the anterior thecal sac and moderate central canal narrowing. There is some crowding of the intrathecal nerve roots. There is mild disc encroachment and narrowing at the caudal neural foramen bilaterally.   The L3-4 disc space level demonstrates severe bilateral facet arthrosis and moderate ligamentum flavum hypertrophy. There is superimposed bulging disc with mass effect upon the anterior thecal sac  and mild to moderate central canal narrowing. There is mild disc encroachment on the caudal neural foramen.   The L4-5 disc space level demonstrates severe bilateral facet arthrosis and moderate ligamentum flavum hypertrophy. There is mild bulging disc with flattening of the anterior thecal sac and mild to moderate central canal narrowing. There is mild disc encroachment and narrowing at the caudal right neural foramen.   The L5-S1 disc space level demonstrates mild facet arthrosis greater on the right. There is mild bulging disc with mild disc encroachment on the caudal neural foramen bilaterally. There is no significant central canal narrowing.   The surrounding soft tissues are unremarkable.       Multilevel discogenic degenerative changes of the lumbar spine and lower thoracic spine with dextroscoliosis.   Multilevel hypertrophic facet arthrosis greater at lower lumbar levels. There is degenerative anterior subluxation of L4 and L3 and posterior subluxation of L1.   Multilevel bulging disc with ligamentum flavum hypertrophy. There are varying degrees of central canal narrowing which are greatest and moderate in degree at L2-3. There is multilevel bilateral neural foraminal narrowing secondary to disc and osteophyte encroachment as detailed above.   No acute osseous abnormality.     MACRO: None   Signed by: Jeremie Alba 6/13/2024 11:58 AM Dictation workstation:   [id number]       Procedure  [ none]    Assessment  Reviewed her MRI with her she discussed significant degenerative changes in her lumbar spine and stenosis unfortunately the images for her hip are not available as of yet    Treatment plan  1.  Recommend a referral to the back specialist we will go ahead and refer her to Dr. Crockett and his team for evaluation of her back in the meantime limited to what I can to track down the MRI of her hip and find out where those images are and where the report is and will reach out to her once those  results are available.  2. [   ]  3. [   ]  4.  All of the patient's questions were answered.    No orders of the defined types were placed in this encounter.      This note was prepared using voice recognition software.  The details of this note are correct and have been reviewed, and corrected to the best of my ability.  Some grammatical areas may persist related to the Dragon software    Odin Sepulveda MD  Senior Attending Physician  [facility]  Orthopedic [place]    [phone]

## 2024-06-18 ENCOUNTER — APPOINTMENT (OUTPATIENT)
Dept: ORTHOPEDIC SURGERY | Facility: CLINIC | Age: 74
End: 2024-06-18
Payer: MEDICARE

## 2024-06-18 DIAGNOSIS — M75.32 CALCIFIC TENDINITIS OF LEFT SHOULDER: Primary | ICD-10-CM

## 2024-06-18 DIAGNOSIS — M54.16 LUMBAR RADICULOPATHY: ICD-10-CM

## 2024-06-18 PROCEDURE — 1125F AMNT PAIN NOTED PAIN PRSNT: CPT | Performed by: PHYSICIAN ASSISTANT

## 2024-06-18 PROCEDURE — 1157F ADVNC CARE PLAN IN RCRD: CPT | Performed by: PHYSICIAN ASSISTANT

## 2024-06-18 PROCEDURE — 20610 DRAIN/INJ JOINT/BURSA W/O US: CPT | Performed by: PHYSICIAN ASSISTANT

## 2024-06-18 PROCEDURE — 1159F MED LIST DOCD IN RCRD: CPT | Performed by: PHYSICIAN ASSISTANT

## 2024-06-18 PROCEDURE — 99214 OFFICE O/P EST MOD 30 MIN: CPT | Performed by: PHYSICIAN ASSISTANT

## 2024-06-18 PROCEDURE — 1036F TOBACCO NON-USER: CPT | Performed by: PHYSICIAN ASSISTANT

## 2024-06-18 RX ORDER — TRIAMCINOLONE ACETONIDE 40 MG/ML
2.5 INJECTION, SUSPENSION INTRA-ARTICULAR; INTRAMUSCULAR
Status: COMPLETED | OUTPATIENT
Start: 2024-06-18 | End: 2024-06-18

## 2024-06-18 RX ORDER — LIDOCAINE HYDROCHLORIDE 10 MG/ML
0.5 INJECTION INFILTRATION; PERINEURAL
Status: COMPLETED | OUTPATIENT
Start: 2024-06-18 | End: 2024-06-18

## 2024-06-18 ASSESSMENT — PAIN - FUNCTIONAL ASSESSMENT: PAIN_FUNCTIONAL_ASSESSMENT: 0-10

## 2024-06-18 ASSESSMENT — PAIN SCALES - GENERAL: PAINLEVEL_OUTOF10: 7

## 2024-06-18 NOTE — PROGRESS NOTES
New patient to establish to the practice comes the office complaining about several different complaints.  She complains of low back pain.  Is not really shooting down the legs but she will get some leg symptoms.  Maybe cannot walk for long period of time.  She exercises and does a lot of activity.  As far as hiking.  She also complains of neck pain bilateral shoulder pain decreased range of motion to the left shoulder she is here for an MRI of the lumbar spine that Dr. Sepulveda ordered.  She had an MRI of her hip which was pretty much unremarkable.  She denies bowel or bladder complaints saddle anesthesia gait or balance changes denies recent trauma accident or falls.  Denies any spine surgery.    We looked at patient's recent blood work.  Checked the lab work.  Metabolic panel was checked showing glucose 137 sodium 136 potassium 3.7 looked at primary doctors note check medication list see if she is on any type of statin medication she is on simvastatin which can cause muscular aches and pains.    Physical exam: Well-nourished, well kept.  No lymphangitis or lymphadenopathy in the examined extremities.  Good perfusion to the extremities ×4.  Radial and dorsalis pedis pulses 2+.  Capillary refill to all 4 digits brisk.  No distal edema x 4.  Gait normal.  Can walk on heels and toes.  There is mild decreased range of motion flexion-extension rotation lumbar spine mildly tender good strength no instability.  Mild decreased range of motion cervical spine mildly tender good strength no instability.  She has decreased range of motion to the left upper extremity reaching overhead behind her back.  Mild positive Nava and mild Neer's.  Empty can fairly good strength.  Mild pain.  Comparison examination of the contralateral side is normal with regards to palpation, range of motion, strength and stability.  Examination of the lower extremities reveals no point tenderness, swelling, or deformity.  Range of motion of the  hips, knees, and ankles are full without crepitance, instability, or exacerbation of pain.  Strength is 5/5 throughout.  No redness, abrasions, or lesions on all 4 extremities, head and neck, or trunk.  Gross sensation intact in the extremities ×4.  Deep tendon reflexes 2+ and symmetric bilaterally.  Brittany, clonus, and Babinski were negative.  Straight leg raise negative.  Affect normal.  Alert and oriented ×3.  Coordination normal.    X-ray of the hip was reviewed showing some arthritic degenerative changes.  Prosthetic hip in the right and some mild degenerative changes on the left no evidence of any fractures.  MRI lumbar spine was reviewed showing showing a spondylolisthesis L4 degenerative disc at L5-S1.  She is got moderate central stenosis L2-3 L3-4 L4-5    Assessment this a patient with some upper extremity shoulder stop issues.  Some neck issues and back issues.  She says she does not really want to consider having a back surgery at this point.  I told her we get MRIs and we evaluate the sings of her looking for something to fix this from a surgical standpoint.  Far as her neck if she wanted to get an MRI so we can get an MRI of her neck if she is looking at having something surgically done if we find something.  As far as her shoulder I talked about injecting her shoulder we can try that today so much relief she gets have her see the shoulder specialist.  She would like to try that.  We discussed all risk benefits the options of the injection.    Plan: I went up with a pain management referral to maybe try injections in the back.  Will inject the shoulder today and I will follow-up with Dr. Linda in 2 weeks.    L Inj/Asp: L subacromial bursa on 6/18/2024 2:22 PM  Details: 22 G needle, lateral approach  Medications: 2.5 mg triamcinolone acetonide 40 mg/mL; 0.5 mL lidocaine 10 mg/mL (1 %)  Outcome: tolerated well, no immediate complications    Risks/benefits of a cortisone injection including infection,  local skin irritation, skin atrophy, calcification, continued pain/discomfort, elevated blood sugar, burning, failure to relieve pain, and possible leg infection.  Postop discomfort can be alleviated with additional medications/ice/elevation/rest over the first 24 hours as recommended.  After explaining these issues to the patient, it was understood.  The injection site was sprayed with ethyl chloride.  9 cc lidocaine 1 cc Kenalog injected into the left subacromial bursa posterior approach using sterile technique neurologic intact moving extremity well afterwards.  Procedure, treatment alternatives, risks and benefits explained, specific risks discussed. Immediately prior to procedure a time out was called to verify the correct patient, procedure, equipment, support staff and site/side marked as required. Patient was prepped and draped in the usual sterile fashion.

## 2024-07-08 ENCOUNTER — APPOINTMENT (OUTPATIENT)
Dept: ORTHOPEDIC SURGERY | Facility: CLINIC | Age: 74
End: 2024-07-08
Payer: MEDICARE

## 2024-07-08 ENCOUNTER — HOSPITAL ENCOUNTER (OUTPATIENT)
Dept: RADIOLOGY | Facility: HOSPITAL | Age: 74
Discharge: HOME | End: 2024-07-08
Payer: MEDICARE

## 2024-07-08 DIAGNOSIS — M19.019 GLENOHUMERAL ARTHRITIS: Primary | ICD-10-CM

## 2024-07-08 DIAGNOSIS — M25.512 BILATERAL SHOULDER PAIN, UNSPECIFIED CHRONICITY: ICD-10-CM

## 2024-07-08 DIAGNOSIS — M25.511 BILATERAL SHOULDER PAIN, UNSPECIFIED CHRONICITY: ICD-10-CM

## 2024-07-08 DIAGNOSIS — M75.32 CALCIFIC TENDINITIS OF LEFT SHOULDER: ICD-10-CM

## 2024-07-08 PROCEDURE — 20610 DRAIN/INJ JOINT/BURSA W/O US: CPT | Performed by: ORTHOPAEDIC SURGERY

## 2024-07-08 PROCEDURE — 1159F MED LIST DOCD IN RCRD: CPT | Performed by: ORTHOPAEDIC SURGERY

## 2024-07-08 PROCEDURE — 73030 X-RAY EXAM OF SHOULDER: CPT | Mod: 50

## 2024-07-08 PROCEDURE — 1157F ADVNC CARE PLAN IN RCRD: CPT | Performed by: ORTHOPAEDIC SURGERY

## 2024-07-08 PROCEDURE — 1036F TOBACCO NON-USER: CPT | Performed by: ORTHOPAEDIC SURGERY

## 2024-07-08 PROCEDURE — 99214 OFFICE O/P EST MOD 30 MIN: CPT | Performed by: ORTHOPAEDIC SURGERY

## 2024-07-08 PROCEDURE — 73030 X-RAY EXAM OF SHOULDER: CPT | Mod: BILATERAL PROCEDURE | Performed by: RADIOLOGY

## 2024-07-08 RX ORDER — LIDOCAINE HYDROCHLORIDE 10 MG/ML
5 INJECTION INFILTRATION; PERINEURAL
Status: COMPLETED | OUTPATIENT
Start: 2024-07-08 | End: 2024-07-08

## 2024-07-08 RX ORDER — BETAMETHASONE SODIUM PHOSPHATE AND BETAMETHASONE ACETATE 3; 3 MG/ML; MG/ML
2 INJECTION, SUSPENSION INTRA-ARTICULAR; INTRALESIONAL; INTRAMUSCULAR; SOFT TISSUE
Status: COMPLETED | OUTPATIENT
Start: 2024-07-08 | End: 2024-07-08

## 2024-07-08 NOTE — PROGRESS NOTES
History of present illness: Patient well-known to our practice bilateral shoulder arthritis    Recently had a cortisone shot left shoulder worked pretty good now her right shoulder became sore and stiff so she she is here for evaluation of both shoulders    Physical exam:    General: No acute distress or breathing difficulty or discomfort, pleasant and cooperative with the examination.    Extremities: The right shoulder was inspected and was found to have no obvious deformity.  There was tenderness to touch over the lateral edges of the shoulder over the rotator cuff insertion.  Active forward flexion 120 degrees, external rotation to 60 degrees, abduction to 50 degrees, and internal rotation to the level of L2.    At this time the shoulder is neurovascular tact and neurosensory intact.  Motor intact C5-T1.  There was no obvious neck pain or radiculopathy noted.  There was no gross instability or adhesive capsulitis symptoms.  There was no evidence of apprehension or apprehension suppression.    Strength was tested in 4 planes with weakness in the supraspinatus strength testing and external rotation position.  There was no strength deficit in internal rotation.  Impingement signs were positive both supine and standing for impingement test type I and II.  There was mild pain over the bicipital groove with a positive speeds sign    Before aspiration injection the benefits of a cortisone injection including infection, local skin irritation, skin atrophy, calcification, continued pain and discomfort, elevated blood sugar, burning, failure to relieve pain, possible late infection were discussed with the patient.    Postprocedure discomfort can be alleviated with additional medications, ice, elevation, rest over the first 24 hours as recommended.    Patient verbalized understanding and wanted to proceed with the planned procedure.    After informed consent was provided and allergies verified, the patient was positioned  appropriately on thel bed.  The right shoulder to be aspirated and injected was prepped and draped in a sterile fashion.  The skin was anesthetized with ethyl chloride spray.  A joint aspiration was to be performed an 18-gauge needle was used otherwise a 22-gauge needle was used to inject the appropriate joint.    Joint injection was performed with a mixture of 5 cc 1% lidocaine plain and 2 cc Celestone Soluspan 6 mg per mL.  The needle was removed and the puncture site closed and sealed with a Band-Aid.  The patient tolerated the procedure well.    Left shoulder seems to be moving better after the injection but still is limited motion compared to the right it is just not as painful    Diagnostic studies: X-rays both shoulders show advanced osteoarthritic change left shoulder bone-on-bone arthrosis osteophyte spur information and calcific loose bodies    2 views right shoulder show moderate early osteoarthritic change right shoulder    Impression: Bilateral shoulder arthritis significantly worse on the left than right    Plan: Injection right shoulder today    Moist heat gentle range of motion program continue swimming program I will see her back 4 to 6 weeks if not improved fluoroscopic intra-articular injections may be beneficial hopefully she can avoid shoulder arthroplasty for years to come    L Inj/Asp: R subacromial bursa on 7/8/2024 10:38 AM  Indications: pain  Details: 22 G needle, medial approach  Medications: 2 mL betamethasone acet,sod phos 6 mg/mL; 5 mL lidocaine 10 mg/mL (1 %)  Outcome: tolerated well, no immediate complications  Procedure, treatment alternatives, risks and benefits explained, specific risks discussed. Consent was given by the patient. Immediately prior to procedure a time out was called to verify the correct patient, procedure, equipment, support staff and site/side marked as required.

## 2024-07-10 ENCOUNTER — OFFICE VISIT (OUTPATIENT)
Dept: FAMILY MEDICINE CLINIC | Age: 74
End: 2024-07-10

## 2024-07-10 VITALS
DIASTOLIC BLOOD PRESSURE: 68 MMHG | TEMPERATURE: 98.4 F | OXYGEN SATURATION: 97 % | SYSTOLIC BLOOD PRESSURE: 122 MMHG | BODY MASS INDEX: 26.22 KG/M2 | HEART RATE: 72 BPM | HEIGHT: 69 IN | WEIGHT: 177 LBS

## 2024-07-10 DIAGNOSIS — F32.A ANXIETY AND DEPRESSION: ICD-10-CM

## 2024-07-10 DIAGNOSIS — J32.9 SINOBRONCHITIS: ICD-10-CM

## 2024-07-10 DIAGNOSIS — E53.8 B12 DEFICIENCY: ICD-10-CM

## 2024-07-10 DIAGNOSIS — R73.03 PREDIABETES: ICD-10-CM

## 2024-07-10 DIAGNOSIS — K21.9 GASTROESOPHAGEAL REFLUX DISEASE, UNSPECIFIED WHETHER ESOPHAGITIS PRESENT: ICD-10-CM

## 2024-07-10 DIAGNOSIS — E03.9 HYPOTHYROIDISM, UNSPECIFIED TYPE: ICD-10-CM

## 2024-07-10 DIAGNOSIS — E78.5 HYPERLIPIDEMIA, UNSPECIFIED HYPERLIPIDEMIA TYPE: ICD-10-CM

## 2024-07-10 DIAGNOSIS — J40 SINOBRONCHITIS: ICD-10-CM

## 2024-07-10 DIAGNOSIS — F41.9 ANXIETY AND DEPRESSION: ICD-10-CM

## 2024-07-10 DIAGNOSIS — E78.5 HYPERLIPIDEMIA, UNSPECIFIED HYPERLIPIDEMIA TYPE: Primary | ICD-10-CM

## 2024-07-10 DIAGNOSIS — M62.830 BACK MUSCLE SPASM: ICD-10-CM

## 2024-07-10 DIAGNOSIS — J30.9 ALLERGIC RHINITIS, UNSPECIFIED SEASONALITY, UNSPECIFIED TRIGGER: ICD-10-CM

## 2024-07-10 LAB
ALBUMIN SERPL-MCNC: 4.4 G/DL (ref 3.5–4.6)
ALP SERPL-CCNC: 57 U/L (ref 40–130)
ALT SERPL-CCNC: 16 U/L (ref 0–33)
ANION GAP SERPL CALCULATED.3IONS-SCNC: 9 MEQ/L (ref 9–15)
AST SERPL-CCNC: 17 U/L (ref 0–35)
BASOPHILS # BLD: 0 K/UL (ref 0–0.2)
BASOPHILS NFR BLD: 0.4 %
BILIRUB SERPL-MCNC: 0.5 MG/DL (ref 0.2–0.7)
BUN SERPL-MCNC: 18 MG/DL (ref 8–23)
CALCIUM SERPL-MCNC: 9.2 MG/DL (ref 8.5–9.9)
CHLORIDE SERPL-SCNC: 95 MEQ/L (ref 95–107)
CHOLEST SERPL-MCNC: 192 MG/DL (ref 0–199)
CO2 SERPL-SCNC: 27 MEQ/L (ref 20–31)
CREAT SERPL-MCNC: 0.75 MG/DL (ref 0.5–0.9)
EOSINOPHIL # BLD: 0 K/UL (ref 0–0.7)
EOSINOPHIL NFR BLD: 0.1 %
ERYTHROCYTE [DISTWIDTH] IN BLOOD BY AUTOMATED COUNT: 12.7 % (ref 11.5–14.5)
GLOBULIN SER CALC-MCNC: 2.6 G/DL (ref 2.3–3.5)
GLUCOSE SERPL-MCNC: 94 MG/DL (ref 70–99)
HCT VFR BLD AUTO: 39.2 % (ref 37–47)
HDLC SERPL-MCNC: 73 MG/DL (ref 40–59)
HGB BLD-MCNC: 12.8 G/DL (ref 12–16)
LDLC SERPL CALC-MCNC: 104 MG/DL (ref 0–129)
LYMPHOCYTES # BLD: 1.3 K/UL (ref 1–4.8)
LYMPHOCYTES NFR BLD: 15.7 %
MCH RBC QN AUTO: 31 PG (ref 27–31.3)
MCHC RBC AUTO-ENTMCNC: 32.7 % (ref 33–37)
MCV RBC AUTO: 94.9 FL (ref 79.4–94.8)
MONOCYTES # BLD: 0.7 K/UL (ref 0.2–0.8)
MONOCYTES NFR BLD: 8 %
NEUTROPHILS # BLD: 6.4 K/UL (ref 1.4–6.5)
NEUTS SEG NFR BLD: 75.3 %
PLATELET # BLD AUTO: 331 K/UL (ref 130–400)
POTASSIUM SERPL-SCNC: 4.4 MEQ/L (ref 3.4–4.9)
PROT SERPL-MCNC: 7 G/DL (ref 6.3–8)
RBC # BLD AUTO: 4.13 M/UL (ref 4.2–5.4)
SODIUM SERPL-SCNC: 131 MEQ/L (ref 135–144)
TRIGL SERPL-MCNC: 75 MG/DL (ref 0–150)
TSH REFLEX: 0.99 UIU/ML (ref 0.44–3.86)
WBC # BLD AUTO: 8.4 K/UL (ref 4.8–10.8)

## 2024-07-10 RX ORDER — ESCITALOPRAM OXALATE 5 MG/1
TABLET ORAL
Qty: 90 TABLET | Refills: 1 | Status: SHIPPED | OUTPATIENT
Start: 2024-07-10

## 2024-07-10 RX ORDER — MAGNESIUM GLUCONATE 30 MG(550)
TABLET ORAL 2 TIMES DAILY
COMMUNITY

## 2024-07-10 RX ORDER — AZELASTINE 1 MG/ML
2 SPRAY, METERED NASAL 2 TIMES DAILY
Qty: 3 EACH | Refills: 1 | Status: SHIPPED | OUTPATIENT
Start: 2024-07-10

## 2024-07-10 RX ORDER — DEXLANSOPRAZOLE 60 MG/1
60 CAPSULE, DELAYED RELEASE ORAL DAILY
Qty: 90 CAPSULE | Refills: 1 | Status: SHIPPED | OUTPATIENT
Start: 2024-07-10

## 2024-07-10 RX ORDER — FLUTICASONE PROPIONATE 50 MCG
2 SPRAY, SUSPENSION (ML) NASAL DAILY
Qty: 3 EACH | Refills: 1 | Status: SHIPPED | OUTPATIENT
Start: 2024-07-10

## 2024-07-10 RX ORDER — CYCLOBENZAPRINE HCL 10 MG
10 TABLET ORAL 3 TIMES DAILY PRN
Qty: 30 TABLET | Refills: 0 | Status: SHIPPED | OUTPATIENT
Start: 2024-07-10 | End: 2024-07-20

## 2024-07-10 RX ORDER — LEVOTHYROXINE SODIUM 0.07 MG/1
TABLET ORAL
Qty: 90 TABLET | Refills: 1 | Status: SHIPPED | OUTPATIENT
Start: 2024-07-10

## 2024-07-10 RX ORDER — SIMVASTATIN 20 MG
TABLET ORAL
Qty: 90 TABLET | Refills: 1 | Status: SHIPPED | OUTPATIENT
Start: 2024-07-10

## 2024-07-10 RX ORDER — MONTELUKAST SODIUM 10 MG/1
TABLET ORAL
Qty: 90 TABLET | Refills: 1 | Status: SHIPPED | OUTPATIENT
Start: 2024-07-10

## 2024-07-10 RX ORDER — MAGNESIUM OXIDE 400 MG/1
400 TABLET ORAL DAILY
Qty: 90 TABLET | Refills: 1 | Status: SHIPPED | OUTPATIENT
Start: 2024-07-10

## 2024-07-10 ASSESSMENT — ENCOUNTER SYMPTOMS
SHORTNESS OF BREATH: 0
COUGH: 0

## 2024-07-10 NOTE — PROGRESS NOTES
montelukast (SINGULAIR) 10 MG tablet    fluticasone (FLONASE) 50 MCG/ACT nasal spray      3. Anxiety and depression  escitalopram (LEXAPRO) 5 MG tablet      4. Hypothyroidism, unspecified type  levothyroxine (SYNTHROID) 75 MCG tablet    TSH with Reflex    Comprehensive Metabolic Panel      5. Gastroesophageal reflux disease, unspecified whether esophagitis present  dexlansoprazole (DEXILANT) 60 MG CPDR delayed release capsule      6. Sinobronchitis  azelastine (ASTELIN) 0.1 % nasal spray      7. B12 deficiency  Vitamin B12 & Folate      8. Prediabetes  Hemoglobin A1C      9. Back muscle spasm  cyclobenzaprine (FLEXERIL) 10 MG tablet          Orders Placed This Encounter   Procedures    Lipid Panel     Standing Status:   Future     Standing Expiration Date:   7/10/2025    Vitamin B12 & Folate     Standing Status:   Future     Standing Expiration Date:   7/10/2025    CBC with Auto Differential     Standing Status:   Future     Standing Expiration Date:   7/10/2025    TSH with Reflex     Standing Status:   Future     Standing Expiration Date:   7/10/2025    Comprehensive Metabolic Panel     Standing Status:   Future     Standing Expiration Date:   7/10/2025    Hemoglobin A1C     Standing Status:   Future     Standing Expiration Date:   7/10/2025       Orders Placed This Encounter   Medications    simvastatin (ZOCOR) 20 MG tablet     Sig: TAKE 1 TABLET EVERY EVENING     Dispense:  90 tablet     Refill:  1    montelukast (SINGULAIR) 10 MG tablet     Sig: TAKE 1 TABLET NIGHTLY     Dispense:  90 tablet     Refill:  1    magnesium oxide (MAG-OX) 400 MG tablet     Sig: Take 1 tablet by mouth daily     Dispense:  90 tablet     Refill:  1    escitalopram (LEXAPRO) 5 MG tablet     Sig: TAKE 1 TABLET DAILY     Dispense:  90 tablet     Refill:  1    fluticasone (FLONASE) 50 MCG/ACT nasal spray     Si sprays by Nasal route daily     Dispense:  3 each     Refill:  1    levothyroxine (SYNTHROID) 75 MCG tablet     Sig: TAKE 1

## 2024-07-11 ENCOUNTER — PATIENT MESSAGE (OUTPATIENT)
Dept: FAMILY MEDICINE CLINIC | Age: 74
End: 2024-07-11

## 2024-07-11 LAB
ESTIMATED AVERAGE GLUCOSE: 120 MG/DL
FOLATE: 14.4 NG/ML (ref 4.8–24.2)
HBA1C MFR BLD: 5.8 % (ref 4–6)
VITAMIN B-12: 646 PG/ML (ref 232–1245)

## 2024-07-12 NOTE — TELEPHONE ENCOUNTER
From: Juliette Peña  To: Mague Lopez  Sent: 7/11/2024 5:12 PM EDT  Subject: The return telephone message.     When I called back to get the information about the WBC and RBC the office was closed. Realized that you are usually not in on Fridays.    Juliette

## 2024-07-17 ENCOUNTER — TELEPHONE (OUTPATIENT)
Dept: FAMILY MEDICINE CLINIC | Age: 74
End: 2024-07-17

## 2024-07-17 DIAGNOSIS — Z12.39 ENCOUNTER FOR SCREENING FOR MALIGNANT NEOPLASM OF BREAST, UNSPECIFIED SCREENING MODALITY: Primary | ICD-10-CM

## 2024-07-17 DIAGNOSIS — M62.830 BACK MUSCLE SPASM: ICD-10-CM

## 2024-07-17 RX ORDER — CYCLOBENZAPRINE HCL 5 MG
5 TABLET ORAL 3 TIMES DAILY PRN
Qty: 30 TABLET | Refills: 0
Start: 2024-07-17 | End: 2024-07-27

## 2024-07-17 NOTE — TELEPHONE ENCOUNTER
Patient calling to get an order for mammogram went to schedule it and they told patient she needed an order. Please and thank you.

## 2024-07-18 ENCOUNTER — OFFICE VISIT (OUTPATIENT)
Dept: FAMILY MEDICINE CLINIC | Age: 74
End: 2024-07-18
Payer: MEDICARE

## 2024-07-18 VITALS
OXYGEN SATURATION: 95 % | TEMPERATURE: 99.3 F | HEART RATE: 82 BPM | HEIGHT: 69 IN | BODY MASS INDEX: 26.51 KG/M2 | WEIGHT: 179 LBS

## 2024-07-18 DIAGNOSIS — L82.0 SEBORRHEIC KERATOSES, INFLAMED: Primary | ICD-10-CM

## 2024-07-18 PROCEDURE — 17110 DESTRUCTION B9 LES UP TO 14: CPT | Performed by: FAMILY MEDICINE

## 2024-07-18 NOTE — PROGRESS NOTES
Diagnosis Orders   1. Seborrheic keratoses, inflamed  NV DESTRUCTION BENIGN LESIONS UP TO 14          Return for Routine skin cancer screening.    Orders Placed This Encounter   Procedures    NV DESTRUCTION BENIGN LESIONS UP TO 14       Juliette was seen today for mole.    Diagnoses and all orders for this visit:    Seborrheic keratoses, inflamed  -     NV DESTRUCTION BENIGN LESIONS UP TO 14        Return for Routine skin cancer screening.    Patient Instructions   Cryotherapy instructions    Post op instructions given. A printed copy provided.    It is best to leave blisters alone if they form for the first 1-3 days to allow the desired damaged tissue (precancer lesion, wart, or whatever lesion is being removed) to separate from healthy tissue.     The area should be covered with a bandage to prevent blister breakage and dirt exposure.      The wounds should remain dry while there is a blister, therefore if this is a sweaty location, like the foot ,you may need to change clothing, such as socks, multiple times per day.       Remember that the reason for cryosurgery to be done is to damage skin that is not normal so that it will be removed.  Therefore the area could be red or pink, can sting or burn for the first day or 2, will appear raw when the skin sloughs off.    When the blister(s) pop or patient removes the top as instructed between day 3-5, apply antibiotic (NOT triple antibiotic, one brand is Neosporin) ointment, usually Bacitracin, and a bandage to affected area(s).  The ointment should be applied to the open area as long as it is not covered with skin.  Exposed tissue is meant to be moist.      Once a scab is formed the patient may stop applying ointment.  The scab may appear yellow while moist, don't confuse this with infection.  If the wound is infection pus will drain from the site. If this treatment was for a large wart you may note that a plug of skin may fall out of the area that was treated.  That is

## 2024-07-30 ASSESSMENT — ENCOUNTER SYMPTOMS: COLOR CHANGE: 1

## 2024-08-12 ENCOUNTER — APPOINTMENT (OUTPATIENT)
Dept: ORTHOPEDIC SURGERY | Facility: CLINIC | Age: 74
End: 2024-08-12
Payer: MEDICARE

## 2024-08-12 DIAGNOSIS — M19.019 GLENOHUMERAL ARTHRITIS: Primary | ICD-10-CM

## 2024-08-12 PROCEDURE — 1157F ADVNC CARE PLAN IN RCRD: CPT | Performed by: ORTHOPAEDIC SURGERY

## 2024-08-12 PROCEDURE — 99213 OFFICE O/P EST LOW 20 MIN: CPT | Performed by: ORTHOPAEDIC SURGERY

## 2024-08-12 NOTE — PROGRESS NOTES
History of present illness: Patient with known bilateral shoulder arthritis    Fortunately the left shoulder did well with an office-based injection    The right shoulder did not respond as well    Physical exam:    General: No acute distress or breathing difficulty or discomfort, pleasant and cooperative with the examination.    Extremities: The right shoulder was inspected and was found to have no obvious deformity.  There was tenderness to touch over the lateral edges of the shoulder over the rotator cuff insertion.  Active forward flexion 110 degrees, external rotation to 50 degrees, abduction to 45 degrees, and internal rotation to the level of L2.    At this time the shoulder is neurovascular tact and neurosensory intact.  Motor intact C5-T1.  There was no obvious neck pain or radiculopathy noted.  There was no gross instability or adhesive capsulitis symptoms.  There was no evidence of apprehension or apprehension suppression.    Strength was tested in 4 planes with weakness in the supraspinatus strength testing and external rotation position.  There was no strength deficit in internal rotation.  Impingement signs were positive both supine and standing for impingement test type I and II.  There was mild pain over the bicipital groove with a positive speeds sign    Diagnostic studies: No new X    Impression: Known bilateral shoulder arthro-    Plan: Now for fluoroscopy guided intra-articular injection right shoulder we will see her back in 6 to 8 weeks we will need to time up her injections for her trip that she is going on in the holiday season to [facility]    Will see how she responds to the fluoro-guided injection and then follow-up from there we look for to seeing her back in the office in 2 months no x-rays needed

## 2024-08-16 ENCOUNTER — TELEPHONE (OUTPATIENT)
Dept: FAMILY MEDICINE CLINIC | Age: 74
End: 2024-08-16

## 2024-08-16 DIAGNOSIS — Z12.39 ENCOUNTER FOR SCREENING FOR MALIGNANT NEOPLASM OF BREAST, UNSPECIFIED SCREENING MODALITY: Primary | ICD-10-CM

## 2024-08-16 DIAGNOSIS — M19.011 OSTEOARTHRITIS OF RIGHT SHOULDER, UNSPECIFIED OSTEOARTHRITIS TYPE: Primary | ICD-10-CM

## 2024-08-19 ENCOUNTER — TELEPHONE (OUTPATIENT)
Dept: INTERVENTIONAL RADIOLOGY/VASCULAR | Age: 74
End: 2024-08-19

## 2024-08-19 NOTE — TELEPHONE ENCOUNTER
ZHENG was given this information via phone conversation - voiced understanding  2.  Spoke to KHRIS in SPE[facility] to schedule procedure  >  RT SHOULDER FLUORO GUIDED STEROID INJ is scheduled on 8/21/24 @ 2PM   >  You will need to arrive at 1:30PM (from home) and check in at the Diagnostic Imaging Check In desk.   >  Patient to have PT/INR and CBC drawn anytime between now and 1 day prior to procedure

## 2024-08-20 ENCOUNTER — HOSPITAL ENCOUNTER (OUTPATIENT)
Dept: WOMENS IMAGING | Age: 74
Discharge: HOME | End: 2024-08-22
Payer: MEDICARE

## 2024-08-20 ENCOUNTER — PRE-PROCEDURE TELEPHONE (OUTPATIENT)
Dept: INTERVENTIONAL RADIOLOGY/VASCULAR | Age: 74
End: 2024-08-20

## 2024-08-20 DIAGNOSIS — M19.011 OSTEOARTHRITIS OF RIGHT SHOULDER, UNSPECIFIED OSTEOARTHRITIS TYPE: ICD-10-CM

## 2024-08-20 DIAGNOSIS — Z12.31 OTHER SCREENING MAMMOGRAM: ICD-10-CM

## 2024-08-20 LAB
INR PPP: 1
PROTHROMBIN TIME: 13 SEC (ref 12.3–14.9)

## 2024-08-20 PROCEDURE — 77063 BREAST TOMOSYNTHESIS BI: CPT

## 2024-08-20 NOTE — FLOWSHEET NOTE
Call to patient to review the following information:   Procedure scheduled on 8/21/24 @ 2PM   >  You will need to arrive at 1:30PM (from home) and check in at the Diagnostic Imaging Check In desk.   >  Patient to have PT/INR and CBC drawn anytime between now and 1 day prior to procedure     No answer, above info left on  with radiology number for call back/questions. Electronically signed by Ginger Sierra RN on 8/20/2024 at 9:53 AM

## 2024-08-21 ENCOUNTER — HOSPITAL ENCOUNTER (OUTPATIENT)
Dept: INTERVENTIONAL RADIOLOGY/VASCULAR | Age: 74
Discharge: HOME | End: 2024-08-23
Payer: MEDICARE

## 2024-08-21 VITALS
DIASTOLIC BLOOD PRESSURE: 84 MMHG | RESPIRATION RATE: 16 BRPM | OXYGEN SATURATION: 98 % | HEART RATE: 67 BPM | SYSTOLIC BLOOD PRESSURE: 164 MMHG

## 2024-08-21 DIAGNOSIS — M19.011 OSTEOARTHRITIS OF RIGHT SHOULDER, UNSPECIFIED OSTEOARTHRITIS TYPE: ICD-10-CM

## 2024-08-21 PROCEDURE — 6360000002 HC RX W HCPCS: Performed by: RADIOLOGY

## 2024-08-21 PROCEDURE — 77002 NEEDLE LOCALIZATION BY XRAY: CPT

## 2024-08-21 PROCEDURE — 20610 DRAIN/INJ JOINT/BURSA W/O US: CPT

## 2024-08-21 PROCEDURE — 6360000004 HC RX CONTRAST MEDICATION: Performed by: RADIOLOGY

## 2024-08-21 PROCEDURE — 2500000003 HC RX 250 WO HCPCS: Performed by: RADIOLOGY

## 2024-08-21 RX ORDER — LIDOCAINE HYDROCHLORIDE 20 MG/ML
INJECTION, SOLUTION INFILTRATION; PERINEURAL PRN
Status: COMPLETED | OUTPATIENT
Start: 2024-08-21 | End: 2024-08-21

## 2024-08-21 RX ORDER — IOPAMIDOL 408 MG/ML
INJECTION, SOLUTION INTRATHECAL PRN
Status: COMPLETED | OUTPATIENT
Start: 2024-08-21 | End: 2024-08-21

## 2024-08-21 RX ORDER — TRIAMCINOLONE ACETONIDE 40 MG/ML
INJECTION, SUSPENSION INTRA-ARTICULAR; INTRAMUSCULAR PRN
Status: COMPLETED | OUTPATIENT
Start: 2024-08-21 | End: 2024-08-21

## 2024-08-21 RX ORDER — BUPIVACAINE HYDROCHLORIDE 5 MG/ML
INJECTION, SOLUTION PERINEURAL PRN
Status: COMPLETED | OUTPATIENT
Start: 2024-08-21 | End: 2024-08-21

## 2024-08-21 RX ADMIN — TRIAMCINOLONE ACETONIDE 80 MG: 40 INJECTION, SUSPENSION INTRA-ARTICULAR; INTRAMUSCULAR at 14:21

## 2024-08-21 RX ADMIN — IOPAMIDOL 1 ML: 408 INJECTION, SOLUTION INTRATHECAL at 14:20

## 2024-08-21 RX ADMIN — BUPIVACAINE HYDROCHLORIDE 3 ML: 5 INJECTION, SOLUTION PERINEURAL at 14:21

## 2024-08-21 RX ADMIN — LIDOCAINE HYDROCHLORIDE 7 ML: 20 INJECTION, SOLUTION INFILTRATION; PERINEURAL at 14:19

## 2024-08-21 NOTE — OR NURSING
NO SEDATION      1408 Pt brought to specials. Pt alert and oriented x 4. Procedure explained and consent signed. Minimally assisted pt onto table in supine position. States pain 4/10. History, allergies and medications verified. Vital signs obtained.    1412 Time out performed.     1413 Dr Berger marked right shoulder site using fluoro imaging. Site cleansed with chloraprep by DANDY. After 3 minute dry time, prepped and draped in a sterile fashion by Dr. Berger .    1419 2% lidocaine used to numb procedure site, see eMar.     1420 Spinal needle 20G x 3.5\" inserted and contrast given to confirm placement using fluoro guidance, see eMar.     1421 Medication cocktail instilled through spinal needle under fluoro guidance, see eMar. Pt tolerating procedure well and verbal support given throughout.     1422 Needle removed, band aid applied, no bleeding noted.     1425 Pt assisted off table. States pain has improved. Discharge instructions provided and pt verbalized understanding.  Accompanied pt to changing room for d/c home.

## 2024-10-21 ENCOUNTER — APPOINTMENT (OUTPATIENT)
Dept: ORTHOPEDIC SURGERY | Facility: CLINIC | Age: 74
End: 2024-10-21
Payer: MEDICARE

## 2024-10-21 DIAGNOSIS — M19.019 GLENOHUMERAL ARTHRITIS: Primary | ICD-10-CM

## 2024-10-21 PROCEDURE — 1157F ADVNC CARE PLAN IN RCRD: CPT | Performed by: PHYSICIAN ASSISTANT

## 2024-10-21 PROCEDURE — 99024 POSTOP FOLLOW-UP VISIT: CPT | Performed by: PHYSICIAN ASSISTANT

## 2024-10-21 NOTE — PROGRESS NOTES
History of present illness patient received a fluoroscopic guided cortisone injection into the right shoulder in August.  She states she has excellent relief.  She is continuing to do light activities including swimming without pain.      Physical exam:      General: No acute distress or breathing difficulty or discomfort, pleasant and cooperative with the examination.    Extremities: Right shoulder patient has forward flexion up to 140 degrees abduction of 80 degrees external rotation is 60 degrees internal rotation L4-5      Impression: Right shoulder degenerative joint disease    Plan: Patient will continue low impact exercises.  She has a couple of trips planned at the end of the year including going to [employer] in [place].  If pain returns patient would call and we could schedule a fluoroscopic cortisone injection right shoulder after December 11.

## 2025-01-09 ENCOUNTER — OFFICE VISIT (OUTPATIENT)
Dept: FAMILY MEDICINE CLINIC | Age: 75
End: 2025-01-09

## 2025-01-09 VITALS
BODY MASS INDEX: 26.81 KG/M2 | OXYGEN SATURATION: 100 % | DIASTOLIC BLOOD PRESSURE: 58 MMHG | TEMPERATURE: 98.6 F | HEIGHT: 69 IN | WEIGHT: 181 LBS | SYSTOLIC BLOOD PRESSURE: 102 MMHG | HEART RATE: 101 BPM

## 2025-01-09 DIAGNOSIS — J02.9 ACUTE PHARYNGITIS, UNSPECIFIED ETIOLOGY: Primary | ICD-10-CM

## 2025-01-09 DIAGNOSIS — J02.9 ACUTE PHARYNGITIS, UNSPECIFIED ETIOLOGY: ICD-10-CM

## 2025-01-09 DIAGNOSIS — B37.0 THRUSH: ICD-10-CM

## 2025-01-09 LAB — S PYO AG THROAT QL: NORMAL

## 2025-01-09 RX ORDER — AZITHROMYCIN 250 MG/1
TABLET, FILM COATED ORAL
Qty: 6 TABLET | Refills: 0 | Status: SHIPPED | OUTPATIENT
Start: 2025-01-09 | End: 2025-01-19

## 2025-01-09 RX ORDER — FLUCONAZOLE 150 MG/1
150 TABLET ORAL ONCE
Qty: 1 TABLET | Refills: 0 | Status: SHIPPED | OUTPATIENT
Start: 2025-01-09 | End: 2025-01-09

## 2025-01-09 SDOH — ECONOMIC STABILITY: FOOD INSECURITY: WITHIN THE PAST 12 MONTHS, THE FOOD YOU BOUGHT JUST DIDN'T LAST AND YOU DIDN'T HAVE MONEY TO GET MORE.: NEVER TRUE

## 2025-01-09 SDOH — ECONOMIC STABILITY: FOOD INSECURITY: WITHIN THE PAST 12 MONTHS, YOU WORRIED THAT YOUR FOOD WOULD RUN OUT BEFORE YOU GOT MONEY TO BUY MORE.: NEVER TRUE

## 2025-01-09 ASSESSMENT — PATIENT HEALTH QUESTIONNAIRE - PHQ9
6. FEELING BAD ABOUT YOURSELF - OR THAT YOU ARE A FAILURE OR HAVE LET YOURSELF OR YOUR FAMILY DOWN: NOT AT ALL
2. FEELING DOWN, DEPRESSED OR HOPELESS: NOT AT ALL
SUM OF ALL RESPONSES TO PHQ QUESTIONS 1-9: 0
SUM OF ALL RESPONSES TO PHQ QUESTIONS 1-9: 0
4. FEELING TIRED OR HAVING LITTLE ENERGY: NOT AT ALL
10. IF YOU CHECKED OFF ANY PROBLEMS, HOW DIFFICULT HAVE THESE PROBLEMS MADE IT FOR YOU TO DO YOUR WORK, TAKE CARE OF THINGS AT HOME, OR GET ALONG WITH OTHER PEOPLE: NOT DIFFICULT AT ALL
3. TROUBLE FALLING OR STAYING ASLEEP: NOT AT ALL
SUM OF ALL RESPONSES TO PHQ QUESTIONS 1-9: 0
1. LITTLE INTEREST OR PLEASURE IN DOING THINGS: NOT AT ALL
5. POOR APPETITE OR OVEREATING: NOT AT ALL
SUM OF ALL RESPONSES TO PHQ9 QUESTIONS 1 & 2: 0
SUM OF ALL RESPONSES TO PHQ QUESTIONS 1-9: 0
8. MOVING OR SPEAKING SO SLOWLY THAT OTHER PEOPLE COULD HAVE NOTICED. OR THE OPPOSITE, BEING SO FIGETY OR RESTLESS THAT YOU HAVE BEEN MOVING AROUND A LOT MORE THAN USUAL: NOT AT ALL
9. THOUGHTS THAT YOU WOULD BE BETTER OFF DEAD, OR OF HURTING YOURSELF: NOT AT ALL
7. TROUBLE CONCENTRATING ON THINGS, SUCH AS READING THE NEWSPAPER OR WATCHING TELEVISION: NOT AT ALL

## 2025-01-09 ASSESSMENT — ENCOUNTER SYMPTOMS
SORE THROAT: 1
EYE REDNESS: 0
VOMITING: 0
DIARRHEA: 0
RHINORRHEA: 1
EYE DISCHARGE: 0
NAUSEA: 0
COUGH: 0
SHORTNESS OF BREATH: 0
ABDOMINAL PAIN: 0

## 2025-01-09 NOTE — PROGRESS NOTES
[facility]  [address], [address]  [facility] [address]  Dept: [phone]  Dept Fax: [phone]  Loc: [phone]     2025    Visit type: Follow up    Reason for Visit: Pharyngitis (Pt c/o  white patches in the throat, tenderness in bilateral side of neck.  Sx yesterday. Denies cough, fever, chills. )         Patient: Juliette Peña is a 74 y.o. female   HPI: 74-year-old female presents with sore throat and white patches in the back of her throat.  Patient also reports having tenderness of the bilateral sides of her neck.    ASSESSMENT/PLAN   Acute pharyngitis, unspecified etiology  -     azithromycin (ZITHROMAX) 250 MG tablet; 500mg on day 1 followed by 250mg on days 2 - 5, Disp-6 tablet, R-0Normal  -     POCT rapid strep A  -     Culture, Throat; Future  -     Magic Mouthwash (MIRACLE MOUTHWASH); Swish and spit 5 mLs 4 times daily as needed for Irritation 1:1:1 benadryl, viscous lidocaine, maalox; 300ml, Disp-1 each, R-0Normal  Thrush  -     fluconazole (DIFLUCAN) 150 MG tablet; Take 1 tablet by mouth once for 1 dose, Disp-1 tablet, R-0Normal     -discussed salt water gargles, throat lozenges, throat spray    -Adverse effects of medications prescribed were discussed, patient acknowledged understanding.  -Patient was advised to let me know if there is no improvement in the next 7 to 10 days. Discussed red flags warranting decision to go to the emergency department and patient understood.       Orders Placed This Encounter   Medications    azithromycin (ZITHROMAX) 250 MG tablet     Simg on day 1 followed by 250mg on days 2 - 5     Dispense:  6 tablet     Refill:  0    Magic Mouthwash (MIRACLE MOUTHWASH)     Sig: Swish and spit 5 mLs 4 times daily as needed for Irritation 1:1:1 benadryl, viscous lidocaine, maalox; 300ml     Dispense:  1 each     Refill:  0    fluconazole (DIFLUCAN) 150 MG tablet     Sig: Take 1 tablet by mouth once

## 2025-01-11 LAB — BACTERIA THROAT AEROBE CULT: NORMAL

## 2025-01-11 SDOH — HEALTH STABILITY: PHYSICAL HEALTH: ON AVERAGE, HOW MANY DAYS PER WEEK DO YOU ENGAGE IN MODERATE TO STRENUOUS EXERCISE (LIKE A BRISK WALK)?: 5 DAYS

## 2025-01-11 SDOH — HEALTH STABILITY: PHYSICAL HEALTH: ON AVERAGE, HOW MANY MINUTES DO YOU ENGAGE IN EXERCISE AT THIS LEVEL?: 50 MIN

## 2025-01-11 ASSESSMENT — LIFESTYLE VARIABLES
HOW OFTEN DO YOU HAVE SIX OR MORE DRINKS ON ONE OCCASION: 1
HOW OFTEN DO YOU HAVE A DRINK CONTAINING ALCOHOL: 2-3 TIMES A WEEK
HOW MANY STANDARD DRINKS CONTAINING ALCOHOL DO YOU HAVE ON A TYPICAL DAY: 1
HOW OFTEN DO YOU HAVE A DRINK CONTAINING ALCOHOL: 4
HOW MANY STANDARD DRINKS CONTAINING ALCOHOL DO YOU HAVE ON A TYPICAL DAY: 1 OR 2

## 2025-01-11 ASSESSMENT — PATIENT HEALTH QUESTIONNAIRE - PHQ9
SUM OF ALL RESPONSES TO PHQ QUESTIONS 1-9: 1
2. FEELING DOWN, DEPRESSED OR HOPELESS: SEVERAL DAYS
SUM OF ALL RESPONSES TO PHQ QUESTIONS 1-9: 1
1. LITTLE INTEREST OR PLEASURE IN DOING THINGS: NOT AT ALL
SUM OF ALL RESPONSES TO PHQ QUESTIONS 1-9: 1
SUM OF ALL RESPONSES TO PHQ QUESTIONS 1-9: 1
SUM OF ALL RESPONSES TO PHQ9 QUESTIONS 1 & 2: 1

## 2025-01-13 ENCOUNTER — OFFICE VISIT (OUTPATIENT)
Dept: FAMILY MEDICINE CLINIC | Age: 75
End: 2025-01-13

## 2025-01-13 VITALS
TEMPERATURE: 98 F | WEIGHT: 181.5 LBS | OXYGEN SATURATION: 99 % | SYSTOLIC BLOOD PRESSURE: 130 MMHG | HEART RATE: 74 BPM | DIASTOLIC BLOOD PRESSURE: 80 MMHG | HEIGHT: 69 IN | BODY MASS INDEX: 26.88 KG/M2

## 2025-01-13 DIAGNOSIS — B37.0 THRUSH: ICD-10-CM

## 2025-01-13 DIAGNOSIS — Z00.00 MEDICARE ANNUAL WELLNESS VISIT, SUBSEQUENT: Primary | ICD-10-CM

## 2025-01-13 DIAGNOSIS — D64.9 ANEMIA, UNSPECIFIED TYPE: ICD-10-CM

## 2025-01-13 DIAGNOSIS — E78.5 HYPERLIPIDEMIA, UNSPECIFIED HYPERLIPIDEMIA TYPE: ICD-10-CM

## 2025-01-13 DIAGNOSIS — J30.9 ALLERGIC RHINITIS, UNSPECIFIED SEASONALITY, UNSPECIFIED TRIGGER: ICD-10-CM

## 2025-01-13 DIAGNOSIS — F32.A ANXIETY AND DEPRESSION: ICD-10-CM

## 2025-01-13 DIAGNOSIS — R73.03 PREDIABETES: ICD-10-CM

## 2025-01-13 DIAGNOSIS — E03.9 HYPOTHYROIDISM, UNSPECIFIED TYPE: ICD-10-CM

## 2025-01-13 DIAGNOSIS — F41.9 ANXIETY AND DEPRESSION: ICD-10-CM

## 2025-01-13 DIAGNOSIS — M79.89 LEG SWELLING: ICD-10-CM

## 2025-01-13 DIAGNOSIS — K21.9 GASTROESOPHAGEAL REFLUX DISEASE, UNSPECIFIED WHETHER ESOPHAGITIS PRESENT: ICD-10-CM

## 2025-01-13 LAB
ANION GAP SERPL CALCULATED.3IONS-SCNC: 11 MEQ/L (ref 9–15)
BUN SERPL-MCNC: 16 MG/DL (ref 8–23)
CALCIUM SERPL-MCNC: 9.4 MG/DL (ref 8.5–9.9)
CHLORIDE SERPL-SCNC: 97 MEQ/L (ref 95–107)
CO2 SERPL-SCNC: 29 MEQ/L (ref 20–31)
CREAT SERPL-MCNC: 0.8 MG/DL (ref 0.5–0.9)
ERYTHROCYTE [DISTWIDTH] IN BLOOD BY AUTOMATED COUNT: 12.6 % (ref 11.5–14.5)
GLUCOSE SERPL-MCNC: 98 MG/DL (ref 70–99)
HCT VFR BLD AUTO: 44 % (ref 37–47)
HGB BLD-MCNC: 14.1 G/DL (ref 12–16)
MCH RBC QN AUTO: 30.1 PG (ref 27–31.3)
MCHC RBC AUTO-ENTMCNC: 32 % (ref 33–37)
MCV RBC AUTO: 93.8 FL (ref 79.4–94.8)
PLATELET # BLD AUTO: 356 K/UL (ref 130–400)
POTASSIUM SERPL-SCNC: 4.4 MEQ/L (ref 3.4–4.9)
RBC # BLD AUTO: 4.69 M/UL (ref 4.2–5.4)
SODIUM SERPL-SCNC: 137 MEQ/L (ref 135–144)
TSH REFLEX: 1.8 UIU/ML (ref 0.44–3.86)
WBC # BLD AUTO: 5.9 K/UL (ref 4.8–10.8)

## 2025-01-13 RX ORDER — ESCITALOPRAM OXALATE 5 MG/1
TABLET ORAL
Qty: 90 TABLET | Refills: 1 | Status: SHIPPED | OUTPATIENT
Start: 2025-01-13

## 2025-01-13 RX ORDER — HYDROCHLOROTHIAZIDE 25 MG/1
25 TABLET ORAL EVERY MORNING
Qty: 90 TABLET | Refills: 1 | Status: SHIPPED | OUTPATIENT
Start: 2025-01-13

## 2025-01-13 RX ORDER — DEXLANSOPRAZOLE 60 MG/1
60 CAPSULE, DELAYED RELEASE ORAL DAILY
Qty: 90 CAPSULE | Refills: 1 | Status: SHIPPED | OUTPATIENT
Start: 2025-01-13

## 2025-01-13 RX ORDER — NYSTATIN 100000 [USP'U]/ML
500000 SUSPENSION ORAL 4 TIMES DAILY
Qty: 200 ML | Refills: 0 | Status: SHIPPED | OUTPATIENT
Start: 2025-01-13 | End: 2025-01-23

## 2025-01-13 RX ORDER — METHYLPREDNISOLONE 4 MG/1
TABLET ORAL
Qty: 21 TABLET | Refills: 0 | Status: SHIPPED | OUTPATIENT
Start: 2025-01-13 | End: 2025-01-19

## 2025-01-13 RX ORDER — SIMVASTATIN 20 MG
TABLET ORAL
Qty: 90 TABLET | Refills: 1 | Status: SHIPPED | OUTPATIENT
Start: 2025-01-13

## 2025-01-13 RX ORDER — FLUTICASONE PROPIONATE 50 MCG
2 SPRAY, SUSPENSION (ML) NASAL DAILY
Qty: 3 EACH | Refills: 1 | Status: SHIPPED | OUTPATIENT
Start: 2025-01-13

## 2025-01-13 RX ORDER — LEVOTHYROXINE SODIUM 75 UG/1
TABLET ORAL
Qty: 90 TABLET | Refills: 1 | Status: SHIPPED | OUTPATIENT
Start: 2025-01-13

## 2025-01-13 RX ORDER — MONTELUKAST SODIUM 10 MG/1
TABLET ORAL
Qty: 90 TABLET | Refills: 1 | Status: SHIPPED | OUTPATIENT
Start: 2025-01-13

## 2025-01-13 NOTE — PATIENT INSTRUCTIONS

## 2025-01-13 NOTE — PROGRESS NOTES
presents for evaluation of mild anemia, leg swelling, oral thrush, shoulder and back pain, and neck pain.    She has been experiencing leg swelling, predominantly during travel, which she attributes to increased salt intake, consumption of additional wine, and prolonged walking in hot weather. She utilizes compression socks during flights but finds them uncomfortable for extended use. She does not believe her symptoms are cardiac-related. She recently returned from a trip to [facility], where she was physically active while caring for her three grandchildren. She also reports occasional leg swelling even outside of travel contexts.    She has a history of shoulder and back pain, for which she received a series of cortisone injections over the past summer. An injection administered to the back of her arm was ineffective, necessitating another injection to the shoulder joint, which provided relief. However, the effects of these injections are beginning to diminish. She also received an injection to her back from Dr. Davis, which was beneficial. She suspects that inflammation may have contributed to her symptoms. She expresses concern about an upcoming trip to [place], where she anticipates increased physical activity, including hiking.    She experienced a head injury during her trip to [facility], resulting in neck pain. She took prednisone, which alleviated her symptoms. Her chiropractor noted slight swelling and worked on her range of motion, which is now normal. She does not believe she sustained a vertebral fracture.    She has been diagnosed with oral thrush, confirmed by a negative strep test and a subsequent test that ruled out other conditions. She describes the appearance of her mouth as cottony and unpleasant. She was prescribed fluconazole and erythromycin, which led to a significant improvement within 24 hours. She has been using an inhaler and took prednisone during her travels, which resulted in dry mouth.

## 2025-01-14 LAB
ESTIMATED AVERAGE GLUCOSE: 117 MG/DL
HBA1C MFR BLD: 5.7 % (ref 4–6)
IRON % SATURATION: 24 % (ref 20–55)
IRON: 101 UG/DL (ref 37–145)
TOTAL IRON BINDING CAPACITY: 416 UG/DL (ref 250–450)
UNSATURATED IRON BINDING CAPACITY: 315 UG/DL (ref 112–347)

## 2025-01-17 DIAGNOSIS — J30.9 ALLERGIC RHINITIS, UNSPECIFIED SEASONALITY, UNSPECIFIED TRIGGER: ICD-10-CM

## 2025-01-17 RX ORDER — FLUTICASONE PROPIONATE 50 MCG
2 SPRAY, SUSPENSION (ML) NASAL DAILY
Qty: 48 G | Refills: 1 | OUTPATIENT
Start: 2025-01-17

## 2025-01-17 RX ORDER — MONTELUKAST SODIUM 10 MG/1
TABLET ORAL
Qty: 90 TABLET | Refills: 1 | OUTPATIENT
Start: 2025-01-17

## 2025-01-28 ENCOUNTER — HOSPITAL ENCOUNTER (OUTPATIENT)
Dept: RADIOLOGY | Facility: CLINIC | Age: 75
Discharge: HOME | End: 2025-01-28
Payer: MEDICARE

## 2025-01-28 ENCOUNTER — APPOINTMENT (OUTPATIENT)
Dept: ORTHOPEDIC SURGERY | Facility: CLINIC | Age: 75
End: 2025-01-28
Payer: MEDICARE

## 2025-01-28 DIAGNOSIS — M54.10 BACK PAIN WITH RADICULOPATHY: ICD-10-CM

## 2025-01-28 DIAGNOSIS — M54.10 BACK PAIN WITH RADICULOPATHY: Primary | ICD-10-CM

## 2025-01-28 DIAGNOSIS — M75.32 CALCIFIC TENDINITIS OF LEFT SHOULDER: ICD-10-CM

## 2025-01-28 PROCEDURE — 72110 X-RAY EXAM L-2 SPINE 4/>VWS: CPT | Performed by: RADIOLOGY

## 2025-01-28 PROCEDURE — 20610 DRAIN/INJ JOINT/BURSA W/O US: CPT | Performed by: PHYSICIAN ASSISTANT

## 2025-01-28 PROCEDURE — 72120 X-RAY BEND ONLY L-S SPINE: CPT

## 2025-01-28 PROCEDURE — 1157F ADVNC CARE PLAN IN RCRD: CPT | Performed by: PHYSICIAN ASSISTANT

## 2025-01-28 PROCEDURE — 99214 OFFICE O/P EST MOD 30 MIN: CPT | Performed by: PHYSICIAN ASSISTANT

## 2025-01-28 RX ORDER — TRIAMCINOLONE ACETONIDE 40 MG/ML
2.5 INJECTION, SUSPENSION INTRA-ARTICULAR; INTRAMUSCULAR
Status: COMPLETED | OUTPATIENT
Start: 2025-01-28 | End: 2025-01-28

## 2025-01-28 RX ORDER — METHYLPREDNISOLONE 4 MG/1
TABLET ORAL
Qty: 1 EACH | Refills: 0 | Status: SHIPPED | OUTPATIENT
Start: 2025-01-28

## 2025-01-28 RX ORDER — LIDOCAINE HYDROCHLORIDE 10 MG/ML
0.5 INJECTION, SOLUTION INFILTRATION; PERINEURAL
Status: COMPLETED | OUTPATIENT
Start: 2025-01-28 | End: 2025-01-28

## 2025-01-28 RX ADMIN — LIDOCAINE HYDROCHLORIDE 0.5 ML: 10 INJECTION, SOLUTION INFILTRATION; PERINEURAL at 10:00

## 2025-01-28 RX ADMIN — TRIAMCINOLONE ACETONIDE 2.5 MG: 40 INJECTION, SUSPENSION INTRA-ARTICULAR; INTRAMUSCULAR at 10:00

## 2025-01-28 NOTE — PROGRESS NOTES
Established patient the practice seen in June for some back pain issues and some left shoulder issues.  We injected her left shoulder and it felt good.  She also had an injection after that and her right shoulder with Dr. Linda did not do much at all.  She still complains of back pain and some leg symptoms.  We went over her MRI showing central stenosis moderate L2-L5 with foraminal stenosis at multiple levels.  She does not want to pursue any type of back surgery.  She would like to just try another injection in the left shoulder she is traveling to [place] this weekend.  No bowel or bladder complaints no saddle anesthesia no gait or balance changes.  She complains of pain and decreased range of motion left shoulder.  Sometimes some shooting pain down the arms.    Physical exam: Well-nourished, well kept.  No lymphangitis or lymphadenopathy in the examined extremities.  Good perfusion to the lower extremities bilaterally.  Dorsalis pedis pulses 2+.  Capillary refill to the digits brisk.  No distal edema.  Patient ambulating with no major difficulty.  Moving lower extremities without any major difficulty motor strength intact.  Decreased range of motion left upper extremity.  Mild positive Neer's and Nava.  No redness, abrasions, or lesions on the lower extremities bilaterally.  Patient neurologically intact    X-ray: X-ray lumbar spine taken today and reviewed updated images shows a scoliosis about 1314 degrees spondylolisthesis at L4 on 5 degenerative disc at L5-S1 L3-4.  No fractures dislocations or bony lytic lesions.    Assessment: This patient with some shoulder issues back issues.  She is leaving for out of town she wanted to see if she can get another shoulder injection we discussed all the risk benefits the options of the injection.    Plan: We will inject the left shoulder today.  Will also send a Medrol Dosepak patient will take with her on vacation in case it acts up she going to be gone for a  couple of months    L Inj/Asp: L subacromial bursa on 1/28/2025 10:00 AM  Details: lateral approach  Medications: 2.5 mg triamcinolone acetonide 40 mg/mL; 0.5 mL lidocaine 10 mg/mL (1 %)  Outcome: tolerated well, no immediate complications    Risks/benefits of a cortisone injection including infection, local skin irritation, skin atrophy, calcification, continued pain/discomfort, elevated blood sugar, burning, failure to relieve pain, and possible leg infection.  Postop discomfort can be alleviated with additional medications/ice/elevation/rest over the first 24 hours as recommended.  After explaining these issues to the patient, it was understood.  The injection site was sprayed with ethyl chloride.  9 cc lidocaine 1 cc Kenalog ejected into the left subacromial bursa using a posterior approach.  Using sterile technique neurologically intact moving the extremity afterwards without any difficulty  Procedure, treatment alternatives, risks and benefits explained, specific risks discussed. Consent was given by the patient. Immediately prior to procedure a time out was called to verify the correct patient, procedure, equipment, support staff and site/side marked as required. Patient was prepped and draped in the usual sterile fashion.       Risks/benefits of a cortisone injection including infection, local skin irritation, skin atrophy, calcification, continued pain/discomfort, elevated blood sugar, burning, failure to relieve pain, and possible leg infection.  Postop discomfort can be alleviated with additional medications/ice/elevation/rest over the first 24 hours as recommended.  After explaining these issues to the patient, it was understood.  The injection site was sprayed with ethyl chloride.

## 2025-04-07 DIAGNOSIS — K59.00 CONSTIPATION, UNSPECIFIED: ICD-10-CM

## 2025-04-08 RX ORDER — DOCUSATE SODIUM 100 MG/1
100 CAPSULE, LIQUID FILLED ORAL 2 TIMES DAILY
Qty: 60 CAPSULE | Refills: 0 | Status: SHIPPED | OUTPATIENT
Start: 2025-04-08

## 2025-04-14 NOTE — PROGRESS NOTES
REASON FOR VISIT:  GERD and Constipation    HPI:  Tara Brown is a 74 y.o. female who presents for a follow up appointment     Patient here for follow up for constipation and GERD   She is currently moving bowels daily - small madyson - She is taking Dulcolax when traveling  . She eats bran muffins which helps at times - linzess is too expensive, she does not see good results with miralax but can't recall details  Denies BRB, melena, or mucous   Denies abdominal bloating or pain   She is having breakthrough GERD symptoms - more while she is traveling and eating different type of foods - Taking dexilant and Rolaids as needed (a few times a month) . Pain in chest from GERD happening around 1x per month   Pt reports a weight gain of 8lbs since Christmas break -  She is on a high fiber diet -        Med list notable for Dexilant, aspirin 325mg BID , dulcolax     Labs notable for    No fhx of CRC.       Prev endoscopic eval:   >> Colonoscopy 05/2023 . Recommended to repeat in 2026  Impression:            - The examined portion of the ileum was normal.                         - Diverticulosis in the sigmoid colon.                         - External hemorrhoids.                         - One 3 mm polyp in the rectum, removed with a cold                          biopsy forceps. Resected and retrieved.  FINAL DIAGNOSIS   A.  POLYP (RECTUM), EXCISIONAL BIOPSY:         - HYPERPLASTIC POLYP.         REVIEW OF SYSTEMS    Review of Systems   Constitutional: Positive for weight gain. Negative for chills and fever.   Cardiovascular:  Negative for chest pain.   Respiratory:  Negative for cough and shortness of breath.    Gastrointestinal:  Positive for constipation and heartburn. Negative for bloating, abdominal pain, diarrhea, dysphagia, excessive appetite, hematemesis, hematochezia, hemorrhoids, melena, nausea and vomiting.          Allergies   Allergen Reactions    Adhesive Unknown    House Dust Mite Unknown    Penicillins  Unknown    Sulfa (Sulfonamide Antibiotics) Unknown       Past Medical History:   Diagnosis Date    Gastro-esophageal reflux disease without esophagitis     Gastric reflux    Personal history of other diseases of the respiratory system     History of nasal polyp    Personal history of other diseases of the respiratory system     History of asthma    Personal history of other endocrine, nutritional and metabolic disease     History of high cholesterol    Unilateral primary osteoarthritis, right hip 2020    Primary osteoarthritis of right hip       Past Surgical History:   Procedure Laterality Date    BREAST LUMPECTOMY  2014    Breast Surgery Lumpectomy     SECTION, CLASSIC  2014     Section    FOOT SURGERY  2014    Foot Surgery    OTHER SURGICAL HISTORY  2019    Sinus surgery    TONSILLECTOMY  2014    Tonsillectomy With Adenoidectomy       No family history on file.    Social History     Tobacco Use    Smoking status: Former     Types: Cigarettes    Smokeless tobacco: Never   Substance Use Topics    Alcohol use: Yes     Comment: 1-2 drinks per week       Current Outpatient Medications   Medication Sig Dispense Refill    aspirin 325 mg tablet Take 1 tablet (325 mg) by mouth 2 times a day.      bisacodyl (Dulcolax) 5 mg EC tablet Take 1 tablet (5 mg) by mouth once daily as needed for constipation. Do not crush, chew, or split.      cetirizine (ZyrTEC) 10 mg chewable tablet Chew 1 tablet (10 mg) once daily.      dexlansoprazole (Dexilant) 60 mg DR capsule Take 1 capsule (60 mg) by mouth once daily. Do not crush or chew.      diazePAM (Valium) 5 mg tablet TAKE 1 TABLET ONE HOUR BEFORE APPOINTMENT, THEN TAKE ONE TABLET 20 MINUTES BEFORE PROCEDURE 2 tablet 0    docusate sodium (Colace) 100 mg capsule TAKE 1 CAPSULE BY MOUTH TWICE DAILY 60 capsule 0    escitalopram (Lexapro) 5 mg tablet Take 1 tablet (5 mg) by mouth once daily.      fluticasone (Flonase) 50 mcg/actuation  nasal spray Administer 1 spray into each nostril once daily. Shake gently. Before first use, prime pump. After use, clean tip and replace cap.      levothyroxine (Synthroid, Levoxyl) 75 mcg tablet Take 1 tablet (75 mcg) by mouth once daily.      linaCLOtide (Linzess) 72 mcg capsule Take by mouth.      magnesium 30 mg tablet Take 1 tablet (30 mg) by mouth 2 times a day.      methylPREDNISolone (Medrol Dospak) 4 mg tablets Use as directed by package instructions (Patient not taking: Reported on 4/15/2024) 21 tablet 0    methylPREDNISolone (Medrol Dospak) 4 mg tablets Follow schedule on package instructions 1 each 0    montelukast (Singulair) 10 mg tablet Take 1 tablet (10 mg) by mouth once daily at bedtime.      simvastatin (Zocor) 20 mg tablet Take 1 tablet (20 mg) by mouth once daily at bedtime.       No current facility-administered medications for this visit.       PHYSICAL EXAM:  There were no vitals taken for this visit.     Physical Exam  Constitutional:       Comments: Awake   HENT:      Head: Normocephalic.   Cardiovascular:      Rate and Rhythm: Normal rate and regular rhythm.   Pulmonary:      Effort: Pulmonary effort is normal.      Breath sounds: Normal breath sounds.   Abdominal:      General: Bowel sounds are normal.      Palpations: Abdomen is soft.   Neurological:      Mental Status: She is alert.   Psychiatric:         Mood and Affect: Mood normal.          ASSESSMENT  74-year-old female presents for follow-up of longstanding chronic constipation and heartburn.  She continues to have constipation especially when she travels.  She had been on Linzess in the past which worked but is cost prohibitive.  We discussed MiraLAX but she finds it to be cumbersome.  I would prefer her not to be on the Dulcolax on a regular basis given a stimulant laxative.  She will try the MiraLAX again as well as calculate her overall fiber intake.  In reviewing her diet she may be getting too much fiber.  We will schedule  her for an appointment with one of our nurse practitioners in 3 months if her symptoms are improved she can cancel it otherwise they can discuss potential options such as Linzess, Amitiza, Ibsrela.  She is due for her next colonoscopy in 2026 for surveillance.      Evaluation requested by Dr. Hilary Barillas, APRN-CNP.   My final recommendations will be communicated back to the requesting physician by way of shared Medical record or letter to requesting physician via fax.      Attending Note:   I have personally performed a face to face assessment of this Patient, which included an interview, physical exam and Assessment and Plan.  I have reviewed and confirmed the history and key findings as documented by the Medical Assistant and edited as appropriate.     Signature: Renata Ambriz MD    Date: 4/14/2025  Time: 2:19 PM

## 2025-04-15 ENCOUNTER — APPOINTMENT (OUTPATIENT)
Dept: GASTROENTEROLOGY | Facility: CLINIC | Age: 75
End: 2025-04-15
Payer: MEDICARE

## 2025-04-15 VITALS
BODY MASS INDEX: 27.25 KG/M2 | RESPIRATION RATE: 16 BRPM | DIASTOLIC BLOOD PRESSURE: 75 MMHG | HEIGHT: 69 IN | TEMPERATURE: 97.1 F | OXYGEN SATURATION: 98 % | HEART RATE: 75 BPM | SYSTOLIC BLOOD PRESSURE: 118 MMHG | WEIGHT: 184 LBS

## 2025-04-15 DIAGNOSIS — K21.9 GASTROESOPHAGEAL REFLUX DISEASE WITHOUT ESOPHAGITIS: ICD-10-CM

## 2025-04-15 DIAGNOSIS — K59.04 CHRONIC IDIOPATHIC CONSTIPATION: Primary | ICD-10-CM

## 2025-04-15 PROCEDURE — G2211 COMPLEX E/M VISIT ADD ON: HCPCS | Performed by: INTERNAL MEDICINE

## 2025-04-15 PROCEDURE — 1159F MED LIST DOCD IN RCRD: CPT | Performed by: INTERNAL MEDICINE

## 2025-04-15 PROCEDURE — 3008F BODY MASS INDEX DOCD: CPT | Performed by: INTERNAL MEDICINE

## 2025-04-15 PROCEDURE — 1157F ADVNC CARE PLAN IN RCRD: CPT | Performed by: INTERNAL MEDICINE

## 2025-04-15 PROCEDURE — 1036F TOBACCO NON-USER: CPT | Performed by: INTERNAL MEDICINE

## 2025-04-15 PROCEDURE — 99214 OFFICE O/P EST MOD 30 MIN: CPT | Performed by: INTERNAL MEDICINE

## 2025-04-15 ASSESSMENT — ENCOUNTER SYMPTOMS
COUGH: 0
SHORTNESS OF BREATH: 0
ABDOMINAL PAIN: 0
FEVER: 0
CONSTIPATION: 1
HEMATEMESIS: 0
NAUSEA: 0
VOMITING: 0
HEMATOCHEZIA: 0
CHILLS: 0
DIARRHEA: 0
WEIGHT GAIN: 1
EXCESSIVE APPETITE: 0
BLOATING: 0
HEARTBURN: 1

## 2025-04-18 NOTE — TELEPHONE ENCOUNTER
Spoke with patient. He reports that he needed refills on finasteride. I informed him that that is not a medication that neurology manages. Patient did not attempt to get a sooner appointment with neurology. Patient informed that we will forward his request to his primary for follow up. Patient verbalized understanding.    The diagnoses is incorrect on mammogram order and medicare won't pay for it. She needs a new order sent with a diagnoses code that medicare will pay for.  And she needs it sent asap.

## 2025-05-01 NOTE — PROGRESS NOTES
AUDIOLOGIC EVALUATION  Name: Tara Brown  YOB: 1950  MRN: [id number]  Age: 74 y.o.    Date of Evaluation:  5/2/2025     History:  Tara Brown, 74 y.o., was seen today for a hearing evaluation prior to their appointment with Devonte Cárdenas MD. The patient reported concerns that the hearing in her left ear has decreased. She indicated that her hearing sounds like she is underwater. She has a history of tinnitus that can be made worse after loud noise exposure. She has a difficult time understanding others in background noise and at [employer]. She denied otalgia and dizziness.     Previous audiologic evaluation on 4/15/2024 revealed a mild sensorineural hearing loss rising to normal hearing at 1000 Hz sloping back to a mild sensorineural hearing loss in the right ear and a mild sloping to moderate from 500 - 1000 Hz rising back to a mild sensorineural hearing loss in the left ear. Word recognition abilities were measured to be excellent in the right ear and good in the left ear.    Evaluation:    Otoscopy  Clear canals bilaterally    Tympanometry  Right ear: Type A, normal ear canal volume and compliance.  Left ear: Type A, normal ear canal volume and compliance.     Acoustic Reflexes  Right ear: Ipsilateral acoustic reflexes present at 500 - 4000 Hz  Left ear: Could not maintain seal    Audiometric Evaluation  Right ear: mild sensorineural hearing loss rising to normal hearing at 1000 - 2000 Hz sloping back to a mild sensorineural hearing loss. Word recognition ability estimated to be excellent (100%) at 60 dB HL based on an NU-6 recorded ordered by difficulty 10-word list.   Left ear: mild sloping to moderately-severe from 500 - 1000 Hz rising to moderate sensorineural hearing loss. Word recognition ability estimated to be excellent(100%) at 80 dB HL based on an NU-6 recorded ordered by difficulty 10-word list.    Note: significant asymmetry of 15 - 50 dB HL noted from 500 - 2000 Hz, 4000 - 8000 Hz  (left poorer than right).    Binaural QuickSIN testing was completed at 70 dB HL. Two lists were averaged for best estimation of performance in noise. Results indicate an SNR loss of 3.5 dB. This degree of SNR loss is in the mild range. Performance today indicates that the patient may hear almost as well as normals in noise with use of directional microphone technology.    When compared to previous test results of 4/15/2024, thresholds are stable. Word recognition ability has improved in the left ear.    The test results were discussed with the patient.     Impressions  Today's evaluation revealed a mild sensorineural hearing loss rising to normal hearing at 1000 - 2000 Hz sloping back to a mild sensorineural hearing loss in the right ear. In the left ear, thresholds are consistent with a mild sloping to moderately-severe from 500 - 1000 Hz rising to moderate sensorineural hearing loss. Word recognition abilities were measured to be excellent in both ears. Tympanograms were type A (normal) in both ears.    Patient is interested in hearing aids. They were encouraged to contact their insurance provider to inquire about a possible hearing aid benefit and where it can be used. If they do not have a hearing aid benefit or wish to obtain hearing aids through our center, they were encouraged to schedule a hearing aid consult appointment at their earliest convenience.     The patient was given a copy of today's audiogram with these recommendations.     All patient questions were answered.     Recommendations  - Continue medical follow-up with established providers   - Continue medical follow-up with Dr. Cárdenas  - Consider binaural amplification    Time: 7161-1233    BECKY Pantoja, CCC-A  Licensed Audiologist

## 2025-05-02 ENCOUNTER — CLINICAL SUPPORT (OUTPATIENT)
Dept: AUDIOLOGY | Facility: CLINIC | Age: 75
End: 2025-05-02
Payer: MEDICARE

## 2025-05-02 DIAGNOSIS — H93.13 TINNITUS OF BOTH EARS: ICD-10-CM

## 2025-05-02 DIAGNOSIS — H90.3 ASYMMETRICAL SENSORINEURAL HEARING LOSS: Primary | ICD-10-CM

## 2025-05-02 PROCEDURE — 92550 TYMPANOMETRY & REFLEX THRESH: CPT | Mod: 52

## 2025-05-02 PROCEDURE — 92557 COMPREHENSIVE HEARING TEST: CPT

## 2025-05-02 ASSESSMENT — PAIN SCALES - GENERAL: PAINLEVEL_OUTOF10: 0 - NO PAIN

## 2025-05-02 ASSESSMENT — PAIN - FUNCTIONAL ASSESSMENT: PAIN_FUNCTIONAL_ASSESSMENT: 0-10

## 2025-05-02 NOTE — LETTER
May 2, 2025     Hilary Barillas, FRANKIE-CNP  [address], [address]  [facility] [address]    Patient: Tara Brown   YOB: 1950   Date of Visit: 5/2/2025       Dear Dr. Hilary Barillas, FRANKIE-CNP:    Thank you for referring Tara Brown to me for evaluation. Below are my notes for this consultation.  If you have questions, please do not hesitate to call me. I look forward to following your patient along with you.       Sincerely,     BECKY Pantoja, CCC-A      CC: No Recipients  ______________________________________________________________________________________    AUDIOLOGIC EVALUATION  Name: Tara Brown  YOB: 1950  MRN: [id number]  Age: 74 y.o.    Date of Evaluation:  5/2/2025     History:  Tara Brown, 74 y.o., was seen today for a hearing evaluation prior to their appointment with Devonte Cárdenas MD. The patient reported concerns that the hearing in her left ear has decreased. She indicated that her hearing sounds like she is underwater. She has a history of tinnitus that can be made worse after loud noise exposure. She has a difficult time understanding others in background noise and at [employer]. She denied otalgia and dizziness.     Previous audiologic evaluation on 4/15/2024 revealed a mild sensorineural hearing loss rising to normal hearing at 1000 Hz sloping back to a mild sensorineural hearing loss in the right ear and a mild sloping to moderate from 500 - 1000 Hz rising back to a mild sensorineural hearing loss in the left ear. Word recognition abilities were measured to be excellent in the right ear and good in the left ear.    Evaluation:    Otoscopy  Clear canals bilaterally    Tympanometry  Right ear: Type A, normal ear canal volume and compliance.  Left ear: Type A, normal ear canal volume and compliance.     Acoustic Reflexes  Right ear: Ipsilateral acoustic reflexes present at 500 - 4000 Hz  Left ear: Could not maintain seal    Audiometric  Evaluation  Right ear: mild sensorineural hearing loss rising to normal hearing at 1000 - 2000 Hz sloping back to a mild sensorineural hearing loss. Word recognition ability estimated to be excellent (100%) at 60 dB HL based on an NU-6 recorded ordered by difficulty 10-word list.   Left ear: mild sloping to moderately-severe from 500 - 1000 Hz rising to moderate sensorineural hearing loss. Word recognition ability estimated to be excellent(100%) at 80 dB HL based on an NU-6 recorded ordered by difficulty 10-word list.    Note: significant asymmetry of 15 - 50 dB HL noted from 500 - 2000 Hz, 4000 - 8000 Hz (left poorer than right).    Binaural QuickSIN testing was completed at 70 dB HL. Two lists were averaged for best estimation of performance in noise. Results indicate an SNR loss of 3.5 dB. This degree of SNR loss is in the mild range. Performance today indicates that the patient may hear almost as well as normals in noise with use of directional microphone technology.    When compared to previous test results of 4/15/2024, thresholds are stable. Word recognition ability has improved in the left ear.    The test results were discussed with the patient.     Impressions  Today's evaluation revealed a mild sensorineural hearing loss rising to normal hearing at 1000 - 2000 Hz sloping back to a mild sensorineural hearing loss in the right ear. In the left ear, thresholds are consistent with a mild sloping to moderately-severe from 500 - 1000 Hz rising to moderate sensorineural hearing loss. Word recognition abilities were measured to be excellent in both ears. Tympanograms were type A (normal) in both ears.    Patient is interested in hearing aids. They were encouraged to contact their insurance provider to inquire about a possible hearing aid benefit and where it can be used. If they do not have a hearing aid benefit or wish to obtain hearing aids through our center, they were encouraged to schedule a hearing aid  consult appointment at their earliest convenience.     The patient was given a copy of today's audiogram with these recommendations.     All patient questions were answered.     Recommendations  - Continue medical follow-up with established providers   - Continue medical follow-up with Dr. Cárdenas  - Consider binaural amplification    Time: 3541-4801    BECKY Pantoja, CCC-A  Licensed Audiologist

## 2025-05-04 NOTE — PROGRESS NOTES
History of present illness:  Tara Brown is a 74 y.o. female presenting today for follow up. She is considering getting hearing aids from [employer]. She has had an MRI in 2019.  She seems to struggle to hear conversations in noisy environment. Has been thinking about hearing amplification but needs some guidance.    RECALL 4/15/2024  Tara Brown is a 73 y.o. female presenting today for follow-up.  She denies any acute otological issues. But feels that hearing loss is affecting her day to day life and ability to communicate. Recently went vacationing with grand kids in [place].    Recall 12/22/2020  This is a follow-up visit for Mrs. Brown. Subjectively she feels that she may have slightly decreased hearing in the right ear. She also describes sensitivity to sounds when her  is whistling. She denies any significant otalgia otorrhea or vestibular complaints. Her trip overseas was uneventful from an ophthalmologic standpoint.     recall:  69 yo F with left asymmetrical SNHL, right middle ear effusion, and ETD who presents today for follow up. At last visit she was noted to have left maxillary sinusitis and was already on azithromycin. Medrol dosepak was added. She also obtained MRI and this was reviewed and showed right middle ear opacification but no evidence of masses/tumors. No new otologic symptoms since last visit     The patient's current medications, active allergies and list of medical problems were reviewed in the EHR and confirmed electronically there are as follows;    Allergies:   Allergies[1]  Current list of medications:   Current Medications[2]  Problem list:  Problem List[3]      Physical Examination:  CONSTITUTIONAL:  No acute distress  VOICE:  No hoarseness or other abnormality  RESPIRATION:  Breathing comfortably, no stridor  CV:  No clubbing/cyanosis/edema in hands  EYES:  EOM intact, sclera clear  NEURO:  Alert and oriented times 3, Cranial nerves II-XII grossly intact and  symmetric bilaterally  HEAD AND FACE:  Symmetric facial features, no masses or lesions  RIGHT EAR:  Normal external ear and post auricular area, no visible lesions, external auditory canal patent, tympanic membrane intact, no retraction, no signs of mass, effusion, or infection within the middle ear  LEFT EAR: Normal external ear and post auricular area, no visible lesions, external auditory canal patent, tympanic membrane intact, no retraction, no signs of mass, effusion, or infection within the middle ear  NOSE:  Anterior rhinoscopy clear, no significant external deformity.  ORAL CAVITY/OROPHARYNX/LIPS:  normal lining, mobile tongue.  PHARYNGEAL WALLS: Moist mucosal lining, good palatal elevation  NECK/LYMPH:  No LAD, no thyroid masses, trachea midline  SKIN:  Neck and facial skin is without scar or injury  PSYCH:  Alert and oriented with appropriate mood and affect    Diagnostic testing:    Audiometry:  Audiometry testing done on 5/2/2025 reveals:  Her hearing is stable. Slightly improved in word recognition in the left at 100%. On the right, there is mild to moderate sensorineural hearing loss. On the left, there is moderate to moderately severe sensorineural hearing loss. Excellent speech discrimination    I personally reviewed the available patient's external record and independently reviewed their audiometric testing [and] radiographic imaging through the appropriate viewing software as detailed in my note and agree with the detailed report.    Impression:  Asymmetric hearing loss     Recommendation:    Her hearing is stable and we reviewed her audiometric findings. We discussed hearing amplification options and also talked about the pros and cons for the hearing aids. She will make her decision and I will be happy to answer any questions for her.    I discussed with the patient the complexity of my medical decision making including the treatment and testing rational, indications of their elective procedure  and possible adverse effects and/or complications. Based on the provided documentation and my professional assessment of this patient's chronic stable condition, the complexity of evaluation and treatment is low.    This note was created using speech recognition transcription software. Despite proofreading, several typographical errors might be present that might affect the meaning of the content. Please call with any questions.    By signing my name below, TENNILLE RobertBernard Haywood attest that this documentation has been prepared under the direction and in the presence of Devonte Cárdenas MD          [1]   Allergies  Allergen Reactions    Adhesive Unknown    House Dust Mite Unknown    Penicillins Unknown    Sulfa (Sulfonamide Antibiotics) Unknown   [2]   Current Outpatient Medications   Medication Sig Dispense Refill    aspirin 325 mg tablet Take 1 tablet (325 mg) by mouth 2 times a day.      bisacodyl (Dulcolax) 5 mg EC tablet Take 1 tablet (5 mg) by mouth once daily as needed for constipation. Do not crush, chew, or split.      cetirizine (ZyrTEC) 10 mg chewable tablet Chew 1 tablet (10 mg) once daily.      dexlansoprazole (Dexilant) 60 mg DR capsule Take 1 capsule (60 mg) by mouth once daily. Do not crush or chew.      diazePAM (Valium) 5 mg tablet TAKE 1 TABLET ONE HOUR BEFORE APPOINTMENT, THEN TAKE ONE TABLET 20 MINUTES BEFORE PROCEDURE (Patient not taking: Reported on 4/15/2025) 2 tablet 0    docusate sodium (Colace) 100 mg capsule TAKE 1 CAPSULE BY MOUTH TWICE DAILY (Patient not taking: Reported on 4/15/2025) 60 capsule 0    escitalopram (Lexapro) 5 mg tablet Take 1 tablet (5 mg) by mouth once daily.      fluticasone (Flonase) 50 mcg/actuation nasal spray Administer 1 spray into each nostril once daily. Shake gently. Before first use, prime pump. After use, clean tip and replace cap.      levothyroxine (Synthroid, Levoxyl) 75 mcg tablet Take 1 tablet (75 mcg) by mouth once daily.      linaCLOtide (Linzess) 72  mcg capsule Take by mouth. (Patient not taking: Reported on 4/15/2025)      magnesium 30 mg tablet Take 1 tablet (30 mg) by mouth 2 times a day.      methylPREDNISolone (Medrol Dospak) 4 mg tablets Use as directed by package instructions (Patient not taking: Reported on 4/15/2024) 21 tablet 0    methylPREDNISolone (Medrol Dospak) 4 mg tablets Follow schedule on package instructions (Patient not taking: Reported on 4/15/2025) 1 each 0    montelukast (Singulair) 10 mg tablet Take 1 tablet (10 mg) by mouth once daily at bedtime.      simvastatin (Zocor) 20 mg tablet Take 1 tablet (20 mg) by mouth once daily at bedtime. (Patient not taking: Reported on 4/15/2025)       No current facility-administered medications for this visit.   [3]   Patient Active Problem List  Diagnosis    Acute maxillary sinusitis    Allergic rhinitis    Amoxicillin-induced allergic rash    Anxiety    Arthritis    Arthritis of right hip    Asthma    Constipation    Contusion, hand    Dysfunction of both eustachian tubes    Fluid level behind tympanic membrane of right ear    Fracture of 5th metatarsal    GERD (gastroesophageal reflux disease)    Hashimoto's disease    Heartburn    Hip pain, right    Hyperlipidemia    Hypothyroidism    Lumbago    Lumbar radiculitis    Lumbosacral spondylosis without myelopathy    Bilateral sensorineural hearing loss    Nasal polyps    Orthostatic lightheadedness    Palpitations    Prediabetes    Status post total replacement of right hip    Sulfasalazine adverse reaction, subsequent encounter    Trigger finger of right hand    Dysfunction of eustachian tube    History of metabolic disorder    Polyp of colon

## 2025-05-05 ENCOUNTER — APPOINTMENT (OUTPATIENT)
Facility: CLINIC | Age: 75
End: 2025-05-05
Payer: MEDICARE

## 2025-05-05 VITALS — WEIGHT: 185 LBS | HEIGHT: 69 IN | BODY MASS INDEX: 27.4 KG/M2

## 2025-05-05 DIAGNOSIS — H90.3 ASYMMETRICAL SENSORINEURAL HEARING LOSS: ICD-10-CM

## 2025-05-05 PROCEDURE — 1159F MED LIST DOCD IN RCRD: CPT | Performed by: OTOLARYNGOLOGY

## 2025-05-05 PROCEDURE — 1036F TOBACCO NON-USER: CPT | Performed by: OTOLARYNGOLOGY

## 2025-05-05 PROCEDURE — 1126F AMNT PAIN NOTED NONE PRSNT: CPT | Performed by: OTOLARYNGOLOGY

## 2025-05-05 PROCEDURE — 1157F ADVNC CARE PLAN IN RCRD: CPT | Performed by: OTOLARYNGOLOGY

## 2025-05-05 PROCEDURE — 3008F BODY MASS INDEX DOCD: CPT | Performed by: OTOLARYNGOLOGY

## 2025-05-05 PROCEDURE — 99213 OFFICE O/P EST LOW 20 MIN: CPT | Performed by: OTOLARYNGOLOGY

## 2025-05-05 ASSESSMENT — PAIN SCALES - GENERAL: PAINLEVEL_OUTOF10: 0-NO PAIN

## 2025-05-19 ENCOUNTER — APPOINTMENT (OUTPATIENT)
Facility: CLINIC | Age: 75
End: 2025-05-19
Payer: MEDICARE

## 2025-05-19 ENCOUNTER — APPOINTMENT (OUTPATIENT)
Dept: AUDIOLOGY | Facility: CLINIC | Age: 75
End: 2025-05-19
Payer: MEDICARE

## 2025-07-14 ENCOUNTER — OFFICE VISIT (OUTPATIENT)
Dept: FAMILY MEDICINE CLINIC | Age: 75
End: 2025-07-14
Payer: MEDICARE

## 2025-07-14 VITALS
DIASTOLIC BLOOD PRESSURE: 70 MMHG | TEMPERATURE: 97.8 F | HEART RATE: 76 BPM | SYSTOLIC BLOOD PRESSURE: 120 MMHG | WEIGHT: 183.6 LBS | HEIGHT: 69 IN | BODY MASS INDEX: 27.19 KG/M2

## 2025-07-14 DIAGNOSIS — M25.50 ARTHRALGIA, UNSPECIFIED JOINT: ICD-10-CM

## 2025-07-14 DIAGNOSIS — F32.A ANXIETY AND DEPRESSION: ICD-10-CM

## 2025-07-14 DIAGNOSIS — D64.9 ANEMIA, UNSPECIFIED TYPE: ICD-10-CM

## 2025-07-14 DIAGNOSIS — E78.5 HYPERLIPIDEMIA, UNSPECIFIED HYPERLIPIDEMIA TYPE: ICD-10-CM

## 2025-07-14 DIAGNOSIS — E03.9 HYPOTHYROIDISM, UNSPECIFIED TYPE: ICD-10-CM

## 2025-07-14 DIAGNOSIS — M62.830 BACK MUSCLE SPASM: ICD-10-CM

## 2025-07-14 DIAGNOSIS — R25.2 MUSCLE CRAMPS: ICD-10-CM

## 2025-07-14 DIAGNOSIS — F41.9 ANXIETY AND DEPRESSION: ICD-10-CM

## 2025-07-14 DIAGNOSIS — J30.9 ALLERGIC RHINITIS, UNSPECIFIED SEASONALITY, UNSPECIFIED TRIGGER: ICD-10-CM

## 2025-07-14 DIAGNOSIS — Z12.31 OTHER SCREENING MAMMOGRAM: ICD-10-CM

## 2025-07-14 DIAGNOSIS — R73.03 PREDIABETES: Primary | ICD-10-CM

## 2025-07-14 DIAGNOSIS — K21.9 GASTROESOPHAGEAL REFLUX DISEASE, UNSPECIFIED WHETHER ESOPHAGITIS PRESENT: ICD-10-CM

## 2025-07-14 PROCEDURE — G8417 CALC BMI ABV UP PARAM F/U: HCPCS | Performed by: NURSE PRACTITIONER

## 2025-07-14 PROCEDURE — 99214 OFFICE O/P EST MOD 30 MIN: CPT | Performed by: NURSE PRACTITIONER

## 2025-07-14 PROCEDURE — G8399 PT W/DXA RESULTS DOCUMENT: HCPCS | Performed by: NURSE PRACTITIONER

## 2025-07-14 PROCEDURE — 1159F MED LIST DOCD IN RCRD: CPT | Performed by: NURSE PRACTITIONER

## 2025-07-14 PROCEDURE — 1036F TOBACCO NON-USER: CPT | Performed by: NURSE PRACTITIONER

## 2025-07-14 PROCEDURE — 1090F PRES/ABSN URINE INCON ASSESS: CPT | Performed by: NURSE PRACTITIONER

## 2025-07-14 PROCEDURE — 3017F COLORECTAL CA SCREEN DOC REV: CPT | Performed by: NURSE PRACTITIONER

## 2025-07-14 PROCEDURE — 1125F AMNT PAIN NOTED PAIN PRSNT: CPT | Performed by: NURSE PRACTITIONER

## 2025-07-14 PROCEDURE — 1123F ACP DISCUSS/DSCN MKR DOCD: CPT | Performed by: NURSE PRACTITIONER

## 2025-07-14 PROCEDURE — G8427 DOCREV CUR MEDS BY ELIG CLIN: HCPCS | Performed by: NURSE PRACTITIONER

## 2025-07-14 RX ORDER — SIMVASTATIN 20 MG
TABLET ORAL
Qty: 90 TABLET | Refills: 1 | Status: SHIPPED | OUTPATIENT
Start: 2025-07-14

## 2025-07-14 RX ORDER — CYCLOBENZAPRINE HCL 5 MG
5 TABLET ORAL 3 TIMES DAILY PRN
Qty: 30 TABLET | Refills: 2 | Status: SHIPPED | OUTPATIENT
Start: 2025-07-14

## 2025-07-14 RX ORDER — DEXLANSOPRAZOLE 60 MG/1
60 CAPSULE, DELAYED RELEASE ORAL DAILY
Qty: 90 CAPSULE | Refills: 1 | Status: SHIPPED | OUTPATIENT
Start: 2025-07-14

## 2025-07-14 RX ORDER — MONTELUKAST SODIUM 10 MG/1
TABLET ORAL
Qty: 90 TABLET | Refills: 1 | Status: SHIPPED | OUTPATIENT
Start: 2025-07-14

## 2025-07-14 RX ORDER — METHYLPREDNISOLONE 4 MG/1
TABLET ORAL
Qty: 21 TABLET | Refills: 0 | Status: SHIPPED | OUTPATIENT
Start: 2025-07-14 | End: 2025-07-20

## 2025-07-14 RX ORDER — ESCITALOPRAM OXALATE 5 MG/1
TABLET ORAL
Qty: 90 TABLET | Refills: 1 | Status: SHIPPED | OUTPATIENT
Start: 2025-07-14

## 2025-07-14 RX ORDER — MAGNESIUM OXIDE 400 MG/1
400 TABLET ORAL DAILY
Qty: 90 TABLET | Refills: 1 | Status: SHIPPED | OUTPATIENT
Start: 2025-07-14

## 2025-07-14 RX ORDER — LEVOTHYROXINE SODIUM 75 UG/1
TABLET ORAL
Qty: 90 TABLET | Refills: 1 | Status: SHIPPED | OUTPATIENT
Start: 2025-07-14

## 2025-07-14 ASSESSMENT — ENCOUNTER SYMPTOMS
SHORTNESS OF BREATH: 0
COUGH: 0
BACK PAIN: 0

## 2025-07-14 NOTE — PROGRESS NOTES
Subjective  Chief Complaint   Patient presents with    6 Month Follow-Up     Does have concerns    Hyperlipidemia     No Concerns    Medication Refill     Refills, Pended    Discuss Medications     Would like to discuss Flexeril     Muscle Tightness     Bilateral leg tightness, muscles get real tight in a particular area, only 3 episodes, 6/10, 6/14, 7/7    Knee Pain     RT knee pain, comes and goes, tender, x 3 months       HPI    History of Present Illness  The patient is a 74-year-old female who presents for evaluation of muscle cramps, knee pain, and hearing loss.    She received a cortisone injection in her L4-L5-S1 on 06/12/2025, which significantly improved her back condition. This allowed her to engage in strenuous activities such as hiking in [facility], where she experienced a 1000-foot elevation gain without any major back issues. However, she did experience some soreness in her piriformis and hips.     She has been experiencing severe muscle tightness in her hamstring and calf, leading to leg weakness. These symptoms typically occur after swimming or exercising. A nurse practitioner at the pain management center suggested that electrolyte imbalance might be the cause. She has been consuming a pink drink from [employer], but not on a daily basis. She has been taking Flexeril more frequently, sometimes daily instead of twice a week, and is requesting a refill. She prefers the 5 mg dose as higher doses cause excessive drowsiness. She used her prednisone pack while in [place] and would like to have it available for emergencies during travel. She plans to travel again around [place].    She also reports low energy levels and lack of motivation, feeling overwhelmed by the fast pace of life and summer activities.    She has been experiencing knee pain, particularly in the right knee. She sought chiropractic care, where it was noted that her meniscus was out of place. The chiropractor manipulated it back into place,

## 2025-07-15 ENCOUNTER — APPOINTMENT (OUTPATIENT)
Dept: GASTROENTEROLOGY | Facility: CLINIC | Age: 75
End: 2025-07-15
Payer: MEDICARE

## 2025-07-16 DIAGNOSIS — E78.5 HYPERLIPIDEMIA, UNSPECIFIED HYPERLIPIDEMIA TYPE: ICD-10-CM

## 2025-07-16 DIAGNOSIS — R73.03 PREDIABETES: ICD-10-CM

## 2025-07-16 DIAGNOSIS — E03.9 HYPOTHYROIDISM, UNSPECIFIED TYPE: ICD-10-CM

## 2025-07-16 DIAGNOSIS — R25.2 MUSCLE CRAMPS: ICD-10-CM

## 2025-07-16 DIAGNOSIS — D64.9 ANEMIA, UNSPECIFIED TYPE: ICD-10-CM

## 2025-07-16 LAB
ALBUMIN SERPL-MCNC: 4.4 G/DL (ref 3.5–4.6)
ALP SERPL-CCNC: 61 U/L (ref 40–130)
ALT SERPL-CCNC: 14 U/L (ref 0–33)
ANION GAP SERPL CALCULATED.3IONS-SCNC: 15 MEQ/L (ref 9–15)
AST SERPL-CCNC: 25 U/L (ref 0–35)
BILIRUB SERPL-MCNC: 0.3 MG/DL (ref 0.2–0.7)
BUN SERPL-MCNC: 15 MG/DL (ref 8–23)
CALCIUM SERPL-MCNC: 9.3 MG/DL (ref 8.5–9.9)
CHLORIDE SERPL-SCNC: 103 MEQ/L (ref 95–107)
CHOLEST SERPL-MCNC: 174 MG/DL (ref 0–199)
CO2 SERPL-SCNC: 24 MEQ/L (ref 20–31)
CREAT SERPL-MCNC: 0.79 MG/DL (ref 0.5–0.9)
ERYTHROCYTE [DISTWIDTH] IN BLOOD BY AUTOMATED COUNT: 12.2 % (ref 11.5–14.5)
GLOBULIN SER CALC-MCNC: 2.6 G/DL (ref 2.3–3.5)
GLUCOSE SERPL-MCNC: 77 MG/DL (ref 70–99)
HCT VFR BLD AUTO: 41.5 % (ref 37–47)
HDLC SERPL-MCNC: 60 MG/DL (ref 40–59)
HGB BLD-MCNC: 13.4 G/DL (ref 12–16)
LDLC SERPL CALC-MCNC: 94 MG/DL (ref 0–129)
MAGNESIUM SERPL-MCNC: 2.3 MG/DL (ref 1.7–2.4)
MCH RBC QN AUTO: 30.8 PG (ref 27–31.3)
MCHC RBC AUTO-ENTMCNC: 32.3 % (ref 33–37)
MCV RBC AUTO: 95.4 FL (ref 79.4–94.8)
PLATELET # BLD AUTO: 337 K/UL (ref 130–400)
POTASSIUM SERPL-SCNC: 5 MEQ/L (ref 3.4–4.9)
PROT SERPL-MCNC: 7 G/DL (ref 6.3–8)
RBC # BLD AUTO: 4.35 M/UL (ref 4.2–5.4)
SODIUM SERPL-SCNC: 142 MEQ/L (ref 135–144)
TRIGL SERPL-MCNC: 101 MG/DL (ref 0–150)
TSH REFLEX: 1.62 UIU/ML (ref 0.44–3.86)
WBC # BLD AUTO: 4.5 K/UL (ref 4.8–10.8)

## 2025-07-17 LAB
ESTIMATED AVERAGE GLUCOSE: 120 MG/DL
FOLATE: 17 NG/ML (ref 4.8–24.2)
HBA1C MFR BLD: 5.8 % (ref 4–6)
VITAMIN B-12: 868 PG/ML (ref 232–1245)

## 2025-07-19 DIAGNOSIS — J30.9 ALLERGIC RHINITIS, UNSPECIFIED SEASONALITY, UNSPECIFIED TRIGGER: ICD-10-CM

## 2025-07-19 NOTE — TELEPHONE ENCOUNTER
Comments:     Last Office Visit (last PCP visit):   7/14/2025    Next Visit Date:  Future Appointments   Date Time Provider Department Center   8/22/2025  8:40 AM [facility]   1/14/2026 10:30 AM Mague Lopez, APRN - CNP [facility] DEP       **If hasn't been seen in over a year OR hasn't followed up according to last diabetes/ADHD visit, make appointment for patient before sending refill to provider.    Rx requested:  Requested Prescriptions     Pending Prescriptions Disp Refills    fluticasone (FLONASE) 50 MCG/ACT nasal spray [Pharmacy Med Name: fluticasone propionate 50 mcg/actuation nasal spray,suspension] 48 g 1     Sig: Use 2 sprays by Nasal route daily

## 2025-07-20 ENCOUNTER — RESULTS FOLLOW-UP (OUTPATIENT)
Dept: FAMILY MEDICINE CLINIC | Age: 75
End: 2025-07-20

## 2025-07-20 RX ORDER — FLUTICASONE PROPIONATE 50 MCG
SPRAY, SUSPENSION (ML) NASAL
Qty: 48 G | Refills: 1 | Status: SHIPPED | OUTPATIENT
Start: 2025-07-20

## 2025-07-21 NOTE — PROGRESS NOTES
HEARING AID CONSULTATION    Name: Tara Brown  Age: 74 y.o.  MRN: [id number]  Date of Appointment:  7/22/2025     History:  Tara Brown, 74 y.o. years old, was seen for a hearing aid consultation. She is accompanied by her  to today's appointment.     Recall: The patient reported concerns that the hearing in her left ear has decreased. She indicated that her hearing sounds like she is underwater. She has a history of tinnitus that can be made worse after loud noise exposure. She has a difficult time understanding others in background noise and at [employer]. She denied otalgia and dizziness.     Previous hearing evaluation on 5/2/2025 revealed a mild sensorineural hearing loss rising to normal hearing at 1000 - 2000 Hz sloping back to a mild sensorineural hearing loss in the right ear. In the left ear, thresholds are consistent with a mild sloping to moderately-severe from 500 - 1000 Hz rising to moderate sensorineural hearing loss. Word recognition abilities were measured to be excellent in both ears.      Hearing Aid Consultation  Hearing aid technologies, styles, and communication needs were briefly discussed. Particular time and attention were paid to how her hearing aids can be adjusted if her hearing were to change. It was ultimately decided that she would pursue (2) Phonak Audeo L90-RT hearing aids in color P3. She paid in full today (including consultation fee) and no payment will need to be collected at the fitting appointment.    Recommendations  1) Return on 8/7/2025 as scheduled for hearing aid fitting  2) Re-test hearing annually      Time: 5033-1518    BECKY Pantoja, CCC-A  Licensed Audiologist

## 2025-07-22 ENCOUNTER — CLINICAL SUPPORT (OUTPATIENT)
Dept: AUDIOLOGY | Facility: CLINIC | Age: 75
End: 2025-07-22

## 2025-07-22 DIAGNOSIS — H90.3 ASYMMETRICAL SENSORINEURAL HEARING LOSS: Primary | ICD-10-CM

## 2025-07-22 PROCEDURE — V5160 DISPENSING FEE BINAURAL: HCPCS | Mod: AUDSP

## 2025-07-22 PROCEDURE — 92700 UNLISTED ORL SERVICE/PX: CPT | Mod: AUDSP

## 2025-07-22 PROCEDURE — V5261 HEARING AID, DIGIT, BIN, BTE: HCPCS | Mod: AUDSP

## 2025-08-05 ENCOUNTER — OFFICE VISIT (OUTPATIENT)
Dept: FAMILY MEDICINE CLINIC | Age: 75
End: 2025-08-05

## 2025-08-05 VITALS
HEART RATE: 75 BPM | OXYGEN SATURATION: 95 % | DIASTOLIC BLOOD PRESSURE: 76 MMHG | HEIGHT: 69 IN | WEIGHT: 182 LBS | TEMPERATURE: 97.8 F | SYSTOLIC BLOOD PRESSURE: 124 MMHG | BODY MASS INDEX: 26.96 KG/M2

## 2025-08-05 DIAGNOSIS — S81.801A WOUND OF RIGHT LOWER EXTREMITY, INITIAL ENCOUNTER: Primary | ICD-10-CM

## 2025-08-05 RX ORDER — MUPIROCIN 2 %
OINTMENT (GRAM) TOPICAL
Qty: 22 G | Refills: 0 | Status: SHIPPED | OUTPATIENT
Start: 2025-08-05

## 2025-08-05 ASSESSMENT — ENCOUNTER SYMPTOMS
SHORTNESS OF BREATH: 0
CHEST TIGHTNESS: 0
COLOR CHANGE: 0

## 2025-08-06 NOTE — PROGRESS NOTES
HEARING AID FITTING    Name: Tara Brown  Age: 74 y.o.  MRN: [id number]  Date of Appointment:  8/7/2025     History:  Tara Brown, 74 y.o. years old, was seen for a hearing aid fitting.     Patient paid in full for their hearing aids on 7/22/2025    Recall: The patient reported concerns that the hearing in her left ear has decreased. She indicated that her hearing sounds like she is underwater. She has a history of tinnitus that can be made worse after loud noise exposure. She has a difficult time understanding others in background noise and at [employer]. She denied otalgia and dizziness.     Previous hearing evaluation on 5/2/2025 revealed a mild sensorineural hearing loss rising to normal hearing at 1000 - 2000 Hz sloping back to a mild sensorineural hearing loss in the right ear. In the left ear, thresholds are consistent with a mild sloping to moderately-severe from 500 - 1000 Hz rising to moderate sensorineural hearing loss. Word recognition abilities were measured to be excellent in both ears.    Hearing Aid Information  Right: Phonak Audeo L90-RT  [id number]  2M , small closed dome   Left: Phonak Audeo L90-RT  [id number]  2M , small closed dome   Warranty expiration date: 8/22/2028  Fit Date: 8/7/2025    Hearing Aid Fitting  Prior to today's appointment, testbox speech mapping was completed to ensure hearing aids meet target at soft, medium, and loud inputs. MPO was verified. Programming was completed at 100% of Phonak prescriptive fitting strategy. Program and volume control signals were demonstrated for the patient. Care and maintenance of the device were discussed with the patient. The patient was able to properly insert and remove the hearing instrument from the ear. In addition to today's verbal instruction of the hearing devices, the patient was given written instructions from the hearing aid . Hearing aid limitations were discussed at length as well as realistic  expectations. The patient was advised in order to receive full benefit of amplification, consistent use during all waking hours is recommended.     The repair warranty and the conditions of the right-to-return period (30-days) were discussed. The patient was informed that the hearing aid warranty is provided by the hearing aid , and not [facility].  audiologists are available to facilitate device and accessory repairs under warranty.  However, it is the hearing aid  that is responsible for all costs related to repairs under warranty.  Audiology will charge a fee to the patient for professional services to trouble shoot hearing aid device issues, to return the device to the manufacture for repair, to reprogram the device if needed, and to re-dispense the device to the patient. The fee will vary based on the professional services provided, and can range from $30 - $200. Charges for the replacement of a lost hearing aid under warrantee will be higher.The patient reports understanding of these conditions. Purchase agreement was signed (see scanned documents).    Her hearing aids were paired to her phone and the [employer] tato. Tato functionality was discussed.     Impressions  The patient was fit with her hearing aids today. She reported satisfaction with the sound quality and loudness of the hearing aids.     Recommendations  1) Continue use of binaural amplification  2) Return as scheduled for a one month check  3) Re-test hearing annually      Time: 9595-5361    BECKY Pantoja, CCC-A  Licensed Audiologist

## 2025-08-07 ENCOUNTER — CLINICAL SUPPORT (OUTPATIENT)
Dept: AUDIOLOGY | Facility: CLINIC | Age: 75
End: 2025-08-07
Payer: MEDICARE

## 2025-08-07 DIAGNOSIS — H90.3 ASYMMETRICAL SENSORINEURAL HEARING LOSS: Primary | ICD-10-CM

## 2025-08-07 ASSESSMENT — PAIN SCALES - GENERAL: PAINLEVEL_OUTOF10: 0 - NO PAIN

## 2025-08-07 ASSESSMENT — PAIN - FUNCTIONAL ASSESSMENT: PAIN_FUNCTIONAL_ASSESSMENT: 0-10

## 2025-08-21 ENCOUNTER — CLINICAL SUPPORT (OUTPATIENT)
Dept: AUDIOLOGY | Facility: CLINIC | Age: 75
End: 2025-08-21

## 2025-08-21 DIAGNOSIS — H90.3 ASYMMETRICAL SENSORINEURAL HEARING LOSS: Primary | ICD-10-CM

## 2025-08-21 PROCEDURE — HRANC PR HEARING AID NO CHARGE

## 2025-08-22 ENCOUNTER — HOSPITAL ENCOUNTER (OUTPATIENT)
Dept: WOMENS IMAGING | Age: 75
Discharge: HOME | End: 2025-08-24
Payer: MEDICARE

## 2025-08-22 DIAGNOSIS — Z12.31 OTHER SCREENING MAMMOGRAM: ICD-10-CM

## 2025-08-22 PROCEDURE — 77063 BREAST TOMOSYNTHESIS BI: CPT

## 2025-08-28 ENCOUNTER — PATIENT MESSAGE (OUTPATIENT)
Dept: FAMILY MEDICINE CLINIC | Age: 75
End: 2025-08-28

## 2025-08-28 DIAGNOSIS — M62.830 BACK MUSCLE SPASM: ICD-10-CM

## 2025-08-28 RX ORDER — CYCLOBENZAPRINE HCL 5 MG
5 TABLET ORAL 3 TIMES DAILY PRN
Qty: 180 TABLET | Refills: 0 | Status: SHIPPED | OUTPATIENT
Start: 2025-08-28